# Patient Record
Sex: MALE | Race: WHITE | NOT HISPANIC OR LATINO | Employment: OTHER | URBAN - METROPOLITAN AREA
[De-identification: names, ages, dates, MRNs, and addresses within clinical notes are randomized per-mention and may not be internally consistent; named-entity substitution may affect disease eponyms.]

---

## 2017-01-05 RX ORDER — ALLOPURINOL 100 MG/1
100 TABLET ORAL DAILY
Qty: 30 TABLET | Refills: 5
Start: 2017-01-05 | End: 2017-10-11

## 2017-01-05 NOTE — TELEPHONE ENCOUNTER
Last office visit 08/03/2016. Last refill date 08/05/2016. Pt is requesting a refill on allopurinol 100mg #90 qd

## 2017-01-05 NOTE — TELEPHONE ENCOUNTER
I refilled this patient's zyloprim in error.  Please call the pharmacy and cancel it.  This is not my patient.

## 2017-01-31 ENCOUNTER — TELEPHONE (OUTPATIENT)
Dept: FAMILY MEDICINE | Facility: CLINIC | Age: 62
End: 2017-01-31

## 2017-02-01 RX ORDER — ALLOPURINOL 100 MG/1
100 TABLET ORAL DAILY
Qty: 30 TABLET | Refills: 5
Start: 2017-02-01

## 2017-02-02 ENCOUNTER — NURSE TRIAGE (OUTPATIENT)
Dept: ADMINISTRATIVE | Facility: CLINIC | Age: 62
End: 2017-02-02

## 2017-02-02 NOTE — TELEPHONE ENCOUNTER
Pt called saying his request for a refill was not denied. Upon review of chart, medication ordered (allopurinol) with refills on 1/5.  Recommended pt contact pharmacy to determine status and if needed to call back.    Reason for Disposition   Information only question and nurse able to answer    Protocols used: ST NO PROTOCOL CALL: INFORMATION ONLY-A-[place]

## 2017-02-02 NOTE — TELEPHONE ENCOUNTER
Pt calling back reporting that the pharmacy does not have the RX.  I spoke with the pharmacy and confirmed the order with them via telephone.    Upon researching further, it appears as though the RX was pended.    Attempted to call provider office with no success.    Provider staff -  patient directly regarding this medication.     Reason for Disposition   Caller requesting a NON-URGENT new prescription or refill and triager unable to refill per unit policy    Protocols used: ST MEDICATION QUESTION CALL-A-      Dr. Thompson / Dr. Garibay - Can either of you refill RX for allopurinol?  There appears to have been an issue with previous RX.

## 2017-02-03 ENCOUNTER — PATIENT MESSAGE (OUTPATIENT)
Dept: ORTHOPEDICS | Facility: CLINIC | Age: 62
End: 2017-02-03

## 2017-03-22 ENCOUNTER — TELEPHONE (OUTPATIENT)
Dept: UROLOGY | Facility: CLINIC | Age: 62
End: 2017-03-22

## 2017-03-22 NOTE — TELEPHONE ENCOUNTER
----- Message from Cristine Strong sent at 3/22/2017  4:35 PM CDT -----  Contact: self   Patient wants to speak with a nurse regarding scheduling appointment this week or early next week for a chase psa please call back [phone]

## 2017-03-22 NOTE — TELEPHONE ENCOUNTER
Spoke to pt and booked him an apt dali hopkins at 2:30pm to see dr matthews. PT verbalized understanding, he will bring a copy of his recent psa.

## 2017-03-24 ENCOUNTER — OFFICE VISIT (OUTPATIENT)
Dept: UROLOGY | Facility: CLINIC | Age: 62
End: 2017-03-24
Payer: COMMERCIAL

## 2017-03-24 VITALS
HEIGHT: 70 IN | WEIGHT: 206.13 LBS | BODY MASS INDEX: 29.51 KG/M2 | HEART RATE: 89 BPM | DIASTOLIC BLOOD PRESSURE: 76 MMHG | SYSTOLIC BLOOD PRESSURE: 129 MMHG

## 2017-03-24 DIAGNOSIS — R97.20 ELEVATED PSA: Primary | ICD-10-CM

## 2017-03-24 DIAGNOSIS — N20.0 KIDNEY STONE: ICD-10-CM

## 2017-03-24 DIAGNOSIS — N28.1 RENAL CYST: ICD-10-CM

## 2017-03-24 DIAGNOSIS — N40.1 BENIGN NON-NODULAR PROSTATIC HYPERPLASIA WITH LOWER URINARY TRACT SYMPTOMS: ICD-10-CM

## 2017-03-24 LAB
BILIRUB SERPL-MCNC: NORMAL MG/DL
BLOOD URINE, POC: NORMAL
COLOR, POC UA: YELLOW
GLUCOSE UR QL STRIP: NORMAL
KETONES UR QL STRIP: NORMAL
LEUKOCYTE ESTERASE URINE, POC: NORMAL
NITRITE, POC UA: NORMAL
PH, POC UA: 5
PROTEIN, POC: NORMAL
SPECIFIC GRAVITY, POC UA: 1.02
UROBILINOGEN, POC UA: NORMAL

## 2017-03-24 PROCEDURE — 99999 PR PBB SHADOW E&M-EST. PATIENT-LVL III: CPT | Mod: PBBFAC,,, | Performed by: UROLOGY

## 2017-03-24 PROCEDURE — 1160F RVW MEDS BY RX/DR IN RCRD: CPT | Mod: S$GLB,,, | Performed by: UROLOGY

## 2017-03-24 PROCEDURE — 99214 OFFICE O/P EST MOD 30 MIN: CPT | Mod: 25,S$GLB,, | Performed by: UROLOGY

## 2017-03-24 PROCEDURE — 81002 URINALYSIS NONAUTO W/O SCOPE: CPT | Mod: S$GLB,,, | Performed by: UROLOGY

## 2017-03-24 RX ORDER — CIPROFLOXACIN 500 MG/1
500 TABLET ORAL 2 TIMES DAILY
Qty: 6 TABLET | Refills: 0 | Status: SHIPPED | OUTPATIENT
Start: 2017-04-09 | End: 2017-04-12

## 2017-03-24 RX ORDER — METOPROLOL SUCCINATE 25 MG/1
25 TABLET, EXTENDED RELEASE ORAL 2 TIMES DAILY
COMMUNITY
End: 2017-10-11

## 2017-03-24 RX ORDER — LOSARTAN POTASSIUM 50 MG/1
50 TABLET ORAL DAILY
COMMUNITY
End: 2020-06-17

## 2017-03-24 NOTE — MR AVS SNAPSHOT
[place] [facility]  [address]  [facility] [address]  Phone: [phone]                  Merrill Cummings   3/24/2017 2:30 PM   Office Visit    Description:  Male : 1955   Provider:  iDlan Edmonds MD   Department:  [place] [facility]           Reason for Visit     Elevated PSA                To Do List           Goals (5 Years of Data)     None      [facility]     [facility] -  Assistance  Registered nurses in the [facility] provide clinical advisement, health education, appointment booking, and other advisory services.  Call for this free service at [phone].             Medications           Message regarding Medications     Verify the changes and/or additions to your medication regime listed below are the same as discussed with your clinician today.  If any of these changes or additions are incorrect, please notify your healthcare provider.        STOP taking these medications     indomethacin (INDOCIN SR) 75 mg CpSR CR capsule Take 1 capsule (75 mg total) by mouth 2 (two) times daily.    BYSTOLIC 10 mg Tab 5 mg 2 (two) times daily.            Verify that the below list of medications is an accurate representation of the medications you are currently taking.  If none reported, the list may be blank. If incorrect, please contact your healthcare provider. Carry this list with you in case of emergency.           Current Medications     aspirin 81 MG chewable tablet Take 81 mg by mouth once daily.      co-enzyme Q-10 30 mg capsule Take 30 mg by mouth 3 (three) times daily.    hydrochlorothiazide (MICROZIDE) 12.5 mg capsule Take 12.5 mg by mouth once daily.     losartan (COZAAR) 100 MG tablet Take 100 mg by mouth once daily.    metformin (GLUCOPHAGE) 500 MG tablet Take by mouth 3 (three) times daily after meals. Take 2 tablets in the morning and one tablet in the evening    metoprolol succinate (TOPROL-XL) 25 MG 24 hr tablet Take 25 mg by  "mouth 2 (two) times daily.    multivitamin with minerals tablet Take 1 tablet by mouth once daily.    ONETOUCH DELICA LANCETS 30 gauge Misc     simvastatin (ZOCOR) 40 MG tablet Take 1 tablet by mouth every other day.     allopurinol (ZYLOPRIM) 100 MG tablet Take 1 tablet (100 mg total) by mouth once daily.    levocetirizine (XYZAL) 5 MG tablet Take 1 tablet (5 mg total) by mouth every evening.           Clinical Reference Information           Your Vitals Were     BP Pulse Height Weight BMI    129/76 (BP Location: Left arm) 89 5' 10" (1.778 m) 93.5 kg (206 lb 2.1 oz) 29.58 kg/m2      Blood Pressure          Most Recent Value    BP  129/76      Allergies as of 3/24/2017     Nexium [Esomeprazole Magnesium]      Immunizations Administered on Date of Encounter - 3/24/2017     None      Language Assistance Services     ATTENTION: Language assistance services are available, free of charge. Please call [phone].      ATENCIÓN: Si habla jojo, tiene a turner disposición servicios gratuitos de asistencia lingüística. Llame al [phone].     ELLIOT Ý: N?u b?n nói Ti?ng Vi?t, có các d?ch v? h? tr? ngôn ng? mi?n phí dành cho b?n. G?i s? [phone].         [facility] complies with applicable Federal civil rights laws and does not discriminate on the basis of race, color, national origin, age, disability, or sex.        "

## 2017-03-24 NOTE — PROGRESS NOTES
UROLOGY [place]  3 14 17    c-c elevated psa    Age 62, comes in because his psa came back elevated at 7.6 in a [facility] test done 3 20 17.  At the same time, creat was 0.73, hct 38.2, electrolytes normal, and cholesterol normal.  In our lab records, psa was 3.3 two years ago    Pt voids well. Has nocturia x 0-1. Stream is fine, no burning or pains.   No frequency or urgency.    Pt has a hx of renal cyst and kidney stones       PAST MEDICAL HISTORY:  SURGICAL: Ankle fracture surgery, extracorporeal shock wave lithotripsy x2,   2002 and 2004.  Circumcision.     MEDICAL: Diabetes, arterial hypertension, hyperlipidemia, urolithiasis,   muscular dystrophy diagnosed in 2012 by the Neurology Service at [facility]   in the main [place]. The patient was leg braces to improve his walking.     FAMILIAL MEDICAL HISTORY: Father had prostate cancer, but did not die of that.   He also had lung cancer and did die of that.     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    Current Outpatient Prescriptions on File Prior to Visit   Medication Sig Dispense Refill    aspirin 81 MG chewable tablet Take 81 mg by mouth once daily.        co-enzyme Q-10 30 mg capsule Take 30 mg by mouth 3 (three) times daily.      hydrochlorothiazide (MICROZIDE) 12.5 mg capsule Take 12.5 mg by mouth once daily.       metformin (GLUCOPHAGE) 500 MG tablet Take by mouth 3 (three) times daily after meals. Take 2 tablets in the morning and one tablet in the evening      ONETOUCH DELICA LANCETS 30 gauge Misc       simvastatin (ZOCOR) 40 MG tablet Take 1 tablet by mouth every other day.       allopurinol (ZYLOPRIM) 100 MG tablet Take 1 tablet (100 mg total) by mouth once daily. 30 tablet 5    levocetirizine (XYZAL) 5 MG tablet Take 1 tablet (5 mg total) by mouth every evening. 10 tablet 0   ROS:  Denies malaise, headaches, eye symptoms, difficulty swallowing or breathing  problems.   No chest pains or palpitations.   No change in bowel habits, no tarry stools or hematochezia. No acid reflux.  No genital lesions.  No bleeding disorders, no seizures.       ALLERGIES: NEXIUM.      PHYSICAL EXAMINATION:  Pt alert, oriented, cooperative, no distress  HEENT: wnl.  Neck: supple, no JVD, no lymphadenopathy  Chest: CV NSR, no murmurs  Lungs: normal auscultation  ABDOMEN: Flat and nontender. No masses. No surgical scars. No hernias detected.  GENITOURINARY: Penis circumcised. Meatus normal. Testes normal.  JULY: Shows 40 g prostate without indurations or nodules.  Extremities: no edema, peripheral pulses nl  Neuro: preserved       IMPRESSION:   Elevated psa  bph on observation  Renal cyst  Kidney stones    I recommend prostate biopsy

## 2017-04-07 ENCOUNTER — ANESTHESIA EVENT (OUTPATIENT)
Dept: SURGERY | Facility: HOSPITAL | Age: 62
End: 2017-04-07
Payer: COMMERCIAL

## 2017-04-10 ENCOUNTER — HOSPITAL ENCOUNTER (OUTPATIENT)
Facility: HOSPITAL | Age: 62
Discharge: HOME | End: 2017-04-10
Attending: UROLOGY | Admitting: UROLOGY
Payer: COMMERCIAL

## 2017-04-10 ENCOUNTER — ANESTHESIA (OUTPATIENT)
Dept: SURGERY | Facility: HOSPITAL | Age: 62
End: 2017-04-10
Payer: COMMERCIAL

## 2017-04-10 ENCOUNTER — SURGERY (OUTPATIENT)
Age: 62
End: 2017-04-10

## 2017-04-10 DIAGNOSIS — R97.20 ELEVATED PSA: ICD-10-CM

## 2017-04-10 LAB — GLUCOSE SERPL-MCNC: 129 MG/DL (ref 70–110)

## 2017-04-10 PROCEDURE — D9220A PRA ANESTHESIA: Mod: CRNA,,, | Performed by: NURSE ANESTHETIST, CERTIFIED REGISTERED

## 2017-04-10 PROCEDURE — D9220A PRA ANESTHESIA: Mod: ANES,,, | Performed by: ANESTHESIOLOGY

## 2017-04-10 PROCEDURE — 71000033 HC RECOVERY, INTIAL HOUR: Mod: PO | Performed by: UROLOGY

## 2017-04-10 PROCEDURE — 25000003 PHARM REV CODE 250: Mod: PO | Performed by: NURSE ANESTHETIST, CERTIFIED REGISTERED

## 2017-04-10 PROCEDURE — 76942 ECHO GUIDE FOR BIOPSY: CPT | Mod: 26,59,, | Performed by: UROLOGY

## 2017-04-10 PROCEDURE — 76872 US TRANSRECTAL: CPT | Mod: 26,,, | Performed by: UROLOGY

## 2017-04-10 PROCEDURE — 63600175 PHARM REV CODE 636 W HCPCS: Mod: PO | Performed by: NURSE ANESTHETIST, CERTIFIED REGISTERED

## 2017-04-10 PROCEDURE — 37000008 HC ANESTHESIA 1ST 15 MINUTES: Mod: PO | Performed by: UROLOGY

## 2017-04-10 PROCEDURE — 25000003 PHARM REV CODE 250: Mod: PO | Performed by: UROLOGY

## 2017-04-10 PROCEDURE — 88342 IMHCHEM/IMCYTCHM 1ST ANTB: CPT | Mod: 26,,, | Performed by: PATHOLOGY

## 2017-04-10 PROCEDURE — 82962 GLUCOSE BLOOD TEST: CPT | Mod: PO | Performed by: UROLOGY

## 2017-04-10 PROCEDURE — 55700 PR BIOPSY OF PROSTATE,NEEDLE/PUNCH: CPT | Mod: ,,, | Performed by: UROLOGY

## 2017-04-10 PROCEDURE — 25000003 PHARM REV CODE 250: Mod: PO | Performed by: ANESTHESIOLOGY

## 2017-04-10 PROCEDURE — 88305 TISSUE EXAM BY PATHOLOGIST: CPT | Mod: 26,,, | Performed by: PATHOLOGY

## 2017-04-10 PROCEDURE — 88305 TISSUE EXAM BY PATHOLOGIST: CPT | Performed by: PATHOLOGY

## 2017-04-10 PROCEDURE — 36000704 HC OR TIME LEV I 1ST 15 MIN: Mod: PO | Performed by: UROLOGY

## 2017-04-10 PROCEDURE — 71000039 HC RECOVERY, EACH ADD'L HOUR: Mod: PO | Performed by: UROLOGY

## 2017-04-10 PROCEDURE — 36000705 HC OR TIME LEV I EA ADD 15 MIN: Mod: PO | Performed by: UROLOGY

## 2017-04-10 PROCEDURE — 37000009 HC ANESTHESIA EA ADD 15 MINS: Mod: PO | Performed by: UROLOGY

## 2017-04-10 RX ORDER — SODIUM CHLORIDE 0.9 % (FLUSH) 0.9 %
3 SYRINGE (ML) INJECTION
Status: DISCONTINUED | OUTPATIENT
Start: 2017-04-10 | End: 2017-04-10 | Stop reason: HOSPADM

## 2017-04-10 RX ORDER — PROPOFOL 10 MG/ML
VIAL (ML) INTRAVENOUS
Status: DISCONTINUED | OUTPATIENT
Start: 2017-04-10 | End: 2017-04-10

## 2017-04-10 RX ORDER — SODIUM CHLORIDE, SODIUM LACTATE, POTASSIUM CHLORIDE, CALCIUM CHLORIDE 600; 310; 30; 20 MG/100ML; MG/100ML; MG/100ML; MG/100ML
25 INJECTION, SOLUTION INTRAVENOUS CONTINUOUS
Status: DISCONTINUED | OUTPATIENT
Start: 2017-04-10 | End: 2017-04-10 | Stop reason: HOSPADM

## 2017-04-10 RX ORDER — LIDOCAINE HCL/PF 100 MG/5ML
SYRINGE (ML) INTRAVENOUS
Status: DISCONTINUED | OUTPATIENT
Start: 2017-04-10 | End: 2017-04-10

## 2017-04-10 RX ORDER — LIDOCAINE HYDROCHLORIDE 10 MG/ML
INJECTION INFILTRATION; PERINEURAL
Status: DISCONTINUED | OUTPATIENT
Start: 2017-04-10 | End: 2017-04-10 | Stop reason: HOSPADM

## 2017-04-10 RX ORDER — ONDANSETRON 2 MG/ML
4 INJECTION INTRAMUSCULAR; INTRAVENOUS DAILY PRN
Status: DISCONTINUED | OUTPATIENT
Start: 2017-04-10 | End: 2017-04-10 | Stop reason: HOSPADM

## 2017-04-10 RX ORDER — LIDOCAINE HYDROCHLORIDE 10 MG/ML
1 INJECTION, SOLUTION EPIDURAL; INFILTRATION; INTRACAUDAL; PERINEURAL ONCE
Status: DISCONTINUED | OUTPATIENT
Start: 2017-04-10 | End: 2017-04-10 | Stop reason: HOSPADM

## 2017-04-10 RX ORDER — HYDROCODONE BITARTRATE AND ACETAMINOPHEN 5; 325 MG/1; MG/1
1 TABLET ORAL EVERY 4 HOURS PRN
Status: DISCONTINUED | OUTPATIENT
Start: 2017-04-10 | End: 2017-04-10 | Stop reason: HOSPADM

## 2017-04-10 RX ORDER — FENTANYL CITRATE 50 UG/ML
INJECTION, SOLUTION INTRAMUSCULAR; INTRAVENOUS
Status: DISCONTINUED | OUTPATIENT
Start: 2017-04-10 | End: 2017-04-10

## 2017-04-10 RX ORDER — ACETAMINOPHEN 500 MG
500 TABLET ORAL EVERY 4 HOURS PRN
Status: DISCONTINUED | OUTPATIENT
Start: 2017-04-10 | End: 2017-04-10 | Stop reason: HOSPADM

## 2017-04-10 RX ORDER — ONDANSETRON 8 MG/1
8 TABLET, ORALLY DISINTEGRATING ORAL EVERY 8 HOURS PRN
Status: DISCONTINUED | OUTPATIENT
Start: 2017-04-10 | End: 2017-04-10 | Stop reason: HOSPADM

## 2017-04-10 RX ORDER — FENTANYL CITRATE 50 UG/ML
25 INJECTION, SOLUTION INTRAMUSCULAR; INTRAVENOUS EVERY 5 MIN PRN
Status: DISCONTINUED | OUTPATIENT
Start: 2017-04-10 | End: 2017-04-10 | Stop reason: HOSPADM

## 2017-04-10 RX ORDER — OXYCODONE AND ACETAMINOPHEN 5; 325 MG/1; MG/1
1 TABLET ORAL
Status: DISCONTINUED | OUTPATIENT
Start: 2017-04-10 | End: 2017-04-10 | Stop reason: HOSPADM

## 2017-04-10 RX ORDER — SODIUM CHLORIDE, SODIUM LACTATE, POTASSIUM CHLORIDE, CALCIUM CHLORIDE 600; 310; 30; 20 MG/100ML; MG/100ML; MG/100ML; MG/100ML
INJECTION, SOLUTION INTRAVENOUS CONTINUOUS
Status: DISCONTINUED | OUTPATIENT
Start: 2017-04-10 | End: 2017-04-10 | Stop reason: HOSPADM

## 2017-04-10 RX ORDER — MIDAZOLAM HYDROCHLORIDE 1 MG/ML
INJECTION, SOLUTION INTRAMUSCULAR; INTRAVENOUS
Status: DISCONTINUED | OUTPATIENT
Start: 2017-04-10 | End: 2017-04-10

## 2017-04-10 RX ORDER — PROPOFOL 10 MG/ML
VIAL (ML) INTRAVENOUS CONTINUOUS PRN
Status: DISCONTINUED | OUTPATIENT
Start: 2017-04-10 | End: 2017-04-10

## 2017-04-10 RX ADMIN — PROPOFOL 30 MG: 10 INJECTION, EMULSION INTRAVENOUS at 10:04

## 2017-04-10 RX ADMIN — MIDAZOLAM HYDROCHLORIDE 2 MG: 1 INJECTION, SOLUTION INTRAMUSCULAR; INTRAVENOUS at 10:04

## 2017-04-10 RX ADMIN — LIDOCAINE HYDROCHLORIDE 20 ML: 10 INJECTION, SOLUTION INFILTRATION; PERINEURAL at 11:04

## 2017-04-10 RX ADMIN — FENTANYL CITRATE 50 MCG: 50 INJECTION, SOLUTION INTRAMUSCULAR; INTRAVENOUS at 10:04

## 2017-04-10 RX ADMIN — LIDOCAINE HYDROCHLORIDE 100 MG: 20 INJECTION PARENTERAL at 10:04

## 2017-04-10 RX ADMIN — SODIUM CHLORIDE, SODIUM LACTATE, POTASSIUM CHLORIDE, AND CALCIUM CHLORIDE: .6; .31; .03; .02 INJECTION, SOLUTION INTRAVENOUS at 08:04

## 2017-04-10 RX ADMIN — PROPOFOL 50 MCG/KG/MIN: 10 INJECTION, EMULSION INTRAVENOUS at 10:04

## 2017-04-10 NOTE — IP AVS SNAPSHOT
[facility]  [address]  [place] [address]  Phone: [phone]           Patient Discharge Instructions   Our goal is to set you up for success. This packet includes information on your condition, medications, and your home care.  It will help you care for yourself to prevent having to return to the hospital.     Please ask your nurse if you have any questions.      There are many details to remember when preparing to leave the hospital. Here is what you will need to do:    1. Take your medicine. If you are prescribed medications, review your Medication List on the following pages. You may have new medications to  at the pharmacy and others that you'll need to stop taking. Review the instructions for how and when to take your medications. Talk with your doctor or nurses if you are unsure of what to do.     2. Go to your follow-up appointments. Specific follow-up information is listed in the following pages. Your may be contacted by a nurse or clinical provider about future appointments. Be sure we have all of the phone numbers to reach you. Please contact your provider's office if you are unable to make an appointment.     3. Watch for warning signs. Your doctor or nurse will give you detailed warning signs to watch for and when to call for assistance. These instructions may also include educational information about your condition. If you experience any of warning signs to your health, call your doctor.           [facility]  Unless otherwise directed by your provider, please   contact [facility], our nurse care line   that is available for 24/7 assistance.     [phone] (toll-free)     Registered nurses in the [facility]   provide: appointment scheduling, clinical advisement, health education, and other advisory services.                  ** Verify the list of medication(s) below is accurate and up to date. Carry this with you in case of  emergency. If your medications have changed, please notify your healthcare provider.             Medication List      ASK your doctor about these medications        Additional Info                      allopurinol 100 MG tablet   Commonly known as:  ZYLOPRIM   Quantity:  30 tablet   Refills:  5   Dose:  100 mg    Instructions:  Take 1 tablet (100 mg total) by mouth once daily.     Begin Date    AM    Noon    PM    Bedtime       aspirin 81 MG Chew   Refills:  0   Dose:  81 mg    Instructions:  Take 81 mg by mouth once daily.     Begin Date    AM    Noon    PM    Bedtime       ciprofloxacin HCl 500 MG tablet   Commonly known as:  CIPRO   Quantity:  6 tablet   Refills:  0   Dose:  500 mg    Instructions:  Take 1 tablet (500 mg total) by mouth 2 (two) times daily.     Begin Date    AM    Noon    PM    Bedtime       co-enzyme Q-10 30 mg capsule   Refills:  0   Dose:  30 mg    Instructions:  Take 30 mg by mouth 3 (three) times daily.     Begin Date    AM    Noon    PM    Bedtime       hydrochlorothiazide 12.5 mg capsule   Commonly known as:  MICROZIDE   Refills:  0   Dose:  12.5 mg    Instructions:  Take 12.5 mg by mouth once daily.     Begin Date    AM    Noon    PM    Bedtime       levocetirizine 5 MG tablet   Commonly known as:  XYZAL   Quantity:  10 tablet   Refills:  0   Dose:  5 mg    Instructions:  Take 1 tablet (5 mg total) by mouth every evening.     Begin Date    AM    Noon    PM    Bedtime       losartan 100 MG tablet   Commonly known as:  COZAAR   Refills:  0   Dose:  100 mg    Instructions:  Take 100 mg by mouth once daily.     Begin Date    AM    Noon    PM    Bedtime       metformin 500 MG tablet   Commonly known as:  GLUCOPHAGE   Refills:  0   Indications:  type 2 diabetes mellitus    Instructions:  Take by mouth 3 (three) times daily after meals. Take 2 tablets in the morning and one tablet in the evening     Begin Date    AM    Noon    PM    Bedtime       metoprolol succinate 25 MG 24 hr tablet    Commonly known as:  TOPROL-XL   Refills:  0   Dose:  25 mg   Indications:  take 1 by am and 2 pm, one prn    Instructions:  Take 25 mg by mouth 2 (two) times daily.     Begin Date    AM    Noon    PM    Bedtime       multivitamin with minerals tablet   Refills:  0   Dose:  1 tablet    Instructions:  Take 1 tablet by mouth once daily.     Begin Date    AM    Noon    PM    Bedtime       ONETOUCH DELICA LANCETS 30 gauge Misc   Refills:  0   Generic drug:  lancets      Begin Date    AM    Noon    PM    Bedtime       simvastatin 40 MG tablet   Commonly known as:  ZOCOR   Refills:  0   Dose:  1 tablet   Indications:  taking every other day    Instructions:  Take 1 tablet by mouth every other day.     Begin Date    AM    Noon    PM    Bedtime                  Please bring to all follow up appointments:    1. A copy of your discharge instructions.  2. All medicines you are currently taking in their original bottles.  3. Identification and insurance card.    Please arrive 15 minutes ahead of scheduled appointment time.    Please call 24 hours in advance if you must reschedule your appointment and/or time.            Discharge Instructions         PROSTATE BIOPSY      DOS:   Minimal activity for 24 hours.   May shower or bathe today.   Advance diet as tolerated.   Drink a lot of liquids until you see your doctor.   Expect blood in urine/stool for up to 3 weeks.   Expect blood in semen for up to 8 weeks.   Resume home medications as prescribed    DONT:   No driving for 24 hours or while taking narcotic pain medication   No aspirin, NSAIDS or blood thinners for 7 days   No sexual intercourse, heavy lifting, or strenuous activity for 7 days.   DO NOT TAKE ADDITIONAL TYLENOL/ACETAMINOPHEN WHILE TAKING NARCOTIC PAIN MEDICATION THAT CONTAINS TYLENOL/ACETAMINOPHEN.    CALL PHYSICIAN FOR:   Unable to urinate within 6 hours after surgery.   Excessive bleeding.    Fever>101   Persistent pain not relieved by pain  medication.    Contact your physician for emergencies at [phone]      Procedural Sedation (Adult)  You have been given medicine by vein to make you sleep during your surgery. This may have included both a pain medicine and sleeping medicine. Most of the effects have worn off. But you may still have some drowsiness for the next 6 to 8 hours.  Home care  Follow these guidelines when you get home:  · For the next 8 hours, you should be watched by a responsible adult. This person should make sure your condition is not getting worse.  · Don't take any medicine by mouth for pain or for sleep during the next 4 hours. These might react with the medicines you were given in the hospital. This could cause a much stronger response than usual.  · Don't drink any alcohol for the next 24 hours.  · Don't drive, operate dangerous machinery, or make important business or personal decisions during the next 24 hours.  Follow-up care  Follow up with your healthcare provider if you are not alert and back to your usual level of activity within 12 hours.  When to seek medical advice  Call your healthcare provider right away if any of these occur:  · Drowsiness gets worse  · Weakness or dizziness gets worse  · Repeated vomiting  · You cannot be awakened   Date Last Reviewed: 10/18/2016  © 9789-0100 [employer]. [address] [place] [address], [place] [address]. All rights reserved. This information is not intended as a substitute for professional medical care. Always follow your healthcare professional's instructions.            Admission Information     Date & Time Provider Department CSN    4/10/2017  7:47 AM Dilan Edmonds MD [facility] [id number]      Care Providers     Provider Role Specialty Primary office phone    Dilan Edmonds MD Attending Provider Urology [phone]    Dilan Edmonds MD Surgeon  Urology [phone]      Your Vitals Were     BP Pulse Temp Resp Height Weight    119/66  "(BP Location: Left arm, Patient Position: Lying, BP Method: Automatic) 78 98 °F (36.7 °C) (Skin) 16 5' 10" (1.778 m) 91.2 kg (201 lb)    SpO2 BMI             95% 28.84 kg/m2         Recent Lab Values     No lab values to display.      Allergies as of 4/10/2017        Reactions    Nexium [Esomeprazole Magnesium] Rash      Advance Directives     An advance directive is a document which, in the event you are no longer able to make decisions for yourself, tells your healthcare team what kind of treatment you do or do not want to receive, or who you would like to make those decisions for you.  If you do not currently have an advance directive, [facility] encourages you to create one.  For more information call:  [phone]), [phone] ([phone]),  or log on to [email or web address].        Language Assistance Services     ATTENTION: Language assistance services are available, free of charge. Please call [phone].      ATENCIÓN: Si habla español, tiene a turner disposición servicios gratuitos de asistencia lingüística. Llame al [phone].     CHÚ Ý: N?u b?n nói Ti?ng Vi?t, có các d?ch v? h? tr? ngôn ng? mi?n phí dành cho b?n. G?i s? [phone].         [facility] complies with applicable Federal civil rights laws and does not discriminate on the basis of race, color, national origin, age, disability, or sex.        "

## 2017-04-10 NOTE — H&P
c-c elevated psa     Age 62, psa came back elevated at 7.6 in a [facility] test done 3 20 17.  At the same time, creat was 0.73, hct 38.2, electrolytes normal, and cholesterol normal.  In our lab records, psa was 3.3 two years ago     Pt voids well. Has nocturia x 0-1. Stream is fine, no burning or pains.   No frequency or urgency.     Pt has a hx of renal cyst and kidney stones         PAST MEDICAL HISTORY:  SURGICAL: Ankle fracture surgery, extracorporeal shock wave lithotripsy x2,   2002 and 2004.  Circumcision.      MEDICAL: Diabetes, arterial hypertension, hyperlipidemia, urolithiasis,   muscular dystrophy diagnosed in 2012 by the Neurology Service at [facility]   in the [facility]. The patient was leg braces to improve his walking.      FAMILIAL MEDICAL HISTORY: Father had prostate cancer, but did not die of that.   He also had lung cancer and did die of that.      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            Current Outpatient Prescriptions on File Prior to Visit   Medication Sig Dispense Refill    aspirin 81 MG chewable tablet Take 81 mg by mouth once daily.         co-enzyme Q-10 30 mg capsule Take 30 mg by mouth 3 (three) times daily.        hydrochlorothiazide (MICROZIDE) 12.5 mg capsule Take 12.5 mg by mouth once daily.         metformin (GLUCOPHAGE) 500 MG tablet Take by mouth 3 (three) times daily after meals. Take 2 tablets in the morning and one tablet in the evening        ONETOUCH DELICA LANCETS 30 gauge Misc          simvastatin (ZOCOR) 40 MG tablet Take 1 tablet by mouth every other day.         allopurinol (ZYLOPRIM) 100 MG tablet Take 1 tablet (100 mg total) by mouth once daily. 30 tablet 5    levocetirizine (XYZAL) 5 MG tablet Take 1 tablet (5 mg total) by mouth every evening. 10 tablet 0   ROS:  Denies malaise, headaches, eye symptoms, difficulty swallowing or breathing problems.   No chest  pains or palpitations.   No change in bowel habits, no tarry stools or hematochezia. No acid reflux.  No genital lesions.  No bleeding disorders, no seizures.         ALLERGIES: NEXIUM.        PHYSICAL EXAMINATION:  Pt alert, oriented, cooperative, no distress  HEENT: wnl.  Neck: supple, no JVD, no lymphadenopathy  Chest: CV NSR, no murmurs  Lungs: normal auscultation  ABDOMEN: Flat and nontender. No masses. No surgical scars. No hernias detected.  GENITOURINARY: Penis circumcised. Meatus normal. Testes normal.  JULY: Shows 40 g prostate without indurations or nodules.  Extremities: no edema, peripheral pulses nl  Neuro: preserved         IMPRESSION:   Elevated psa  bph on observation  Renal cyst  Kidney stones     I recommend prostate biopsy

## 2017-04-10 NOTE — ANESTHESIA POSTPROCEDURE EVALUATION
"Anesthesia Post Evaluation    Patient: Merrill Cummings    Procedure(s) Performed: Procedure(s) (LRB):  TRANSRECTAL ULTRASOUND GUIDED PROSTATE BIOPSY (Bilateral)    Final Anesthesia Type: MAC  Patient location during evaluation: PACU  Patient participation: Yes- Able to Participate  Level of consciousness: awake and alert  Post-procedure vital signs: reviewed and stable  Pain management: adequate  Airway patency: patent  PONV status at discharge: No PONV  Anesthetic complications: no      Cardiovascular status: blood pressure returned to baseline and hemodynamically stable  Respiratory status: unassisted, spontaneous ventilation and room air  Hydration status: euvolemic  Follow-up not needed.        Visit Vitals    /72 (BP Location: Left arm, Patient Position: Lying, BP Method: Automatic)    Pulse 78    Temp 36.7 °C (98 °F) (Skin)    Resp 20    Ht 5' 10" (1.778 m)    Wt 91.2 kg (201 lb)    SpO2 98%    BMI 28.84 kg/m2       Pain/Cali Score: Pain Assessment Performed: Yes (4/10/2017 12:30 PM)  Presence of Pain: denies (4/10/2017 12:30 PM)  Cali Score: 10 (4/10/2017 12:30 PM)      "

## 2017-04-10 NOTE — OP NOTE
Site: [facility] [place]  Date of procedure: 4 10 17  Surgeon: Kendal  Assistant: none  Procedure: TRANSRECTAL ULTRASOUND OF PROSTATE WITH NEEDLE BIOPSIES, ULTRASOUND GUIDANCE FOR BIOPSIES  Preop dx: high psa  Postop dx: same  Complications: none  Ebl: none  Drains: none    Patient took a prophylactic antibiotic starting last night. He also gave himself a Fleet enema 2 hours prior to the procedure.     Pt was brought to the operating room and given satisfactory anesthesia. He was then placed in the L lateral decubitus position. We instilled a 10-ml enema of pure Betadine solution in the rectal ampulla and waited a few minutes.   The rectal probe of the [employer] 7-MHz [employer] Ultrasound machine was inserted and multiple transverse and longitudinal scans were done.     Prostate measurements:    Prostate volume was 31.3 cm3  Transverse dimension 47.6  mm  Height 26.5 mm  Length 47.4  mm.      The general shape of the prostate was symmetric. Several small calcific densities were seen, as well as small cystic lesions. No definite hypoechoic areas were seen.     We performed a prostate block (pudendal block) with a total of 10 ml of xylocaine applied at the prostate base and prostate apex on both sides. Multiple biopsies were obtained using the ugichem Magnum Biopsy Needle under ultrasound guidance. We used the sextant topographic technique to distribute and send the biopsy samples. The patient tolerated the procedure well. He was then transferred to recovery room in satisfactory condition.    Patient was warned about possible complications, such as persistent hematuria, hematochezia, hematospermia, infection and sepsis. He is to continue with the antibiotic given until finished. Drink abundant fluids. Call in one week for pathology results

## 2017-04-10 NOTE — DISCHARGE INSTRUCTIONS
PROSTATE BIOPSY      DOS:   Minimal activity for 24 hours.   May shower or bathe today.   Advance diet as tolerated.   Drink a lot of liquids until you see your doctor.   Expect blood in urine/stool for up to 3 weeks.   Expect blood in semen for up to 8 weeks.   Resume home medications as prescribed    DONT:   No driving for 24 hours or while taking narcotic pain medication   No aspirin, NSAIDS or blood thinners for 7 days   No sexual intercourse, heavy lifting, or strenuous activity for 7 days.   DO NOT TAKE ADDITIONAL TYLENOL/ACETAMINOPHEN WHILE TAKING NARCOTIC PAIN MEDICATION THAT CONTAINS TYLENOL/ACETAMINOPHEN.    CALL PHYSICIAN FOR:   Unable to urinate within 6 hours after surgery.   Excessive bleeding.    Fever>101   Persistent pain not relieved by pain medication.    Contact your physician for emergencies at [phone]      Procedural Sedation (Adult)  You have been given medicine by vein to make you sleep during your surgery. This may have included both a pain medicine and sleeping medicine. Most of the effects have worn off. But you may still have some drowsiness for the next 6 to 8 hours.  Home care  Follow these guidelines when you get home:  · For the next 8 hours, you should be watched by a responsible adult. This person should make sure your condition is not getting worse.  · Don't take any medicine by mouth for pain or for sleep during the next 4 hours. These might react with the medicines you were given in the hospital. This could cause a much stronger response than usual.  · Don't drink any alcohol for the next 24 hours.  · Don't drive, operate dangerous machinery, or make important business or personal decisions during the next 24 hours.  Follow-up care  Follow up with your healthcare provider if you are not alert and back to your usual level of activity within 12 hours.  When to seek medical advice  Call your healthcare provider right away if any of these occur:  · Drowsiness  gets worse  · Weakness or dizziness gets worse  · Repeated vomiting  · You cannot be awakened   Date Last Reviewed: 10/18/2016  © 6875-5982 [employer]. [address] [place] [address], [place] [address]. All rights reserved. This information is not intended as a substitute for professional medical care. Always follow your healthcare professional's instructions.

## 2017-04-10 NOTE — ANESTHESIA PREPROCEDURE EVALUATION
04/10/2017  Merrill Cummings is a 62 y.o., male.    [facility] Anesthesia Evaluation    I have reviewed the Patient Summary Reports.    I have reviewed the Nursing Notes.   I have reviewed the Medications.     Review of Systems  Anesthesia Hx:  No problems with previous Anesthesia  Denies Family Hx of Anesthesia complications.   Denies Personal Hx of Anesthesia complications.   Social:  Non-Smoker, No Alcohol Use    Cardiovascular:   Hypertension  Denies Angina. hyperlipidemia    Pulmonary:   Denies Shortness of breath.    Renal/:   renal calculi    Musculoskeletal:   Muscular dystrophy, distal LE weakness    Neurological:   [place]  Gait disturbance   Endocrine:   Diabetes        Physical Exam  General:  Well nourished    Airway/Jaw/Neck:  Airway Findings: Mouth Opening: Normal Tongue: Normal  Mallampati: II  TM Distance: Normal, at least 6 cm       Chest/Lungs:  Chest/Lungs Findings: Clear to auscultation, Normal Respiratory Rate     Heart/Vascular:  Heart Findings: Rate: Normal  Rhythm: Regular Rhythm        Mental Status:  Mental Status Findings:  Cooperative, Alert and Oriented         Anesthesia Plan  Type of Anesthesia, risks & benefits discussed:  Anesthesia Type:  MAC  Patient's Preference:   Intra-op Monitoring Plan:   Intra-op Monitoring Plan Comments:   Post Op Pain Control Plan:   Post Op Pain Control Plan Comments:   Induction:   IV  Beta Blocker:  Patient is on a Beta-Blocker and has received one dose within the past 24 hours (No further documentation required).       Informed Consent: Patient understands risks and agrees with Anesthesia plan.  Questions answered. Anesthesia consent signed with patient.  ASA Score: 3     Day of Surgery Review of History & Physical: I have interviewed and examined the patient. I have reviewed the patient's H&P dated:  There are no significant changes.          Ready For  Surgery From Anesthesia Perspective.

## 2017-04-10 NOTE — PLAN OF CARE
Patient tolerating oral liquids without difficulty. No apparent s&s of distress noted at this time, no complaints voiced at this time. Pt awaiting to void prior to d/c. Discharge instructions reviewed with patient/family/friend with good verbal feedback received.

## 2017-04-10 NOTE — TRANSFER OF CARE
"Anesthesia Transfer of Care Note    Patient: Merrill Cummings    Procedure(s) Performed: Procedure(s) (LRB):  TRANSRECTAL ULTRASOUND GUIDED PROSTATE BIOPSY (Bilateral)    Patient location: PACU    Anesthesia Type: general    Transport from OR: Transported from OR on room air with adequate spontaneous ventilation    Post pain: adequate analgesia    Post assessment: no apparent anesthetic complications    Post vital signs: stable    Level of consciousness: awake    Nausea/Vomiting: no nausea/vomiting    Complications: none          Last vitals:   Visit Vitals    /66 (BP Location: Left arm, Patient Position: Lying, BP Method: Automatic)    Pulse 78    Temp 36.7 °C (98 °F) (Skin)    Resp 16    Ht 5' 10" (1.778 m)    Wt 91.2 kg (201 lb)    SpO2 95%    BMI 28.84 kg/m2     "

## 2017-04-11 ENCOUNTER — TELEPHONE (OUTPATIENT)
Dept: UROLOGY | Facility: CLINIC | Age: 62
End: 2017-04-11

## 2017-04-11 VITALS
SYSTOLIC BLOOD PRESSURE: 120 MMHG | HEART RATE: 78 BPM | RESPIRATION RATE: 20 BRPM | HEIGHT: 70 IN | OXYGEN SATURATION: 98 % | WEIGHT: 201 LBS | BODY MASS INDEX: 28.77 KG/M2 | DIASTOLIC BLOOD PRESSURE: 72 MMHG | TEMPERATURE: 98 F

## 2017-04-21 ENCOUNTER — TELEPHONE (OUTPATIENT)
Dept: UROLOGY | Facility: CLINIC | Age: 62
End: 2017-04-21

## 2017-04-21 NOTE — TELEPHONE ENCOUNTER
Pathology was contacted regarding the prostate specimen. They are running a little behind on their samples in the lab. Patient is aware and will be contacted asap.

## 2017-04-25 ENCOUNTER — TELEPHONE (OUTPATIENT)
Dept: UROLOGY | Facility: CLINIC | Age: 62
End: 2017-04-25

## 2017-04-25 DIAGNOSIS — R97.20 ELEVATED PSA: Primary | ICD-10-CM

## 2017-04-25 NOTE — TELEPHONE ENCOUNTER
----- Message from Cristine Strong sent at 4/25/2017  8:58 AM CDT -----  Contact: self   Patient wants to speak with a nurse regarding test results please call back at [phone]

## 2017-04-25 NOTE — TELEPHONE ENCOUNTER
Slides were sent our for special staining, received back today.  Pathologist has not read them yet.  Will monitor for results and contact patient as soon as available.

## 2017-10-04 ENCOUNTER — LAB VISIT (OUTPATIENT)
Dept: LAB | Facility: HOSPITAL | Age: 62
End: 2017-10-04
Attending: UROLOGY
Payer: COMMERCIAL

## 2017-10-04 DIAGNOSIS — R97.20 ELEVATED PSA: ICD-10-CM

## 2017-10-04 LAB — COMPLEXED PSA SERPL-MCNC: 9.1 NG/ML

## 2017-10-04 PROCEDURE — 84153 ASSAY OF PSA TOTAL: CPT

## 2017-10-04 PROCEDURE — 36415 COLL VENOUS BLD VENIPUNCTURE: CPT | Mod: PO

## 2017-10-05 ENCOUNTER — TELEPHONE (OUTPATIENT)
Dept: GENETICS | Facility: CLINIC | Age: 62
End: 2017-10-05

## 2017-10-05 NOTE — TELEPHONE ENCOUNTER
Spoke to Mr. Cummings to review the results of the exome reanalysis. I am mailing a copy of the report to the home. A definitive diagnosis was not made. 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 This gene is not currently associated with human disease and the inheritance is not known. Dr Harp wants to test family members. The patient's mother passed away 2 years ago. The brother (who has similar issues) is estranged from the family. The three sisters have scoliosis but no abnormal movements. I explained that testing the sisters may not be particularly helpful at this time. Mr. Cummings has not seen neurology in some time but he plans to make a follow up appointment. He has been doing research online and has learned about CMT. The patient wonders if he may have CMT.

## 2017-10-11 ENCOUNTER — OFFICE VISIT (OUTPATIENT)
Dept: UROLOGY | Facility: CLINIC | Age: 62
End: 2017-10-11
Payer: COMMERCIAL

## 2017-10-11 VITALS
BODY MASS INDEX: 30.52 KG/M2 | HEART RATE: 75 BPM | WEIGHT: 213.19 LBS | HEIGHT: 70 IN | DIASTOLIC BLOOD PRESSURE: 86 MMHG | SYSTOLIC BLOOD PRESSURE: 146 MMHG

## 2017-10-11 DIAGNOSIS — N32.81 OAB (OVERACTIVE BLADDER): ICD-10-CM

## 2017-10-11 DIAGNOSIS — R97.20 ELEVATED PSA: Primary | ICD-10-CM

## 2017-10-11 LAB
BILIRUB SERPL-MCNC: NORMAL MG/DL
BLOOD URINE, POC: NORMAL
COLOR, POC UA: YELLOW
GLUCOSE UR QL STRIP: NORMAL
KETONES UR QL STRIP: NORMAL
LEUKOCYTE ESTERASE URINE, POC: NORMAL
NITRITE, POC UA: NORMAL
PH, POC UA: 7
PROTEIN, POC: NORMAL
SPECIFIC GRAVITY, POC UA: 1.01
UROBILINOGEN, POC UA: NORMAL

## 2017-10-11 PROCEDURE — 99214 OFFICE O/P EST MOD 30 MIN: CPT | Mod: 25,S$GLB,, | Performed by: UROLOGY

## 2017-10-11 PROCEDURE — 81002 URINALYSIS NONAUTO W/O SCOPE: CPT | Mod: S$GLB,,, | Performed by: UROLOGY

## 2017-10-11 PROCEDURE — 99999 PR PBB SHADOW E&M-EST. PATIENT-LVL III: CPT | Mod: PBBFAC,,, | Performed by: UROLOGY

## 2017-10-11 RX ORDER — OXYBUTYNIN CHLORIDE 10 MG/1
10 TABLET, EXTENDED RELEASE ORAL DAILY
Qty: 30 TABLET | Refills: 11 | Status: SHIPPED | OUTPATIENT
Start: 2017-10-11 | End: 2018-04-03

## 2017-10-11 RX ORDER — METFORMIN HYDROCHLORIDE 1000 MG/1
1000 TABLET ORAL 2 TIMES DAILY WITH MEALS
Refills: 1 | COMMUNITY
Start: 2017-08-14 | End: 2022-10-16

## 2017-10-11 RX ORDER — METOPROLOL SUCCINATE 50 MG/1
50 TABLET, EXTENDED RELEASE ORAL 2 TIMES DAILY
Refills: 1 | COMMUNITY
Start: 2017-09-17 | End: 2019-05-31

## 2017-10-11 RX ORDER — HYDROCHLOROTHIAZIDE 25 MG/1
25 TABLET ORAL DAILY
Refills: 1 | COMMUNITY
Start: 2017-07-24 | End: 2023-06-05 | Stop reason: ALTCHOICE

## 2017-10-11 NOTE — PROGRESS NOTES
UROLOGY [place]  10 11 17    Urinalysis: col yellow, sg 10, pH 7, leuco +, nitrites -, prot -, glucose -, bili -, blood -    c-c elevated psa, urinary frequency      Age 62, worried that his psa is going up. Had a negative prostate biopsy six months ago, but the psa has risen to 9.1 from 8.1 in January.     Pt voids with frequency. However, has nocturia only x 0-1. Stream is fine, no burning or pains.   Has some urgency     Pt has a hx of renal cyst and kidney stones        PAST MEDICAL HISTORY:    SURGICAL: Ankle fracture surgery, extracorporeal shock wave lithotripsy x2,   2002 and 2004. Circumcision.     MEDICAL: Diabetes, arterial hypertension, hyperlipidemia, urolithiasis,   muscular dystrophy diagnosed in 2012 by the Neurology Service at [facility]   in the [facility]. The patient was leg braces to improve his walking.     FAMILIAL MEDICAL HISTORY: Father had prostate cancer, but did not die of that.   He also had lung cancer and did die of that.     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            Current Outpatient Prescriptions on File Prior to Visit   Medication Sig Dispense Refill    aspirin 81 MG chewable tablet Take 81 mg by mout        co-enzyme Q-10 30 mg capsule Take 30 mg by m        hydrochlorothiazide (MICROZIDE) 12.5 mg capsule Take 1        metformin (GLUCOPHAGE) 500 MG tablet Take by mouth 3        ONETOUCH DELICA LANC          simvastatin (ZOCOR) 40 MG tablet Take 1 table mo        allopurinol (ZYLOPRIM Take 1  30 tablet 5    levocetirizine (XYZAL) 5 MG tablet Take 1 tablet (5 10 tablet 0   ROS:  Denies malaise, headaches, eye symptoms, difficulty swallowing or breathing problems.   No chest pains or palpitations.   No change in bowel habits, no tarry stools or hematochezia. No acid reflux.  No genital lesions.  No bleeding disorders, no seizures.  Psych: normal mentation, normal  affect        ALLERGIES: NEXIUM.        PHYSICAL EXAMINATION:  Pt alert, oriented, cooperative, no distress  HEENT: wnl.  Neck: supple, no JVD, no lymphadenopathy  Chest: CV NSR  Lungs: normal auscultation  ABDOMEN: Flat and nontender. No masses. No surgical scars. No hernias detected.  GENITOURINARY: Penis circumcised. Meatus normal. Testes normal.  JULY: Shows 40 g prostate without indurations or nodules.  Extremities: no edema, peripheral pulses nl  Neuro: preserved        IMPRESSION:   Elevated psa  bph on observation  Renal cyst  Kidney stones  Family hx of prost cancer  Overactive bladder    I am sending ditropan 10 xl to his pharmacy in [place]     I recommend doing a MDX test on the negative tissue from his last prostate biopsy, as it will help us decide if we need to repeat the test.

## 2017-10-18 DIAGNOSIS — N40.0 BENIGN PROSTATIC HYPERPLASIA, UNSPECIFIED WHETHER LOWER URINARY TRACT SYMPTOMS PRESENT: ICD-10-CM

## 2017-10-18 DIAGNOSIS — R97.20 ELEVATED PSA: Primary | ICD-10-CM

## 2017-10-31 ENCOUNTER — TELEPHONE (OUTPATIENT)
Dept: UROLOGY | Facility: CLINIC | Age: 62
End: 2017-10-31

## 2017-10-31 NOTE — TELEPHONE ENCOUNTER
----- Message from Joan Blake sent at 10/31/2017  8:32 AM CDT -----  Contact: Addis with MDX  Addis is calling for patients last date of PSA & value.  Call Back#[phone] Option 1  Thanks

## 2017-11-13 ENCOUNTER — TELEPHONE (OUTPATIENT)
Dept: UROLOGY | Facility: CLINIC | Age: 62
End: 2017-11-13

## 2017-11-13 NOTE — TELEPHONE ENCOUNTER
----- Message from Ibis Gale sent at 11/13/2017  1:32 PM CST -----  Contact: Patient  Merrill, patient [phone] calling to speak with a nurse in office regarding test results. Please advise. Thanks.

## 2017-11-15 ENCOUNTER — TELEPHONE (OUTPATIENT)
Dept: UROLOGY | Facility: CLINIC | Age: 62
End: 2017-11-15

## 2017-11-15 NOTE — TELEPHONE ENCOUNTER
Reviewed results of [facility] DNA Methylation test with patient.  Scheduled an office visit in April to discuss further and set up a Prostate biopsy.

## 2018-01-14 ENCOUNTER — PATIENT MESSAGE (OUTPATIENT)
Dept: UROLOGY | Facility: CLINIC | Age: 63
End: 2018-01-14

## 2018-01-23 ENCOUNTER — OFFICE VISIT (OUTPATIENT)
Dept: GASTROENTEROLOGY | Facility: CLINIC | Age: 63
End: 2018-01-23
Payer: COMMERCIAL

## 2018-01-23 VITALS
WEIGHT: 207.88 LBS | BODY MASS INDEX: 29.76 KG/M2 | HEIGHT: 70 IN | DIASTOLIC BLOOD PRESSURE: 92 MMHG | SYSTOLIC BLOOD PRESSURE: 151 MMHG | HEART RATE: 91 BPM

## 2018-01-23 DIAGNOSIS — K21.9 GASTROESOPHAGEAL REFLUX DISEASE, ESOPHAGITIS PRESENCE NOT SPECIFIED: Primary | ICD-10-CM

## 2018-01-23 PROCEDURE — 99244 OFF/OP CNSLTJ NEW/EST MOD 40: CPT | Mod: S$GLB,,, | Performed by: INTERNAL MEDICINE

## 2018-01-23 PROCEDURE — 99999 PR PBB SHADOW E&M-EST. PATIENT-LVL III: CPT | Mod: PBBFAC,,, | Performed by: INTERNAL MEDICINE

## 2018-01-23 RX ORDER — AMLODIPINE BESYLATE 5 MG/1
5 TABLET ORAL DAILY
Refills: 1 | COMMUNITY
Start: 2017-12-23 | End: 2018-10-04

## 2018-01-23 RX ORDER — FAMOTIDINE 20 MG/1
20 TABLET, FILM COATED ORAL 2 TIMES DAILY
COMMUNITY
End: 2018-04-03

## 2018-01-23 RX ORDER — NAPROXEN SODIUM 220 MG/1
81 TABLET, FILM COATED ORAL DAILY
COMMUNITY
End: 2018-04-03

## 2018-01-23 NOTE — PROGRESS NOTES
"Subjective:    PCP: Dustin Thompson MD    Referring physician: Dr. Dustin Thompson      Patient ID: Merrill Cummings is a 63 y.o. male.    Chief Complaint: Gastroesophageal Reflux and Abdominal Pain (c/o dull pain in upper abd)      HPI   Merrill Cummings is a 63 y.o. /White male here today for    GERD  Paitent complains of heartburn. This has been associated with belching, dysphagia, heartburn and odynophagia. This is associated with mild discomfort in epigastric area. He denies unexpected weight loss. Symptoms have been present for 2 months. He has had dysphagia for solids. He has not lost weight. He denies melena, hematochezia, hematemesis, and coffee ground emesis. Medical therapy in the past has included H2 antagonists.   These symptoms started after he had a URI and cough. These symptoms settled down but heartburn has persisted.     He has had GERD for several years but has infrequent symptoms especially with pizza or if he eats late. He has been burping a lot adn food seems to backing up. Recently he had 3 episodes of mid back pain with jaw pain with hot and cold liquids. He has seen his cardiologist since and he believes the symptoms are unrelated to cardiac pathology.    Tried Pepcid OTC and it helped. Had been taking Tums previously.     No NSAIDs or anticoagulants.    Past Medical History:   Diagnosis Date    Anticoagulant long-term use     Diabetes mellitus 3-4yrs    Borderline    HEARING LOSS     Hyperlipidemia     Hypertension     Kidney stone     Muscular dystrophy 2012    diagn[facility] neurolog[facility] -wears leg braces    Urolithiasis        Past Surgical History:   Procedure Laterality Date    ANKLE FRACTURE SURGERY      CIRCUMCISION      COLONOSCOPY  ~2004 or 05  ([place]?  [place]    "Nothing"    EXTRACORPOREAL SHOCK WAVE LITHOTRIPSY  2002, 2004    x 2 - dr chivo joshi ([facility])    MULTIPLE TOOTH EXTRACTIONS      ORIF ankle fracture  1988       Family History   Problem " Relation Age of Onset    Diabetes Father     Hypertension Father     Muscular dystrophy Father     Cancer Father      prostate, did not die of/lung,  of    Hypertension Mother     Scoliosis Sister     Pulmonary embolism Paternal Grandfather     Prostate cancer Paternal Grandfather        Social History     Social History    Marital status:      Spouse name: N/A    Number of children: N/A    Years of education: N/A     Occupational History    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  1    aspirin 81 MG Chew Take 81 mg by mouth once daily.      blood sugar diagnostic (BLOOD GLUCOSE TEST) Strp by Misc.(Non-Drug; Combo Route) route. One touch Ultra Blue      co-enzyme Q-10 30 mg capsule Take 100 mg by mouth once daily. Qunol 100 mg      famotidine (PEPCID) 20 MG tablet Take 20 mg by mouth 2 (two) times daily.      hydrochlorothiazide (HYDRODIURIL) 25 MG tablet Take 25 mg by mouth once daily.  1    losartan (COZAAR) 100 MG tablet Take 100 mg by mouth once daily.      metformin (GLUCOPHAGE) 1000 MG tablet Take 1,000 mg by mouth 2 (two) times daily.  1    metoprolol succinate (TOPROL-XL) 50 MG 24 hr tablet Take 50 mg by mouth 2 (two) times daily.  1    multivitamin with minerals tablet Take 1 tablet by mouth once daily.      ONETOUCH DELICA LANCETS 30 gauge Misc       oxybutynin (DITROPAN-XL) 10 MG 24 hr tablet Take 1 tablet (10 mg total) by mouth once daily. 30 tablet 11    simvastatin  "(ZOCOR) 40 MG tablet Take 1 tablet by mouth every other day.        No current facility-administered medications for this visit.        Review of Systems   Constitutional: Negative for activity change, appetite change, chills, diaphoresis, fatigue and fever.   HENT: Negative for congestion, ear pain, facial swelling, mouth sores, nosebleeds, rhinorrhea, sore throat and voice change.    Eyes: Negative for photophobia, pain, discharge, redness and visual disturbance.   Respiratory: Negative for apnea, cough, choking, chest tightness, shortness of breath and wheezing.    Cardiovascular: Negative for chest pain, palpitations and leg swelling.   Gastrointestinal:        As per HPI   Genitourinary: Negative for decreased urine volume, difficulty urinating, dysuria, frequency and hematuria.   Musculoskeletal: Negative for arthralgias, back pain, myalgias, neck pain and neck stiffness.   Skin: Negative for pallor and rash.   Neurological: Negative for tremors, seizures, syncope, weakness and headaches.   Hematological: Negative for adenopathy. Does not bruise/bleed easily.   Psychiatric/Behavioral: Negative for behavioral problems, confusion, hallucinations and sleep disturbance. The patient is not nervous/anxious.        Objective:   BP (!) 151/92   Pulse 91   Ht 5' 10" (1.778 m)   Wt 94.3 kg (207 lb 14.3 oz)   BMI 29.83 kg/m²   Wt Readings from Last 1 Encounters:   01/23/18 1256 94.3 kg (207 lb 14.3 oz)       Physical Exam   Constitutional: He is oriented to person, place, and time. He appears well-developed and well-nourished. No distress.   HENT:   Head: Normocephalic and atraumatic.   Mouth/Throat: Oropharynx is clear and moist.   Eyes: Conjunctivae and EOM are normal. Pupils are equal, round, and reactive to light. No scleral icterus.   Neck: Normal range of motion. Neck supple.   Cardiovascular: Normal rate, regular rhythm and normal heart sounds.    Pulmonary/Chest: Effort normal.   Abdominal: Soft. Bowel sounds " are normal. He exhibits no distension and no mass. There is no tenderness. There is no rebound and no guarding. No hernia.   Musculoskeletal: Normal range of motion. He exhibits no edema.   Lymphadenopathy:     He has no cervical adenopathy.   Neurological: He is alert and oriented to person, place, and time. He has normal reflexes.   Skin: Skin is warm and dry. No rash noted. He is not diaphoretic. No erythema. No pallor.   Psychiatric: He has a normal mood and affect. His behavior is normal. Judgment and thought content normal.       No results found for: WBC, HGB, HCT, MCV, PLT    Chemistry    No results found for: NA, K, CL, CO2, BUN, CREATININE, GLU No results found for: CALCIUM, ALKPHOS, AST, ALT, BILITOT, ESTGFRAFRICA, EGFRNONAA       No results found for: APTT  No results found for: INR, PROTIME    No components found for: [id number]    Lab Results   Component Value Date    TSH 1.850 07/11/2012     No results found for: HGBA1C    No results found for: AMYLASE  No results found for: LIPASE    No results found for: HAV, HEPAIGM, HEPBIGM, HEPBCAB, HBEAG, HEPCAB  No results found for: PPD    No results found for: OCCULTBLOOD    No results found for: IRON, TIBC, FERRITIN, SATURATEDIRO    Assessment:      1. Gastroesophageal reflux disease, esophagitis presence not specified         Plan:     Gastroesophageal reflux disease, esophagitis presence not specified  -     Case request GI: ESOPHAGOGASTRODUODENOSCOPY (EGD)    Will prescribe PPI after EGD  Discussed GERD lifestyle changes such as avoiding trigger foods like coffee, citrus, tomato pastes, alcohol, mint, chocolate, keeping the head of bed elevated when lying down, avoid meals 3-4 hrs before bedtime and weight loss        Follow-up if symptoms worsen or fail to improve.      Sonya Wolf MD

## 2018-01-23 NOTE — LETTER
January 24, 2018      Dustin Thompson MD  [address]  [address]  [facility] [facility] [address]           [facility]  [address] [address] [address]  Phone: [phone]          Patient: Merrill Cummings   MR Number: [id number]   YOB: 1955   Date of Visit: 1/23/2018       Dear Dr. Dustin Thompson:    Thank you for referring Merrill Cummings to me for evaluation. Attached you will find relevant portions of my assessment and plan of care.    If you have questions, please do not hesitate to call me. I look forward to following Merrill Cummings along with you.    Sincerely,    Sonya Wolf MD    Enclosure  CC:  No Recipients    If you would like to receive this communication electronically, please contact [email or web address] or [phone] to request more information on [employer] access.    For providers and/or their staff who would like to refer a patient to [facility], please contact us through our one-stop-shop provider referral line, [facility] , at [phone].    If you feel you have received this communication in error or would no longer like to receive these types of communications, please e-mail [email or web address]

## 2018-01-24 ENCOUNTER — SURGERY (OUTPATIENT)
Age: 63
End: 2018-01-24

## 2018-01-24 ENCOUNTER — ANESTHESIA EVENT (OUTPATIENT)
Dept: ENDOSCOPY | Facility: HOSPITAL | Age: 63
End: 2018-01-24
Payer: COMMERCIAL

## 2018-01-24 ENCOUNTER — ANESTHESIA (OUTPATIENT)
Dept: ENDOSCOPY | Facility: HOSPITAL | Age: 63
End: 2018-01-24
Payer: COMMERCIAL

## 2018-01-24 ENCOUNTER — HOSPITAL ENCOUNTER (OUTPATIENT)
Facility: HOSPITAL | Age: 63
Discharge: HOME | End: 2018-01-24
Attending: INTERNAL MEDICINE | Admitting: INTERNAL MEDICINE
Payer: COMMERCIAL

## 2018-01-24 VITALS
TEMPERATURE: 98 F | DIASTOLIC BLOOD PRESSURE: 79 MMHG | HEART RATE: 72 BPM | HEIGHT: 70 IN | SYSTOLIC BLOOD PRESSURE: 128 MMHG | BODY MASS INDEX: 28.77 KG/M2 | RESPIRATION RATE: 18 BRPM | WEIGHT: 201 LBS | OXYGEN SATURATION: 100 %

## 2018-01-24 DIAGNOSIS — K21.9 GERD (GASTROESOPHAGEAL REFLUX DISEASE): ICD-10-CM

## 2018-01-24 LAB — POCT GLUCOSE: 129 MG/DL (ref 70–110)

## 2018-01-24 PROCEDURE — 63600175 PHARM REV CODE 636 W HCPCS: Performed by: NURSE ANESTHETIST, CERTIFIED REGISTERED

## 2018-01-24 PROCEDURE — 43239 EGD BIOPSY SINGLE/MULTIPLE: CPT | Mod: ,,, | Performed by: INTERNAL MEDICINE

## 2018-01-24 PROCEDURE — 37000008 HC ANESTHESIA 1ST 15 MINUTES: Performed by: INTERNAL MEDICINE

## 2018-01-24 PROCEDURE — 27201012 HC FORCEPS, HOT/COLD, DISP: Performed by: INTERNAL MEDICINE

## 2018-01-24 PROCEDURE — 25000003 PHARM REV CODE 250: Performed by: NURSE ANESTHETIST, CERTIFIED REGISTERED

## 2018-01-24 PROCEDURE — 88305 TISSUE EXAM BY PATHOLOGIST: CPT | Mod: 26,,, | Performed by: PATHOLOGY

## 2018-01-24 PROCEDURE — 43239 EGD BIOPSY SINGLE/MULTIPLE: CPT | Performed by: INTERNAL MEDICINE

## 2018-01-24 PROCEDURE — 25000003 PHARM REV CODE 250: Performed by: INTERNAL MEDICINE

## 2018-01-24 PROCEDURE — 88305 TISSUE EXAM BY PATHOLOGIST: CPT | Performed by: PATHOLOGY

## 2018-01-24 RX ORDER — PANTOPRAZOLE SODIUM 40 MG/1
40 TABLET, DELAYED RELEASE ORAL
Qty: 60 TABLET | Refills: 1 | Status: SHIPPED | OUTPATIENT
Start: 2018-01-24 | End: 2018-04-03

## 2018-01-24 RX ORDER — SODIUM CHLORIDE, SODIUM LACTATE, POTASSIUM CHLORIDE, CALCIUM CHLORIDE 600; 310; 30; 20 MG/100ML; MG/100ML; MG/100ML; MG/100ML
INJECTION, SOLUTION INTRAVENOUS CONTINUOUS
Status: DISCONTINUED | OUTPATIENT
Start: 2018-01-24 | End: 2018-01-24 | Stop reason: HOSPADM

## 2018-01-24 RX ORDER — LIDOCAINE HYDROCHLORIDE 10 MG/ML
INJECTION INFILTRATION; PERINEURAL
Status: DISCONTINUED | OUTPATIENT
Start: 2018-01-24 | End: 2018-01-24

## 2018-01-24 RX ORDER — PROPOFOL 10 MG/ML
VIAL (ML) INTRAVENOUS
Status: DISCONTINUED | OUTPATIENT
Start: 2018-01-24 | End: 2018-01-24

## 2018-01-24 RX ADMIN — SODIUM CHLORIDE, SODIUM LACTATE, POTASSIUM CHLORIDE, AND CALCIUM CHLORIDE: .6; .31; .03; .02 INJECTION, SOLUTION INTRAVENOUS at 11:01

## 2018-01-24 RX ADMIN — PROPOFOL 30 MG: 10 INJECTION, EMULSION INTRAVENOUS at 12:01

## 2018-01-24 RX ADMIN — PROPOFOL 50 MG: 10 INJECTION, EMULSION INTRAVENOUS at 12:01

## 2018-01-24 RX ADMIN — LIDOCAINE HYDROCHLORIDE 50 MG: 10 INJECTION, SOLUTION INFILTRATION; PERINEURAL at 12:01

## 2018-01-24 NOTE — H&P (VIEW-ONLY)
"Subjective:    PCP: Dustin Thompson MD    Referring physician: Dr. Dustin Thompson      Patient ID: Merrill Cummings is a 63 y.o. male.    Chief Complaint: Gastroesophageal Reflux and Abdominal Pain (c/o dull pain in upper abd)      HPI   Merrill Cummings is a 63 y.o. /White male here today for    GERD  Paitent complains of heartburn. This has been associated with belching, dysphagia, heartburn and odynophagia. This is associated with mild discomfort in epigastric area. He denies unexpected weight loss. Symptoms have been present for 2 months. He has had dysphagia for solids. He has not lost weight. He denies melena, hematochezia, hematemesis, and coffee ground emesis. Medical therapy in the past has included H2 antagonists.   These symptoms started after he had a URI and cough. These symptoms settled down but heartburn has persisted.     He has had GERD for several years but has infrequent symptoms especially with pizza or if he eats late. He has been burping a lot adn food seems to backing up. Recently he had 3 episodes of mid back pain with jaw pain with hot and cold liquids. He has seen his cardiologist since and he believes the symptoms are unrelated to cardiac pathology.    Tried Pepcid OTC and it helped. Had been taking Tums previously.     No NSAIDs or anticoagulants.    Past Medical History:   Diagnosis Date    Anticoagulant long-term use     Diabetes mellitus 3-4yrs    Borderline    HEARING LOSS     Hyperlipidemia     Hypertension     Kidney stone     Muscular dystrophy 2012    diagnosed neurology [facility] -wears leg braces    Urolithiasis        Past Surgical History:   Procedure Laterality Date    ANKLE FRACTURE SURGERY      CIRCUMCISION      COLONOSCOPY  ~2004 or 05  ([place]?  [place]?)    "Nothing"    EXTRACORPOREAL SHOCK WAVE LITHOTRIPSY  2002, 2004    x 2 - dr chivo joshi ([facility])    MULTIPLE TOOTH EXTRACTIONS      ORIF ankle fracture  1988       Family History   Problem " Relation Age of Onset    Diabetes Father     Hypertension Father     Muscular dystrophy Father     Cancer Father      prostate, did not die of/lung,  of    Hypertension Mother     Scoliosis Sister     Pulmonary embolism Paternal Grandfather     Prostate cancer Paternal Grandfather        Social History     Social History    Marital status:      Spouse name: N/A    Number of children: N/A    Years of education: N/A     Occupational History    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  1    aspirin 81 MG Chew Take 81 mg by mouth once daily.      blood sugar diagnostic (BLOOD GLUCOSE TEST) Strp by Misc.(Non-Drug; Combo Route) route. One touch Ultra Blue      co-enzyme Q-10 30 mg capsule Take 100 mg by mouth once daily. Qunol 100 mg      famotidine (PEPCID) 20 MG tablet Take 20 mg by mouth 2 (two) times daily.      hydrochlorothiazide (HYDRODIURIL) 25 MG tablet Take 25 mg by mouth once daily.  1    losartan (COZAAR) 100 MG tablet Take 100 mg by mouth once daily.      metformin (GLUCOPHAGE) 1000 MG tablet Take 1,000 mg by mouth 2 (two) times daily.  1    metoprolol succinate (TOPROL-XL) 50 MG 24 hr tablet Take 50 mg by mouth 2 (two) times daily.  1    multivitamin with minerals tablet Take 1 tablet by mouth once daily.      ONETOUCH DELICA LANCETS 30 gauge Misc       oxybutynin (DITROPAN-XL) 10 MG 24 hr tablet Take 1 tablet (10 mg total) by mouth once daily. 30 tablet 11    simvastatin  "(ZOCOR) 40 MG tablet Take 1 tablet by mouth every other day.        No current facility-administered medications for this visit.        Review of Systems   Constitutional: Negative for activity change, appetite change, chills, diaphoresis, fatigue and fever.   HENT: Negative for congestion, ear pain, facial swelling, mouth sores, nosebleeds, rhinorrhea, sore throat and voice change.    Eyes: Negative for photophobia, pain, discharge, redness and visual disturbance.   Respiratory: Negative for apnea, cough, choking, chest tightness, shortness of breath and wheezing.    Cardiovascular: Negative for chest pain, palpitations and leg swelling.   Gastrointestinal:        As per HPI   Genitourinary: Negative for decreased urine volume, difficulty urinating, dysuria, frequency and hematuria.   Musculoskeletal: Negative for arthralgias, back pain, myalgias, neck pain and neck stiffness.   Skin: Negative for pallor and rash.   Neurological: Negative for tremors, seizures, syncope, weakness and headaches.   Hematological: Negative for adenopathy. Does not bruise/bleed easily.   Psychiatric/Behavioral: Negative for behavioral problems, confusion, hallucinations and sleep disturbance. The patient is not nervous/anxious.        Objective:   BP (!) 151/92   Pulse 91   Ht 5' 10" (1.778 m)   Wt 94.3 kg (207 lb 14.3 oz)   BMI 29.83 kg/m²   Wt Readings from Last 1 Encounters:   01/23/18 1256 94.3 kg (207 lb 14.3 oz)       Physical Exam   Constitutional: He is oriented to person, place, and time. He appears well-developed and well-nourished. No distress.   HENT:   Head: Normocephalic and atraumatic.   Mouth/Throat: Oropharynx is clear and moist.   Eyes: Conjunctivae and EOM are normal. Pupils are equal, round, and reactive to light. No scleral icterus.   Neck: Normal range of motion. Neck supple.   Cardiovascular: Normal rate, regular rhythm and normal heart sounds.    Pulmonary/Chest: Effort normal.   Abdominal: Soft. Bowel sounds " are normal. He exhibits no distension and no mass. There is no tenderness. There is no rebound and no guarding. No hernia.   Musculoskeletal: Normal range of motion. He exhibits no edema.   Lymphadenopathy:     He has no cervical adenopathy.   Neurological: He is alert and oriented to person, place, and time. He has normal reflexes.   Skin: Skin is warm and dry. No rash noted. He is not diaphoretic. No erythema. No pallor.   Psychiatric: He has a normal mood and affect. His behavior is normal. Judgment and thought content normal.       No results found for: WBC, HGB, HCT, MCV, PLT    Chemistry    No results found for: NA, K, CL, CO2, BUN, CREATININE, GLU No results found for: CALCIUM, ALKPHOS, AST, ALT, BILITOT, ESTGFRAFRICA, EGFRNONAA       No results found for: APTT  No results found for: INR, PROTIME    No components found for: VRS7215    Lab Results   Component Value Date    TSH 1.850 07/11/2012     No results found for: HGBA1C    No results found for: AMYLASE  No results found for: LIPASE    No results found for: HAV, HEPAIGM, HEPBIGM, HEPBCAB, HBEAG, HEPCAB  No results found for: PPD    No results found for: OCCULTBLOOD    No results found for: IRON, TIBC, FERRITIN, SATURATEDIRO    Assessment:      1. Gastroesophageal reflux disease, esophagitis presence not specified         Plan:     Gastroesophageal reflux disease, esophagitis presence not specified  -     Case request GI: ESOPHAGOGASTRODUODENOSCOPY (EGD)    Will prescribe PPI after EGD  Discussed GERD lifestyle changes such as avoiding trigger foods like coffee, citrus, tomato pastes, alcohol, mint, chocolate, keeping the head of bed elevated when lying down, avoid meals 3-4 hrs before bedtime and weight loss        Follow-up if symptoms worsen or fail to improve.      Sonya Wolf MD

## 2018-01-24 NOTE — TRANSFER OF CARE
"Anesthesia Transfer of Care Note    Patient: Merrill Cummings    Procedure(s) Performed: Procedure(s) (LRB):  ESOPHAGOGASTRODUODENOSCOPY (EGD) (N/A)    Patient location: GI    Anesthesia Type: MAC    Transport from OR: Transported from OR on room air with adequate spontaneous ventilation    Post pain: adequate analgesia    Post assessment: no apparent anesthetic complications    Post vital signs: stable    Level of consciousness: awake, alert and oriented    Nausea/Vomiting: no nausea/vomiting    Complications: none    Transfer of care protocol was followed      Last vitals:   Visit Vitals  BP (!) 138/91 (BP Location: Left arm, Patient Position: Sitting)   Pulse 72   Temp 36.9 °C (98.4 °F) (Oral)   Resp 16   Ht 5' 10" (1.778 m)   Wt 91.2 kg (201 lb)   SpO2 (!) 94%   BMI 28.84 kg/m²     "

## 2018-01-24 NOTE — PLAN OF CARE
Dr Wolf came to bedside and discussed findings. NO N/V,  no abdominal pain, no GI bleeding, and vitals stable.  Pt discharged from unit.

## 2018-01-24 NOTE — ANESTHESIA PREPROCEDURE EVALUATION
01/24/2018  Merrill Cummings is a 63 y.o., male.    Anesthesia Evaluation    I have reviewed the Patient Summary Reports.    I have reviewed the Nursing Notes.   I have reviewed the Medications.     Review of Systems  Anesthesia Hx:  No problems with previous Anesthesia    Social:  Non-Smoker    Cardiovascular:   Hypertension  Hypertension, Essential Hypertension    Renal/:   Chronic Renal Disease renal calculi  Denies Kidney Function/Disease    Hepatic/GI:   GERD  Esophageal / Stomach Disorders Gerd    Neurological:   Neuromuscular Disease,  Neuromuscular Disease, Muscular Dystrophy   Endocrine:   Diabetes, type 2  Diabetes, Type 2 Diabetes , controlled by oral hypoglycemics.        Physical Exam  General:  Well nourished    Airway/Jaw/Neck:  Airway Findings: Mouth Opening: Normal Tongue: Normal  General Airway Assessment: Adult       Chest/Lungs:  Chest/Lungs Findings: Normal Respiratory Rate     Heart/Vascular:  Heart Findings: Rate: Normal             Anesthesia Plan  Type of Anesthesia, risks & benefits discussed:  Anesthesia Type:  MAC  Patient's Preference:   Intra-op Monitoring Plan: standard ASA monitors  Intra-op Monitoring Plan Comments:   Post Op Pain Control Plan:   Post Op Pain Control Plan Comments:   Induction:   IV  Beta Blocker:  Patient is not currently on a Beta-Blocker (No further documentation required).       Informed Consent: Patient understands risks and agrees with Anesthesia plan.  Questions answered. Anesthesia consent signed with patient.  ASA Score: 2     Day of Surgery Review of History & Physical: I have interviewed and examined the patient. I have reviewed the patient's H&P dated:  There are no significant changes.          Ready For Surgery From Anesthesia Perspective.

## 2018-01-24 NOTE — ANESTHESIA POSTPROCEDURE EVALUATION
"Anesthesia Post Evaluation    Patient: Merrill Cummings    Procedure(s) Performed: Procedure(s) (LRB):  ESOPHAGOGASTRODUODENOSCOPY (EGD) (N/A)    Final Anesthesia Type: MAC  Patient location during evaluation: GI PACU  Patient participation: Yes- Able to Participate  Level of consciousness: awake and alert and oriented  Post-procedure vital signs: reviewed and stable  Pain management: adequate  Airway patency: patent  PONV status at discharge: No PONV  Anesthetic complications: no      Cardiovascular status: blood pressure returned to baseline  Respiratory status: unassisted, room air and spontaneous ventilation  Hydration status: euvolemic  Follow-up not needed.        Visit Vitals  /79   Pulse 72   Temp 36.8 °C (98.2 °F) (Oral)   Resp 18   Ht 5' 10" (1.778 m)   Wt 91.2 kg (201 lb)   SpO2 100%   BMI 28.84 kg/m²       Pain/Cali Score: Pain Assessment Performed: Yes (1/24/2018 12:56 PM)  Presence of Pain: denies (1/24/2018 12:56 PM)  Cali Score: 10 (1/24/2018 12:56 PM)      "

## 2018-01-24 NOTE — DISCHARGE INSTRUCTIONS
What Is a Hiatal Hernia?    Hiatal hernia is when the area where the stomach and esophagus meet bulges up through the diaphragm into the chest cavity. In some cases, part of the stomach may bulge above the diaphragm. Stomach acid may move up into the esophagus and cause symptoms. The symptoms are often blamed on gastroesophageal reflux disease (GERD). You may only know about the hernia when it shows up on an X-ray taken for other reasons.   What you may feel  The hiatus is a normal hole in the diaphragm. The esophagus passes through this hole and leads to the stomach. In some cases, part of the stomach may bulge above the diaphragm. This bulge is called a hernia. Stomach acid may move up into the esophagus and cause symptoms.  When you eat, the muscle at the hiatus relaxes to allow food to pass into the stomach. It tightens again to keep food and digestive acids in the stomach.  Many people with hiatal hernias have mild symptoms. You may notice the following GERD symptoms:  · Heartburn or other chest discomfort  · A feeling of chest fullness after a meal  · Frequent burping  · Acid taste in the mouth  · Trouble swallowing  Treating symptoms  If you have been diagnosed with hiatal hernia, these suggestions may help improve symptoms:  · Lose excess weight. Extra weight puts pressure on the stomach and esophagus.  · Dont lie down after eating. Sit up for at least an hour after eating. Lying down after eating can increase symptoms.  · Avoid certain foods and drinks. These include fatty foods, chocolate, coffee, mint, and other foods that cause symptoms for you.  · Dont smoke or drink alcohol. These can worsen symptoms.  · Look at your medicines. Discuss your medicines with your healthcare provider. Many medicines can cause symptoms.  · Consider an antacid medicine. Ask your healthcare provider about over-the-counter and prescription medicines that may help.  · Ask about surgery, if needed. Surgery is a treatment  choice for some people. Your healthcare provider can determine if surgery is an option for you.    Date Last Reviewed: 10/1/2016  © 1503-1971 [employer]. [address] [place] [address], [place] [address]. All rights reserved. This information is not intended as a substitute for professional medical care. Always follow your healthcare professional's instructions.        Gastritis (Adult)    Gastritis is inflammation and irritation of the stomach lining. It can be present for a short time (acute) or be long lasting (chronic). Gastritis is often caused by infection with bacteria called H pylori. More than a third of people in the [place] have this bacteria in their bodies. In many cases, H pylori causes no problems or symptoms. In some people, though, the infection irritates the stomach lining and causes gastritis. Other causes of stomach irritation include drinking alcohol or taking pain-relieving medicines called NSAIDs (such as aspirin or ibuprofen).   Symptoms of gastritis can include:  · Abdominal pain or bloating  · Loss of appetite  · Nausea or vomiting  · Vomiting blood or having black stools  · Feeling more tired than usual  An inflamed and irritated stomach lining is more likely to develop a sore called an ulcer. To help prevent this, gastritis should be treated.  Home care  If needed, medicines may be prescribed. If you have H pylori infection, treating it will likely relieve your symptoms. Other changes can help reduce stomach irritation and help it heal.  · If you have been prescribed medicines for H pylori infection, take them as directed. Take all of the medicine until it is finished or your healthcare provider tells you to stop, even if you feel better.  · Your healthcare provider may recommend avoiding NSAIDs. If you take daily aspirin for your heart or other medical reasons, do not stop without talking to your healthcare provider first.  · Avoid drinking alcohol.  · Stop smoking. Smoking can  irritate the stomach and delay healing. As much as possible, stay away from second hand smoke.  Follow-up care  Follow up with your healthcare provider, or as advised by our staff. Testing may be needed to check for inflammation or an ulcer.  When to seek medical advice  Call your healthcare provider for any of the following:  · Stomach pain that gets worse or moves to the lower right abdomen (appendix area)  · Chest pain that appears or gets worse, or spreads to the back, neck, shoulder, or arm  · Frequent vomiting (cant keep down liquids)  · Blood in the stool or vomit (red or black in color)  · Feeling weak or dizzy  · Fever of 100.4ºF (38ºC) or higher, or as directed by your healthcare provider  Date Last Reviewed: 6/22/2015  © 5784-8277 [employer]. [address] [place] [address], [place] [address]. All rights reserved. This information is not intended as a substitute for professional medical care. Always follow your healthcare professional's instructions.

## 2018-01-29 ENCOUNTER — OFFICE VISIT (OUTPATIENT)
Dept: GASTROENTEROLOGY | Facility: CLINIC | Age: 63
End: 2018-01-29
Payer: COMMERCIAL

## 2018-01-29 VITALS
BODY MASS INDEX: 28.53 KG/M2 | HEIGHT: 70 IN | WEIGHT: 199.31 LBS | HEART RATE: 74 BPM | SYSTOLIC BLOOD PRESSURE: 142 MMHG | DIASTOLIC BLOOD PRESSURE: 84 MMHG

## 2018-01-29 DIAGNOSIS — K82.4 GALLBLADDER POLYP: Primary | ICD-10-CM

## 2018-01-29 DIAGNOSIS — A04.8 H. PYLORI INFECTION: ICD-10-CM

## 2018-01-29 DIAGNOSIS — R07.89 ATYPICAL CHEST PAIN: ICD-10-CM

## 2018-01-29 PROCEDURE — 99999 PR PBB SHADOW E&M-EST. PATIENT-LVL III: CPT | Mod: PBBFAC,,, | Performed by: NURSE PRACTITIONER

## 2018-01-29 PROCEDURE — 3008F BODY MASS INDEX DOCD: CPT | Mod: S$GLB,,, | Performed by: NURSE PRACTITIONER

## 2018-01-29 PROCEDURE — 99214 OFFICE O/P EST MOD 30 MIN: CPT | Mod: S$GLB,,, | Performed by: NURSE PRACTITIONER

## 2018-01-29 RX ORDER — CLARITHROMYCIN 500 MG/1
500 TABLET, FILM COATED ORAL 2 TIMES DAILY
Qty: 28 TABLET | Refills: 0 | Status: SHIPPED | OUTPATIENT
Start: 2018-01-29 | End: 2018-02-12

## 2018-01-29 RX ORDER — METRONIDAZOLE 500 MG/1
500 TABLET ORAL 3 TIMES DAILY
Qty: 42 TABLET | Refills: 0 | Status: SHIPPED | OUTPATIENT
Start: 2018-01-29 | End: 2018-02-12

## 2018-01-31 ENCOUNTER — HOSPITAL ENCOUNTER (OUTPATIENT)
Dept: RADIOLOGY | Facility: HOSPITAL | Age: 63
Discharge: HOME | End: 2018-01-31
Attending: NURSE PRACTITIONER
Payer: COMMERCIAL

## 2018-01-31 DIAGNOSIS — K82.4 GALLBLADDER POLYP: ICD-10-CM

## 2018-01-31 PROCEDURE — 76705 ECHO EXAM OF ABDOMEN: CPT | Mod: TC,PO

## 2018-01-31 PROCEDURE — 76705 ECHO EXAM OF ABDOMEN: CPT | Mod: 26,,, | Performed by: RADIOLOGY

## 2018-01-31 NOTE — PROGRESS NOTES
Clinic Consult:  [facility] Consultation Note    Reason for Consult:  The primary encounter diagnosis was Gallbladder polyp. Diagnoses of H. pylori infection and Atypical chest pain were also pertinent to this visit.    PCP: Dustin Thompson       HPI:  This is a 63 y.o. male here for evaluation of the above. He is established with GI department but is new to me. He has seen Dr. Wolf in the past. He has abdominal pain and GERD. Onset about 2 months ago. Abdominal pain is located in the upper abdomen and is episodic. He reports having 4 episodes over th last year and episodes are becoming more frequent. Associated symptoms include dysphagia with solids only. His epigastric pain radiates to his back and jaw. No recent NSAID use. No  Hematochezia or melena. He has tried H2 blockers in the past without relief. He had recent EGD on 1/24/18 which revealed non-bleeding erosive gastritis. Pathology report reviewed and was positive for H. Pylori. Prior abdominal ultrasound from 2014 reviewed and revealed fatty liver and multiple small gallbladder polyps measuring 3-5 mm.     Review of Systems   Constitutional: Negative for fever, malaise/fatigue and weight loss.   HENT: Negative for sore throat.    Respiratory: Negative for cough and wheezing.    Cardiovascular: Negative for chest pain and palpitations.   Gastrointestinal: Positive for abdominal pain and heartburn. Negative for blood in stool, constipation, diarrhea, melena, nausea and vomiting.   Genitourinary: Negative for dysuria and frequency.   Musculoskeletal: Negative for back pain, joint pain, myalgias and neck pain.   Skin: Negative for itching and rash.   Neurological: Negative for dizziness, speech change, seizures, loss of consciousness and headaches.   Psychiatric/Behavioral: Negative for depression and substance abuse. The patient is not nervous/anxious.        Medical History:  has a past medical history of Anticoagulant long-term use; Diabetes  mellitus (3-4yrs); HEARING LOSS; Hyperlipidemia; Hypertension; Kidney stone; Muscular dystrophy (2012); and Urolithiasis.    Surgical History:  has a past surgical history that includes ORIF ankle fracture (1988); Ankle fracture surgery; Extracorporeal shock wave lithotripsy (2002, 2004); Circumcision; Multiple tooth extractions; and Colonoscopy (~2004 or 05  ([place]?  [place]?)).    Family History: family history includes Cancer in his father; Diabetes in his father; Hypertension in his father and mother; Muscular dystrophy in his father; Prostate cancer in his paternal grandfather; Pulmonary embolism in his paternal grandfather; Scoliosis in his sister..     Social History:  reports that he has never smoked. He has never used smokeless tobacco. He reports that he does not drink alcohol or use drugs.    Allergies: Reviewed    Home Medications:   Current Outpatient Prescriptions on File Prior to Visit   Medication Sig Dispense Refill    amLODIPine (NORVASC) 5 MG tablet Take 5 mg by mouth once daily.  1    blood sugar diagnostic (BLOOD GLUCOSE TEST) Strp by Misc.(Non-Drug; Combo Route) route. One touch Ultra Blue      co-enzyme Q-10 30 mg capsule Take 100 mg by mouth once daily. Qunol 100 mg      hydrochlorothiazide (HYDRODIURIL) 25 MG tablet Take 25 mg by mouth once daily.  1    losartan (COZAAR) 100 MG tablet Take 100 mg by mouth once daily.      metformin (GLUCOPHAGE) 1000 MG tablet Take 1,000 mg by mouth 2 (two) times daily.  1    metoprolol succinate (TOPROL-XL) 50 MG 24 hr tablet Take 50 mg by mouth 2 (two) times daily.  1    multivitamin with minerals tablet Take 1 tablet by mouth once daily.      ONETOUCH DELICA LANCETS 30 gauge Misc       oxybutynin (DITROPAN-XL) 10 MG 24 hr tablet Take 1 tablet (10 mg total) by mouth once daily. 30 tablet 11    pantoprazole (PROTONIX) 40 MG tablet Take 1 tablet (40 mg total) by mouth 2 (two) times daily before meals. 60 tablet 1    simvastatin (ZOCOR) 40 MG  "tablet Take 1 tablet by mouth every other day.       aspirin 81 MG Chew Take 81 mg by mouth once daily.      famotidine (PEPCID) 20 MG tablet Take 20 mg by mouth 2 (two) times daily.       No current facility-administered medications on file prior to visit.        Physical Exam:  Vital Signs:  BP (!) 142/84   Pulse 74   Ht 5' 10" (1.778 m)   Wt 90.4 kg (199 lb 4.7 oz)   BMI 28.60 kg/m²   Body mass index is 28.6 kg/m².  Physical Exam   Constitutional: He is oriented to person, place, and time and well-developed, well-nourished, and in no distress. No distress.   HENT:   Head: Normocephalic.   Eyes: Conjunctivae are normal. Pupils are equal, round, and reactive to light.   Cardiovascular: Normal rate, regular rhythm and normal heart sounds.    Pulmonary/Chest: Effort normal and breath sounds normal. No respiratory distress.   Abdominal: Soft. Bowel sounds are normal. He exhibits no distension. There is no tenderness.   Neurological: He is alert and oriented to person, place, and time. No cranial nerve deficit.   Skin: Skin is warm and dry. No rash noted.   Psychiatric: Mood and affect normal.       Labs: Pertinent labs reviewed.      Assessment:  1. Gallbladder polyp    2. H. pylori infection    3. Atypical chest pain           Recommendations:  Gallbladder polyp  - repeat ultrasound for gallbadder polyp surveillance.   - consider general surgery referral  -     US Abdomen Limited; Future; Expected date: 01/29/2018    H. pylori infection  - H. Pylori identified on pathology report.  - will start treatment. He will need stool test after completion of medications. He will need to stop PPI x 2 weeks prior to stool collection.   - likely cause for symptoms.   - other possible etiologies include symptomatic gallbladder polyps.   -     metroNIDAZOLE (FLAGYL) 500 MG tablet; Take 1 tablet (500 mg total) by mouth 3 (three) times daily.  Dispense: 42 tablet; Refill: 0  -     clarithromycin (BIAXIN) 500 MG tablet; Take 1 " tablet (500 mg total) by mouth 2 (two) times daily.  Dispense: 28 tablet; Refill: 0    Atypical chest pain  - likely caused from H. Pylori infection  - recommend expedited cardiology follow up to R/O cardiac etiology    Follow-up in about 2 weeks (around 2/12/2018).    Thank you so much for allowing me to participate in the care of EDWARD Butler

## 2018-02-02 ENCOUNTER — OFFICE VISIT (OUTPATIENT)
Dept: FAMILY MEDICINE | Facility: CLINIC | Age: 63
End: 2018-02-02
Payer: COMMERCIAL

## 2018-02-02 VITALS
WEIGHT: 204.5 LBS | BODY MASS INDEX: 29.28 KG/M2 | SYSTOLIC BLOOD PRESSURE: 122 MMHG | HEIGHT: 70 IN | TEMPERATURE: 99 F | HEART RATE: 91 BPM | DIASTOLIC BLOOD PRESSURE: 73 MMHG

## 2018-02-02 DIAGNOSIS — J30.9 ALLERGIC SINUSITIS: Primary | ICD-10-CM

## 2018-02-02 PROCEDURE — 99999 PR PBB SHADOW E&M-EST. PATIENT-LVL III: CPT | Mod: PBBFAC,,, | Performed by: NURSE PRACTITIONER

## 2018-02-02 PROCEDURE — 3008F BODY MASS INDEX DOCD: CPT | Mod: S$GLB,,, | Performed by: NURSE PRACTITIONER

## 2018-02-02 PROCEDURE — 99213 OFFICE O/P EST LOW 20 MIN: CPT | Mod: S$GLB,,, | Performed by: NURSE PRACTITIONER

## 2018-02-02 RX ORDER — PROMETHAZINE HYDROCHLORIDE AND DEXTROMETHORPHAN HYDROBROMIDE 6.25; 15 MG/5ML; MG/5ML
5 SYRUP ORAL 3 TIMES DAILY PRN
Qty: 118 ML | Refills: 0 | Status: SHIPPED | OUTPATIENT
Start: 2018-02-02 | End: 2018-02-12

## 2018-02-02 RX ORDER — LEVOCETIRIZINE DIHYDROCHLORIDE 5 MG/1
5 TABLET, FILM COATED ORAL NIGHTLY
Qty: 30 TABLET | Refills: 2 | Status: SHIPPED | OUTPATIENT
Start: 2018-02-02 | End: 2018-04-03

## 2018-02-02 NOTE — PROGRESS NOTES
Subjective:       Patient ID: Merrill Cummings is a 63 y.o. male.    Chief Complaint: Cough and Fever    Sinus Problem   This is a recurrent problem. The current episode started 1 to 4 weeks ago. The problem has been waxing and waning since onset. There has been no fever. The pain is mild. Associated symptoms include congestion, coughing, headaches, sinus pressure and sneezing. Pertinent negatives include no ear pain, shortness of breath or sore throat. Past treatments include oral decongestants. The treatment provided no relief.       Review of Systems   Constitutional: Negative for fatigue, fever and unexpected weight change.   HENT: Positive for congestion, sinus pressure and sneezing. Negative for ear pain and sore throat.    Eyes: Negative.  Negative for pain and visual disturbance.   Respiratory: Positive for cough. Negative for shortness of breath.    Cardiovascular: Negative for chest pain and palpitations.   Gastrointestinal: Negative for abdominal pain, diarrhea, nausea and vomiting.   Genitourinary: Negative for dysuria and frequency.   Musculoskeletal: Negative for arthralgias and myalgias.   Skin: Negative for color change and rash.   Neurological: Positive for headaches. Negative for dizziness.   Psychiatric/Behavioral: Negative for sleep disturbance. The patient is not nervous/anxious.        Vitals:    02/02/18 0922   BP: 122/73   Pulse: 91   Temp: 98.8 °F (37.1 °C)       Objective:     Current Outpatient Prescriptions   Medication Sig Dispense Refill    amLODIPine (NORVASC) 5 MG tablet Take 5 mg by mouth once daily.  1    blood sugar diagnostic (BLOOD GLUCOSE TEST) Strp by Misc.(Non-Drug; Combo Route) route. One touch Ultra Blue      clarithromycin (BIAXIN) 500 MG tablet Take 1 tablet (500 mg total) by mouth 2 (two) times daily. 28 tablet 0    co-enzyme Q-10 30 mg capsule Take 100 mg by mouth once daily. Qunol 100 mg      hydrochlorothiazide (HYDRODIURIL) 25 MG tablet Take 25 mg by mouth once  daily.  1    losartan (COZAAR) 100 MG tablet Take 100 mg by mouth once daily.      metformin (GLUCOPHAGE) 1000 MG tablet Take 1,000 mg by mouth 2 (two) times daily.  1    metoprolol succinate (TOPROL-XL) 50 MG 24 hr tablet Take 50 mg by mouth 2 (two) times daily.  1    metroNIDAZOLE (FLAGYL) 500 MG tablet Take 1 tablet (500 mg total) by mouth 3 (three) times daily. 42 tablet 0    multivitamin with minerals tablet Take 1 tablet by mouth once daily.      ONETOUCH DELICA LANCETS 30 gauge Misc       oxybutynin (DITROPAN-XL) 10 MG 24 hr tablet Take 1 tablet (10 mg total) by mouth once daily. 30 tablet 11    pantoprazole (PROTONIX) 40 MG tablet Take 1 tablet (40 mg total) by mouth 2 (two) times daily before meals. 60 tablet 1    simvastatin (ZOCOR) 40 MG tablet Take 1 tablet by mouth every other day.       aspirin 81 MG Chew Take 81 mg by mouth once daily.      famotidine (PEPCID) 20 MG tablet Take 20 mg by mouth 2 (two) times daily.      fluticasone (VERAMYST) 27.5 mcg/actuation nasal spray 2 sprays by Nasal route once daily. 10 g 2    levocetirizine (XYZAL) 5 MG tablet Take 1 tablet (5 mg total) by mouth every evening. For allergy symptoms 30 tablet 2    promethazine-dextromethorphan (PROMETHAZINE-DM) 6.25-15 mg/5 mL Syrp Take 5 mLs by mouth 3 (three) times daily as needed. 118 mL 0     No current facility-administered medications for this visit.        Physical Exam   Constitutional: He is oriented to person, place, and time. He appears well-developed. No distress.   HENT:   Head: Normocephalic and atraumatic.   Right Ear: Tympanic membrane normal.   Left Ear: Tympanic membrane normal.   Nose: Mucosal edema present.   Mouth/Throat: Posterior oropharyngeal edema (post nasal mucus) present.   Eyes: EOM are normal. Pupils are equal, round, and reactive to light.   Neck: Normal range of motion. Neck supple.   Cardiovascular: Normal rate and regular rhythm.    Pulmonary/Chest: Effort normal and breath sounds  normal.   Musculoskeletal: Normal range of motion.   Neurological: He is alert and oriented to person, place, and time.   Skin: Skin is warm and dry. No rash noted.   Psychiatric: He has a normal mood and affect. Thought content normal.   Nursing note and vitals reviewed.      Assessment:       1. Allergic sinusitis        Plan:   Allergic sinusitis    Other orders  -     levocetirizine (XYZAL) 5 MG tablet; Take 1 tablet (5 mg total) by mouth every evening. For allergy symptoms  Dispense: 30 tablet; Refill: 2  -     fluticasone (VERAMYST) 27.5 mcg/actuation nasal spray; 2 sprays by Nasal route once daily.  Dispense: 10 g; Refill: 2  -     promethazine-dextromethorphan (PROMETHAZINE-DM) 6.25-15 mg/5 mL Syrp; Take 5 mLs by mouth 3 (three) times daily as needed.  Dispense: 118 mL; Refill: 0        Follow-up if symptoms worsen or fail to improve.

## 2018-02-09 ENCOUNTER — TELEPHONE (OUTPATIENT)
Dept: FAMILY MEDICINE | Facility: CLINIC | Age: 63
End: 2018-02-09

## 2018-02-09 RX ORDER — MOMETASONE FUROATE 50 UG/1
2 SPRAY, METERED NASAL DAILY
Qty: 17 G | Refills: 5 | Status: SHIPPED | OUTPATIENT
Start: 2018-02-09 | End: 2018-04-03

## 2018-02-12 ENCOUNTER — OFFICE VISIT (OUTPATIENT)
Dept: GASTROENTEROLOGY | Facility: CLINIC | Age: 63
End: 2018-02-12
Payer: COMMERCIAL

## 2018-02-12 VITALS
HEART RATE: 80 BPM | WEIGHT: 198.63 LBS | BODY MASS INDEX: 28.44 KG/M2 | HEIGHT: 70 IN | DIASTOLIC BLOOD PRESSURE: 82 MMHG | SYSTOLIC BLOOD PRESSURE: 132 MMHG

## 2018-02-12 DIAGNOSIS — K82.4 GALLBLADDER POLYP: Primary | ICD-10-CM

## 2018-02-12 DIAGNOSIS — A04.8 H. PYLORI INFECTION: ICD-10-CM

## 2018-02-12 PROCEDURE — 3008F BODY MASS INDEX DOCD: CPT | Mod: S$GLB,,, | Performed by: NURSE PRACTITIONER

## 2018-02-12 PROCEDURE — 99214 OFFICE O/P EST MOD 30 MIN: CPT | Mod: S$GLB,,, | Performed by: NURSE PRACTITIONER

## 2018-02-12 PROCEDURE — 99999 PR PBB SHADOW E&M-EST. PATIENT-LVL III: CPT | Mod: PBBFAC,,, | Performed by: NURSE PRACTITIONER

## 2018-02-12 NOTE — PATIENT INSTRUCTIONS
Discontinue pantoprazole (Protonix) for minimum 2 weeks before collecting stool sample. May restart medication once a day if heartburn returns.

## 2018-02-12 NOTE — PROGRESS NOTES
"Clinic Follow Up:  [facility] Follow Up Note    Reason for Follow Up:  The primary encounter diagnosis was Gallbladder polyp. A diagnosis of H. pylori infection was also pertinent to this visit.    PCP: Dustin Thompson       HPI:  This is a 63 y.o. male here for follow up of the above. He reports resolution of symptoms. Antibiotic treatment for H. Pylori will be completed tomorrow. Recent ultrasound showed stable multiple gallbladder polyps with the largest one measuring up to 6mm.     Review of Systems   Constitutional: Negative for activity change and appetite change.   HENT: Negative for sore throat and trouble swallowing.    Eyes: Negative for pain and discharge.   Respiratory: Negative for cough and chest tightness.    Cardiovascular: Negative for chest pain and palpitations.   Gastrointestinal: Negative for abdominal distention, abdominal pain, anal bleeding, blood in stool, constipation, diarrhea, nausea, rectal pain and vomiting.   Genitourinary: Negative for dysuria, frequency and hematuria.   Skin: Negative for color change and rash.   Neurological: Negative for speech difficulty, weakness and headaches.   Psychiatric/Behavioral: Negative for confusion and sleep disturbance.       Medical History:  Past Medical History:   Diagnosis Date    Anticoagulant long-term use     Diabetes mellitus 3-4yrs    Borderline    H. pylori infection 02/01/2018    had EGD, undergoing treatment    HEARING LOSS     Hyperlipidemia     Hypertension     Kidney stone     Muscular dystrophy 2012    diagnosed neurology [facility] -wears leg braces    Urolithiasis        Surgical History:   Past Surgical History:   Procedure Laterality Date    ANKLE FRACTURE SURGERY      CIRCUMCISION      COLONOSCOPY  ~2004 or 05  ([place]?  [place]    "Nothing"    EXTRACORPOREAL SHOCK WAVE LITHOTRIPSY  2002, 2004    x 2 - dr chivo j[facility])    MULTIPLE TOOTH EXTRACTIONS      ORIF ankle fracture  1988 "       Family History:   Family History   Problem Relation Age of Onset    Diabetes Father     Hypertension Father     Muscular dystrophy Father     Cancer Father      prostate, did not die of/lung,  of    Hypertension Mother     Scoliosis Sister     Pulmonary embolism Paternal Grandfather     Prostate cancer Paternal Grandfather        Social History:   Social History   Substance Use Topics    Smoking status: Never Smoker    Smokeless tobacco: Never Used    Alcohol use No       Allergies:   Review of patient's allergies indicates:   Allergen Reactions    Nexium [esomeprazole magnesium] Rash       Home Medications:  Current Outpatient Prescriptions on File Prior to Visit   Medication Sig Dispense Refill    amLODIPine (NORVASC) 5 MG tablet Take 5 mg by mouth once daily.  1    aspirin 81 MG Chew Take 81 mg by mouth once daily.      blood sugar diagnostic (BLOOD GLUCOSE TEST) Strp by Misc.(Non-Drug; Combo Route) route. One touch Ultra Blue      clarithromycin (BIAXIN) 500 MG tablet Take 1 tablet (500 mg total) by mouth 2 (two) times daily. 28 tablet 0    famotidine (PEPCID) 20 MG tablet Take 20 mg by mouth 2 (two) times daily.      hydrochlorothiazide (HYDRODIURIL) 25 MG tablet Take 25 mg by mouth once daily.  1    levocetirizine (XYZAL) 5 MG tablet Take 1 tablet (5 mg total) by mouth every evening. For allergy symptoms 30 tablet 2    losartan (COZAAR) 100 MG tablet Take 100 mg by mouth once daily.      metformin (GLUCOPHAGE) 1000 MG tablet Take 1,000 mg by mouth 2 (two) times daily.  1    metoprolol succinate (TOPROL-XL) 50 MG 24 hr tablet Take 50 mg by mouth 2 (two) times daily.  1    metroNIDAZOLE (FLAGYL) 500 MG tablet Take 1 tablet (500 mg total) by mouth 3 (three) times daily. 42 tablet 0    mometasone (NASONEX) 50 mcg/actuation nasal spray 2 sprays by Nasal route once daily. 17 g 5    ONETOUCH DELICA LANCETS 30 gauge Misc       oxybutynin (DITROPAN-XL) 10 MG 24 hr tablet Take 1  "tablet (10 mg total) by mouth once daily. 30 tablet 11    pantoprazole (PROTONIX) 40 MG tablet Take 1 tablet (40 mg total) by mouth 2 (two) times daily before meals. 60 tablet 1    simvastatin (ZOCOR) 40 MG tablet Take 1 tablet by mouth every other day.       co-enzyme Q-10 30 mg capsule Take 100 mg by mouth once daily. Qunol 100 mg      multivitamin with minerals tablet Take 1 tablet by mouth once daily.      [DISCONTINUED] promethazine-dextromethorphan (PROMETHAZINE-DM) 6.25-15 mg/5 mL Syrp Take 5 mLs by mouth 3 (three) times daily as needed. 118 mL 0     No current facility-administered medications on file prior to visit.        Physical Exam:  Vital Signs:  /82   Pulse 80   Ht 5' 10" (1.778 m)   Wt 90.1 kg (198 lb 10.2 oz)   BMI 28.50 kg/m²   Body mass index is 28.5 kg/m².  Physical Exam   Constitutional: He is oriented to person, place, and time and well-developed, well-nourished, and in no distress. No distress.   HENT:   Head: Normocephalic.   Eyes: Conjunctivae are normal. Pupils are equal, round, and reactive to light.   Cardiovascular: Normal rate, regular rhythm and normal heart sounds.    Pulmonary/Chest: Effort normal and breath sounds normal. No respiratory distress.   Abdominal: Soft. Bowel sounds are normal. He exhibits no distension. There is no tenderness.   Neurological: He is alert and oriented to person, place, and time. No cranial nerve deficit.   Skin: Skin is warm and dry. No rash noted.   Psychiatric: Mood and affect normal.       Labs: Pertinent labs reviewed.    Assessment:  1. Gallbladder polyp    2. H. pylori infection        Recommendations:  Gallbladder polyp  - asymptomatic   - discussed with patient options for surveillance or referral to general surgery.  - opted for surveillance. Generally for polyps between 6-9 mm need surveillance at 3 moths, 6 months, then yearly.   - will repeat US in about 6 months.   -     US Abdomen Limited; Future; Expected date: " 08/12/2018    H pylori infection  - after completion of ABX, discontinue pantoprazole.  - he will need a stool test to ensure adequate treatment.  - he should be off pantoprazole for minimum of 2 week prior to collection.   -     H. pylori antigen, stool; Future; Expected date: 02/12/2018    Return to Clinic:  Follow up to be determined after results and in 6 months.      Thank you for the opportunity to participate in the care of this patient.  EDWARD Ball

## 2018-03-01 ENCOUNTER — LAB VISIT (OUTPATIENT)
Dept: LAB | Facility: HOSPITAL | Age: 63
End: 2018-03-01
Attending: NURSE PRACTITIONER
Payer: COMMERCIAL

## 2018-03-01 DIAGNOSIS — A04.8 H. PYLORI INFECTION: ICD-10-CM

## 2018-03-01 PROCEDURE — 87338 HPYLORI STOOL AG IA: CPT

## 2018-03-05 ENCOUNTER — OFFICE VISIT (OUTPATIENT)
Dept: DERMATOLOGY | Facility: CLINIC | Age: 63
End: 2018-03-05
Payer: COMMERCIAL

## 2018-03-05 VITALS — HEIGHT: 70 IN | WEIGHT: 198 LBS | BODY MASS INDEX: 28.35 KG/M2

## 2018-03-05 DIAGNOSIS — L82.1 SEBORRHEIC KERATOSES: ICD-10-CM

## 2018-03-05 DIAGNOSIS — D22.9 MULTIPLE BENIGN NEVI: ICD-10-CM

## 2018-03-05 DIAGNOSIS — D48.5 NEOPLASM OF UNCERTAIN BEHAVIOR OF SKIN: ICD-10-CM

## 2018-03-05 DIAGNOSIS — L57.0 ACTINIC KERATOSIS: ICD-10-CM

## 2018-03-05 DIAGNOSIS — L73.8 SEBACEOUS HYPERPLASIA: ICD-10-CM

## 2018-03-05 DIAGNOSIS — L81.4 LENTIGINES: ICD-10-CM

## 2018-03-05 DIAGNOSIS — L21.9 SEBORRHEIC DERMATITIS: ICD-10-CM

## 2018-03-05 DIAGNOSIS — R21 RASH AND NONSPECIFIC SKIN ERUPTION: Primary | ICD-10-CM

## 2018-03-05 PROCEDURE — 17003 DESTRUCT PREMALG LES 2-14: CPT | Mod: S$GLB,,, | Performed by: DERMATOLOGY

## 2018-03-05 PROCEDURE — 99999 PR PBB SHADOW E&M-EST. PATIENT-LVL III: CPT | Mod: PBBFAC,,, | Performed by: DERMATOLOGY

## 2018-03-05 PROCEDURE — 11100 PR BIOPSY OF SKIN LESION: CPT | Mod: 59,S$GLB,, | Performed by: DERMATOLOGY

## 2018-03-05 PROCEDURE — 88312 SPECIAL STAINS GROUP 1: CPT | Mod: 26,,, | Performed by: PATHOLOGY

## 2018-03-05 PROCEDURE — 88305 TISSUE EXAM BY PATHOLOGIST: CPT | Performed by: PATHOLOGY

## 2018-03-05 PROCEDURE — 99203 OFFICE O/P NEW LOW 30 MIN: CPT | Mod: 25,S$GLB,, | Performed by: DERMATOLOGY

## 2018-03-05 PROCEDURE — 17000 DESTRUCT PREMALG LESION: CPT | Mod: S$GLB,,, | Performed by: DERMATOLOGY

## 2018-03-05 RX ORDER — HYDROCORTISONE 25 MG/G
CREAM TOPICAL 2 TIMES DAILY
Qty: 30 G | Refills: 0 | Status: SHIPPED | OUTPATIENT
Start: 2018-03-05 | End: 2018-06-06

## 2018-03-05 RX ORDER — TRIAMCINOLONE ACETONIDE 1 MG/G
CREAM TOPICAL 2 TIMES DAILY
Qty: 30 G | Refills: 0 | Status: SHIPPED | OUTPATIENT
Start: 2018-03-05 | End: 2018-06-06

## 2018-03-05 NOTE — PROGRESS NOTES
Subjective:       Patient ID:  Merrill Cummings is a 63 y.o. male who presents for   Chief Complaint   Patient presents with    Rash     Patient here for initial visit c/o rash on bilateral lower extremities for 4 days, feels rough with no itch, not treated  Spots on face present for many years, burn when he sweats, has used Gold Bond cream- had not helped   Several moles on his back present for many years, asymptomatic  Dark lesion on right foot present for most of his life, asymptomatic, not treated  Lesion on right arm for many years,recently getting larger, not treated  Lesion on left eyebrow present for several years, asymptomatic, not treated  Rough patch under right eye for 2 years, using Gold Bond cream- has not helped   No phx of skin cancer. No fhx of skin cancer    Past Medical History:  Anticoagulant long-term use  3-4yrs: Diabetes mellitus      Comment: Borderline  02/01/2018: H. pylori infection      Comment: had EGD, undergoing treatment   HEARING LOSS  Hyperlipidemia   Hypertension  Kidney stone  2012: Muscular dystrophy      Comment: diagnosed neurology [facility] -wears leg braces   Urolithiasis          Review of Systems   Skin: Positive for rash, dry skin and wears hat. Negative for daily sunscreen use.   Hematologic/Lymphatic: Bruises/bleeds easily (on ASA).        Objective:    Physical Exam   Constitutional: He appears well-developed and well-nourished.   Neurological: He is alert.   Psychiatric: He has a normal mood and affect.   Skin:   Areas Examined (abnormalities noted in diagram):   Scalp / Hair Palpated and Inspected  Head / Face Inspection Performed  Neck Inspection Performed  Chest / Axilla Inspection Performed  Abdomen Inspection Performed  Back Inspection Performed  RUE Inspected  LUE Inspection Performed  RLE Inspected  LLE Inspection Performed                               Diagram Legend     Erythematous scaling macule/papule c/w actinic keratosis       Vascular papule c/w angioma       Pigmented verrucoid papule/plaque c/w seborrheic keratosis      Yellow umbilicated papule c/w sebaceous hyperplasia      Irregularly shaped tan macule c/w lentigo     1-2 mm smooth white papules consistent with Milia      Movable subcutaneous cyst with punctum c/w epidermal inclusion cyst      Subcutaneous movable cyst c/w pilar cyst      Firm pink to brown papule c/w dermatofibroma      Pedunculated fleshy papule(s) c/w skin tag(s)      Evenly pigmented macule c/w junctional nevus     Mildly variegated pigmented, slightly irregular-bordered macule c/w mildly atypical nevus      Flesh colored to evenly pigmented papule c/w intradermal nevus       Pink pearly papule/plaque c/w basal cell carcinoma      Erythematous hyperkeratotic cursted plaque c/w SCC      Surgical scar with no sign of skin cancer recurrence      Open and closed comedones      Inflammatory papules and pustules      Verrucoid papule consistent consistent with wart     Erythematous eczematous patches and plaques     Dystrophic onycholytic nail with subungual debris c/w onychomycosis     Umbilicated papule    Erythematous-base heme-crusted tan verrucoid plaque consistent with inflamed seborrheic keratosis     Erythematous Silvery Scaling Plaque c/w Psoriasis     See annotation      Assessment / Plan:      Pathology Orders:     Normal Orders This Visit    Tissue Specimen To Pathology, Dermatology     Questions:    Directional Terms:  Other(comment)    Clinical information:  r/o prurigo vs NMSC vs other    Specific Site:  R upper arm        Neoplasm of uncertain behavior of skin  -     Tissue Specimen To Pathology, Dermatology  Shave biopsy procedure note:    Shave biopsy performed after verbal consent including risk of infection, scar, recurrence, need for additional treatment of site. Area prepped with alcohol, anesthetized with approximately 1.0cc of 1% lidocaine with epinephrine. Lesional tissue shaved with razor blade. Hemostasis achieved with  application of aluminum chloride followed by hyfrecation. No complications. Dressing applied. Wound care explained.    Rash and nonspecific skin eruption-atopic derm vs contact dermatitis vs other  -     triamcinolone acetonide 0.1% (KENALOG) 0.1 % cream; Apply topically 2 (two) times daily. legs  Dispense: 30 g; Refill: 0    Seborrheic dermatitis  -     hydrocortisone 2.5 % cream; Apply topically 2 (two) times daily. As needed to rough scaly spots on face  Dispense: 30 g; Refill: 0    Multiple benign nevi  Reassurance that his nevi appear benign with regular and consistent pigment pattern on dermoscopy    Sebaceous hyperplasia  This is a common condition representing benign enlargement of the sebaceous lobule. It typically occurs in adulthood. Reassurance given to patient.     Actinic keratosis  Cryosurgery Procedure Note    Verbal consent from the patient is obtained and the patient is aware of the precancerous quality and need for treatment of these lesions. Liquid nitrogen cryosurgery is applied to the 2 actinic keratoses, as detailed in the physical exam, to produce a freeze injury. The patient is aware that blisters may form and is instructed on wound care with gentle cleansing and use of vaseline ointment to keep moist until healed. The patient is supplied a handout on cryosurgery and is instructed to call if lesions do not completely resolve.    Lentigines  These are benign hyperpigmented sun induced lesions. Daily sun protection will reduce the number of new lesions      Seborrheic keratoses  These are benign inherited growths without a malignant potential. Reassurance given to patient. No treatment is necessary           Follow-up in about 2 months (around 5/5/2018) for pending Pathology.

## 2018-03-06 LAB — H PYLORI AG STL QL: NOT DETECTED

## 2018-03-08 ENCOUNTER — TELEPHONE (OUTPATIENT)
Dept: GASTROENTEROLOGY | Facility: CLINIC | Age: 63
End: 2018-03-08

## 2018-03-08 NOTE — TELEPHONE ENCOUNTER
----- Message from Caron Menjivar sent at 3/8/2018  8:34 AM CST -----  Contact: Pt  Pt calling in regards to wanting to speak with the Dr or nurse concerning he is dealing with acid reflux.    Pt can be reached at [phone] (hnwt)

## 2018-03-08 NOTE — TELEPHONE ENCOUNTER
----- Message from Shanice Cuevas sent at 3/8/2018  9:11 AM CST -----  returned omayra's call....[phone]

## 2018-03-08 NOTE — TELEPHONE ENCOUNTER
He should continue the pantoprazole just once a day instead of two times per day. He can add Zantac 150mg twice a day as needed. If this does not work, I would like to see him in clinic.

## 2018-03-08 NOTE — TELEPHONE ENCOUNTER
Spoke with patient and he is having some reflux issues.  He states you cut the protonix in half to once a day.  Would like to know what you suggest?

## 2018-03-19 ENCOUNTER — PATIENT MESSAGE (OUTPATIENT)
Dept: DERMATOLOGY | Facility: CLINIC | Age: 63
End: 2018-03-19

## 2018-04-03 ENCOUNTER — LAB VISIT (OUTPATIENT)
Dept: LAB | Facility: HOSPITAL | Age: 63
End: 2018-04-03
Attending: UROLOGY
Payer: COMMERCIAL

## 2018-04-03 ENCOUNTER — OFFICE VISIT (OUTPATIENT)
Dept: UROLOGY | Facility: CLINIC | Age: 63
End: 2018-04-03
Payer: COMMERCIAL

## 2018-04-03 VITALS
HEART RATE: 80 BPM | WEIGHT: 192.63 LBS | DIASTOLIC BLOOD PRESSURE: 90 MMHG | BODY MASS INDEX: 27.58 KG/M2 | SYSTOLIC BLOOD PRESSURE: 142 MMHG | HEIGHT: 70 IN

## 2018-04-03 DIAGNOSIS — R97.20 ELEVATED PSA: Primary | ICD-10-CM

## 2018-04-03 DIAGNOSIS — R97.20 ELEVATED PSA: ICD-10-CM

## 2018-04-03 DIAGNOSIS — N40.1 BENIGN PROSTATIC HYPERPLASIA WITH URINARY OBSTRUCTION: ICD-10-CM

## 2018-04-03 DIAGNOSIS — N13.8 BENIGN PROSTATIC HYPERPLASIA WITH URINARY OBSTRUCTION: ICD-10-CM

## 2018-04-03 DIAGNOSIS — R35.1 NOCTURIA: ICD-10-CM

## 2018-04-03 LAB
BILIRUB SERPL-MCNC: NORMAL MG/DL
BLOOD URINE, POC: NORMAL
COLOR, POC UA: YELLOW
COMPLEXED PSA SERPL-MCNC: 12 NG/ML
GLUCOSE UR QL STRIP: NORMAL
KETONES UR QL STRIP: NORMAL
LEUKOCYTE ESTERASE URINE, POC: NORMAL
NITRITE, POC UA: NORMAL
PH, POC UA: 7
PROTEIN, POC: NORMAL
SPECIFIC GRAVITY, POC UA: 1.02
UROBILINOGEN, POC UA: NORMAL

## 2018-04-03 PROCEDURE — 81002 URINALYSIS NONAUTO W/O SCOPE: CPT | Mod: S$GLB,,, | Performed by: UROLOGY

## 2018-04-03 PROCEDURE — 36415 COLL VENOUS BLD VENIPUNCTURE: CPT | Mod: PO

## 2018-04-03 PROCEDURE — 99214 OFFICE O/P EST MOD 30 MIN: CPT | Mod: 25,S$GLB,, | Performed by: UROLOGY

## 2018-04-03 PROCEDURE — 84153 ASSAY OF PSA TOTAL: CPT

## 2018-04-03 PROCEDURE — 99999 PR PBB SHADOW E&M-EST. PATIENT-LVL III: CPT | Mod: PBBFAC,,, | Performed by: UROLOGY

## 2018-04-03 NOTE — PROGRESS NOTES
UROLOGY [place]  4 3 18    c-c bph, elevated psa    Age 63, has had a number of medical issues lately. Had acid reflux and found to have h pilorii in the stomach, for which he was treated. Also had heart issues,and needed dose adjustment of his medication. He then noticed that he was burping all the time, and found that the oxybutynin that we had given him was responsible for those symptoms. He stopped the oxybutynin and he stopped having the burping problem.    He was also told to lose weight, and he is trying to do that.   He exercises a lot and cycles 5 miles every day    He drinks a lot of water daily, calculates that he drinks one gallon a day    His bowel movements are twice a week (all his life)    We mostly follow him for psa elevation, which was 9.1 in oct 2017. Had a prostate biopsy in apr 2017, negative.     Pt has a hx of renal cyst and kidney stones        PAST MEDICAL HISTORY:     SURGICAL: Ankle fracture surgery, extracorporeal shock wave lithotripsy x2,   2002 and 2004. Circumcision.     MEDICAL: Diabetes, arterial hypertension, hyperlipidemia, urolithiasis,   muscular dystrophy diagnosed in 2012 by the [facility] at [facility]   in the [facility]. The patient was leg braces to improve his walking.     FAMILIAL MEDICAL HISTORY: Father had prostate cancer, but did not die of that.   He also had lung cancer and did die of that.     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                 Current Outpatient Prescriptions on File Prior to Visit   Medication Sig Dispense Refill    aspirin 81 MG chewable tablet Take 81 mg by mout        co-enzyme Q-10 30 mg capsule Take 30 mg by m        hydrochlorothiazide (MICROZIDE) 12.5 mg capsule Take 1        metformin (GLUCOPHAGE) 500 MG tablet Take by mouth 3        ONETOUCH DELICA LANC          simvastatin (ZOCOR) 40 MG tablet Take 1 table mo        allopurinol  (ZYLOPRIM Take 1  30 tablet 5    levocetirizine (XYZAL) 5 MG tablet Take 1 tablet (5 10 tablet 0   ROS:  Denies malaise, headaches, eye symptoms, difficulty swallowing or breathing problems.   No chest pains or palpitations.   No change in bowel habits, no tarry stools or hematochezia. No acid reflux.  No genital lesions.  No bleeding disorders, no seizures.  Psych: normal mentation, normal affect        ALLERGIES: NEXIUM.        PHYSICAL EXAMINATION:  Pt alert, oriented, no distress  HEENT: wnl.  Neck: supple, no JVD, no lymphadenopathy  Chest: CV NSR  Lungs: normal chest expansion  ABDOMEN: Flat and nontender. No masses. No surgical scars. No hernias detected.  GENITOURINARY: Penis circumcised. Meatus normal. Testes normal.  JULY: Shows 40 g prostate without indurations or nodules.  Extremities: no edema, peripheral pulses nl  Neuro: preserved        IMPRESSION:   Elevated psa  bph on observation  Renal cyst  Kidney stones  Family hx of prost cancer  Overactive bladder     I recommend doing a MDX test on the negative tissue from his last prostate biopsy, as it will help us decide if we need to repeat the test.

## 2018-04-04 ENCOUNTER — TELEPHONE (OUTPATIENT)
Dept: NEUROLOGY | Facility: CLINIC | Age: 63
End: 2018-04-04

## 2018-04-04 NOTE — TELEPHONE ENCOUNTER
----- Message from Cherrie Chau sent at 4/4/2018 10:15 AM CDT -----  Contact: pt @ [phone]  Asking if Dr. Tenorio is the correct choice for the patients needs? Says that his issues maybe muscular neurological , also says he received a call telling him that he needs to reschedule and whatever dates that were mentioned would be fine, but his concern is whether Dr. Tenorio will be able to help him. Please call.

## 2018-04-05 DIAGNOSIS — Z79.2 PROPHYLACTIC ANTIBIOTIC: Primary | ICD-10-CM

## 2018-04-05 DIAGNOSIS — R97.20 ELEVATED PSA: Primary | ICD-10-CM

## 2018-04-05 RX ORDER — CIPROFLOXACIN 500 MG/1
500 TABLET ORAL EVERY 12 HOURS
Qty: 6 TABLET | Refills: 0 | Status: SHIPPED | OUTPATIENT
Start: 2018-04-08 | End: 2018-04-11

## 2018-04-05 NOTE — TELEPHONE ENCOUNTER
----- Message from Valerie Mckinney MA sent at 4/5/2018 10:29 AM CDT -----  Patient informed of PSA results. He was assured that a nurse will call to schedule the biopsy. He would also like a halo test ordered with the tissue.

## 2018-04-05 NOTE — TELEPHONE ENCOUNTER
Spoke with patient: scheduled Prostate Biopsy on 4/9/18.  Reviewed instructions with patient & wife. Cipro sent to MD to e prescribe to pharmacy

## 2018-04-06 ENCOUNTER — TELEPHONE (OUTPATIENT)
Dept: UROLOGY | Facility: CLINIC | Age: 63
End: 2018-04-06

## 2018-04-06 ENCOUNTER — ANESTHESIA EVENT (OUTPATIENT)
Dept: SURGERY | Facility: HOSPITAL | Age: 63
End: 2018-04-06
Payer: COMMERCIAL

## 2018-04-06 NOTE — TELEPHONE ENCOUNTER
----- Message from Sangita Carrizales sent at 4/6/2018  8:37 AM CDT -----  Good Morning,     This pt's authorization for Monday is pending, is the procedure medically urgent ?

## 2018-04-09 ENCOUNTER — SURGERY (OUTPATIENT)
Age: 63
End: 2018-04-09

## 2018-04-09 ENCOUNTER — ANESTHESIA (OUTPATIENT)
Dept: SURGERY | Facility: HOSPITAL | Age: 63
End: 2018-04-09
Payer: COMMERCIAL

## 2018-04-09 ENCOUNTER — HOSPITAL ENCOUNTER (OUTPATIENT)
Facility: HOSPITAL | Age: 63
Discharge: HOME | End: 2018-04-09
Attending: UROLOGY | Admitting: UROLOGY
Payer: COMMERCIAL

## 2018-04-09 DIAGNOSIS — R97.20 ELEVATED PSA: Primary | ICD-10-CM

## 2018-04-09 LAB — GLUCOSE SERPL-MCNC: 106 MG/DL (ref 70–110)

## 2018-04-09 PROCEDURE — 37000008 HC ANESTHESIA 1ST 15 MINUTES: Mod: PO | Performed by: UROLOGY

## 2018-04-09 PROCEDURE — 37000009 HC ANESTHESIA EA ADD 15 MINS: Mod: PO | Performed by: UROLOGY

## 2018-04-09 PROCEDURE — D9220A PRA ANESTHESIA: Mod: CRNA,,, | Performed by: NURSE ANESTHETIST, CERTIFIED REGISTERED

## 2018-04-09 PROCEDURE — 76872 US TRANSRECTAL: CPT | Mod: 26,,, | Performed by: UROLOGY

## 2018-04-09 PROCEDURE — 63600175 PHARM REV CODE 636 W HCPCS: Mod: PO | Performed by: NURSE ANESTHETIST, CERTIFIED REGISTERED

## 2018-04-09 PROCEDURE — 82962 GLUCOSE BLOOD TEST: CPT | Mod: PO | Performed by: UROLOGY

## 2018-04-09 PROCEDURE — 71000033 HC RECOVERY, INTIAL HOUR: Mod: PO | Performed by: UROLOGY

## 2018-04-09 PROCEDURE — 76942 ECHO GUIDE FOR BIOPSY: CPT | Mod: 26,59,, | Performed by: UROLOGY

## 2018-04-09 PROCEDURE — 36000705 HC OR TIME LEV I EA ADD 15 MIN: Mod: PO | Performed by: UROLOGY

## 2018-04-09 PROCEDURE — 55700 PR BIOPSY OF PROSTATE,NEEDLE/PUNCH: CPT | Mod: ,,, | Performed by: UROLOGY

## 2018-04-09 PROCEDURE — 36000704 HC OR TIME LEV I 1ST 15 MIN: Mod: PO | Performed by: UROLOGY

## 2018-04-09 PROCEDURE — D9220A PRA ANESTHESIA: Mod: ANES,,, | Performed by: ANESTHESIOLOGY

## 2018-04-09 RX ORDER — SODIUM CHLORIDE 0.9 % (FLUSH) 0.9 %
3 SYRINGE (ML) INJECTION
Status: DISCONTINUED | OUTPATIENT
Start: 2018-04-09 | End: 2018-04-09 | Stop reason: HOSPADM

## 2018-04-09 RX ORDER — PROPOFOL 10 MG/ML
VIAL (ML) INTRAVENOUS CONTINUOUS PRN
Status: DISCONTINUED | OUTPATIENT
Start: 2018-04-09 | End: 2018-04-09

## 2018-04-09 RX ORDER — LIDOCAINE HYDROCHLORIDE 10 MG/ML
1 INJECTION, SOLUTION EPIDURAL; INFILTRATION; INTRACAUDAL; PERINEURAL ONCE
Status: DISCONTINUED | OUTPATIENT
Start: 2018-04-09 | End: 2018-04-09 | Stop reason: HOSPADM

## 2018-04-09 RX ORDER — LIDOCAINE HCL/PF 100 MG/5ML
SYRINGE (ML) INTRAVENOUS
Status: DISCONTINUED | OUTPATIENT
Start: 2018-04-09 | End: 2018-04-09

## 2018-04-09 RX ORDER — FENTANYL CITRATE 50 UG/ML
25 INJECTION, SOLUTION INTRAMUSCULAR; INTRAVENOUS EVERY 5 MIN PRN
Status: DISCONTINUED | OUTPATIENT
Start: 2018-04-09 | End: 2018-04-09 | Stop reason: HOSPADM

## 2018-04-09 RX ORDER — ONDANSETRON 2 MG/ML
4 INJECTION INTRAMUSCULAR; INTRAVENOUS ONCE AS NEEDED
Status: DISCONTINUED | OUTPATIENT
Start: 2018-04-09 | End: 2018-04-09 | Stop reason: HOSPADM

## 2018-04-09 RX ORDER — OXYCODONE HYDROCHLORIDE 5 MG/1
5 TABLET ORAL ONCE AS NEEDED
Status: DISCONTINUED | OUTPATIENT
Start: 2018-04-09 | End: 2018-04-09 | Stop reason: HOSPADM

## 2018-04-09 RX ORDER — ONDANSETRON 8 MG/1
8 TABLET, ORALLY DISINTEGRATING ORAL EVERY 8 HOURS PRN
Status: DISCONTINUED | OUTPATIENT
Start: 2018-04-09 | End: 2018-04-09 | Stop reason: HOSPADM

## 2018-04-09 RX ORDER — MIDAZOLAM HYDROCHLORIDE 1 MG/ML
INJECTION, SOLUTION INTRAMUSCULAR; INTRAVENOUS
Status: DISCONTINUED | OUTPATIENT
Start: 2018-04-09 | End: 2018-04-09

## 2018-04-09 RX ORDER — ACETAMINOPHEN 325 MG/1
325 TABLET ORAL EVERY 4 HOURS PRN
Status: DISCONTINUED | OUTPATIENT
Start: 2018-04-09 | End: 2018-04-09 | Stop reason: HOSPADM

## 2018-04-09 RX ORDER — FENTANYL CITRATE 50 UG/ML
INJECTION, SOLUTION INTRAMUSCULAR; INTRAVENOUS
Status: DISCONTINUED | OUTPATIENT
Start: 2018-04-09 | End: 2018-04-09

## 2018-04-09 RX ORDER — SODIUM CHLORIDE, SODIUM LACTATE, POTASSIUM CHLORIDE, CALCIUM CHLORIDE 600; 310; 30; 20 MG/100ML; MG/100ML; MG/100ML; MG/100ML
INJECTION, SOLUTION INTRAVENOUS CONTINUOUS
Status: DISCONTINUED | OUTPATIENT
Start: 2018-04-09 | End: 2018-04-09 | Stop reason: HOSPADM

## 2018-04-09 RX ORDER — HYDROCODONE BITARTRATE AND ACETAMINOPHEN 5; 325 MG/1; MG/1
1 TABLET ORAL EVERY 4 HOURS PRN
Status: DISCONTINUED | OUTPATIENT
Start: 2018-04-09 | End: 2018-04-09 | Stop reason: HOSPADM

## 2018-04-09 RX ORDER — PROPOFOL 10 MG/ML
VIAL (ML) INTRAVENOUS
Status: DISCONTINUED | OUTPATIENT
Start: 2018-04-09 | End: 2018-04-09

## 2018-04-09 RX ORDER — ONDANSETRON 2 MG/ML
INJECTION INTRAMUSCULAR; INTRAVENOUS
Status: DISCONTINUED | OUTPATIENT
Start: 2018-04-09 | End: 2018-04-09

## 2018-04-09 RX ADMIN — LIDOCAINE HYDROCHLORIDE 100 MG: 20 INJECTION PARENTERAL at 11:04

## 2018-04-09 RX ADMIN — MIDAZOLAM HYDROCHLORIDE 2 MG: 1 INJECTION, SOLUTION INTRAMUSCULAR; INTRAVENOUS at 11:04

## 2018-04-09 RX ADMIN — PROPOFOL 75 MCG/KG/MIN: 10 INJECTION, EMULSION INTRAVENOUS at 11:04

## 2018-04-09 RX ADMIN — ONDANSETRON 4 MG: 2 INJECTION, SOLUTION INTRAMUSCULAR; INTRAVENOUS at 11:04

## 2018-04-09 RX ADMIN — PROPOFOL 30 MG: 10 INJECTION, EMULSION INTRAVENOUS at 11:04

## 2018-04-09 RX ADMIN — FENTANYL CITRATE 50 MCG: 50 INJECTION, SOLUTION INTRAMUSCULAR; INTRAVENOUS at 11:04

## 2018-04-09 RX ADMIN — SODIUM CHLORIDE, SODIUM LACTATE, POTASSIUM CHLORIDE, CALCIUM CHLORIDE: 600; 310; 30; 20 INJECTION, SOLUTION INTRAVENOUS at 09:04

## 2018-04-09 RX ADMIN — PROPOFOL 20 MG: 10 INJECTION, EMULSION INTRAVENOUS at 11:04

## 2018-04-09 NOTE — DISCHARGE INSTRUCTIONS
PROSTATE BIOPSY      DOS:   Minimal activity for 24 hours.   May shower or bathe today.   Advance diet as tolerated.   Drink a lot of liquids until you see your doctor.   Expect blood in urine/stool for up to 3 weeks.   Expect blood in semen for up to 8 weeks.   Resume home medications as prescribed   Biopsy results may not be available for 10-14 days    DONT:   No driving for 24 hours or while taking narcotic pain medication   No aspirin, NSAIDS or blood thinners for 7 days   No sexual intercourse, heavy lifting, or strenuous activity for 7 days.   DO NOT TAKE ADDITIONAL TYLENOL/ACETAMINOPHEN WHILE TAKING NARCOTIC PAIN MEDICATION THAT CONTAINS TYLENOL/ACETAMINOPHEN.    CALL PHYSICIAN FOR:   Unable to urinate within 6 hours after surgery.   Excessive bleeding.    Fever>101   Persistent pain not relieved by pain medication.    Contact your physician for emergencies at [phone]       Discharge Instructions: After Your Surgery  Youve just had surgery. During surgery, you were given medicine called anesthesia to keep you relaxed and free of pain. After surgery, you may have some pain or nausea. This is common. Here are some tips for feeling better and getting well after surgery.     Stay on schedule with your medicine.   Going home  Your healthcare provider will show you how to take care of yourself when you go home. He or she will also answer your questions. Have an adult family member or friend drive you home. For the first 24 hours after your surgery:  · Do not drive or use heavy equipment.  · Do not make important decisions or sign legal papers.  · Do not drink alcohol.  · Have someone stay with you, if needed. He or she can watch for problems and help keep you safe.  Be sure to go to all follow-up visits with your healthcare provider. And rest after your surgery for as long as your healthcare provider tells you to.  Coping with pain  If you have pain after surgery, pain medicine will help  you feel better. Take it as told, before pain becomes severe. Also, ask your healthcare provider or pharmacist about other ways to control pain. This might be with heat, ice, or relaxation. And follow any other instructions your surgeon or nurse gives you.  Tips for taking pain medicine  To get the best relief possible, remember these points:  · Pain medicines can upset your stomach. Taking them with a little food may help.  · Most pain relievers taken by mouth need at least 20 to 30 minutes to start to work.  · Taking medicine on a schedule can help you remember to take it. Try to time your medicine so that you can take it before starting an activity. This might be before you get dressed, go for a walk, or sit down for dinner.  · Constipation is a common side effect of pain medicines. Call your healthcare provider before taking any medicines such as laxatives or stool softeners to help ease constipation. Also ask if you should skip any foods. Drinking lots of fluids and eating foods such as fruits and vegetables that are high in fiber can also help. Remember, do not take laxatives unless your surgeon has prescribed them.  · Drinking alcohol and taking pain medicine can cause dizziness and slow your breathing. It can even be deadly. Do not drink alcohol while taking pain medicine.  · Pain medicine can make you react more slowly to things. Do not drive or run machinery while taking pain medicine.  Your healthcare provider may tell you to take acetaminophen to help ease your pain. Ask him or her how much you are supposed to take each day. Acetaminophen or other pain relievers may interact with your prescription medicines or other over-the-counter (OTC) medicines. Some prescription medicines have acetaminophen and other ingredients. Using both prescription and OTC acetaminophen for pain can cause you to overdose. Read the labels on your OTC medicines with care. This will help you to clearly know the list of  ingredients, how much to take, and any warnings. It may also help you not take too much acetaminophen. If you have questions or do not understand the information, ask your pharmacist or healthcare provider to explain it to you before you take the OTC medicine.  Managing nausea  Some people have an upset stomach after surgery. This is often because of anesthesia, pain, or pain medicine, or the stress of surgery. These tips will help you handle nausea and eat healthy foods as you get better. If you were on a special food plan before surgery, ask your healthcare provider if you should follow it while you get better. These tips may help:  · Do not push yourself to eat. Your body will tell you when to eat and how much.  · Start off with clear liquids and soup. They are easier to digest.  · Next try semi-solid foods, such as mashed potatoes, applesauce, and gelatin, as you feel ready.  · Slowly move to solid foods. Dont eat fatty, rich, or spicy foods at first.  · Do not force yourself to have 3 large meals a day. Instead eat smaller amounts more often.  · Take pain medicines with a small amount of solid food, such as crackers or toast, to avoid nausea.     Call your surgeon if  · You still have pain an hour after taking medicine. The medicine may not be strong enough.  · You feel too sleepy, dizzy, or groggy. The medicine may be too strong.  · You have side effects like nausea, vomiting, or skin changes, such as rash, itching, or hives.       If you have obstructive sleep apnea  You were given anesthesia medicine during surgery to keep you comfortable and free of pain. After surgery, you may have more apnea spells because of this medicine and other medicines you were given. The spells may last longer than usual.   At home:  · Keep using the continuous positive airway pressure (CPAP) device when you sleep. Unless your healthcare provider tells you not to, use it when you sleep, day or night. CPAP is a common device used  to treat obstructive sleep apnea.  · Talk with your provider before taking any pain medicine, muscle relaxants, or sedatives. Your provider will tell you about the possible dangers of taking these medicines.  Date Last Reviewed: 12/1/2016 © 2000-2017 [employer]. [address] [place] [address]. All rights reserved. This information is not intended as a substitute for professional medical care. Always follow your healthcare professional's instructions.

## 2018-04-09 NOTE — ANESTHESIA PREPROCEDURE EVALUATION
04/09/2018  Merrill Cummings is a 63 y.o., male.    Anesthesia Evaluation    I have reviewed the Patient Summary Reports.    I have reviewed the Nursing Notes.      Review of Systems  Anesthesia Hx:  No problems with previous Anesthesia    Cardiovascular:   Hypertension, well controlled    Hepatic/GI:   GERD, well controlled    Endocrine:   Diabetes, well controlled, type 2        Physical Exam  General:  Well nourished                 Anesthesia Plan  Type of Anesthesia, risks & benefits discussed:  Anesthesia Type:  general  Patient's Preference:   Intra-op Monitoring Plan:   Intra-op Monitoring Plan Comments:   Post Op Pain Control Plan:   Post Op Pain Control Plan Comments:   Induction:   IV  Beta Blocker:  Patient is not currently on a Beta-Blocker (No further documentation required).       Informed Consent: Patient understands risks and agrees with Anesthesia plan.  Questions answered. Anesthesia consent signed with patient.  ASA Score: 2     Day of Surgery Review of History & Physical:    H&P update referred to the surgeon.         Ready For Surgery From Anesthesia Perspective.

## 2018-04-09 NOTE — PLAN OF CARE
Patient tolerating oral liquids without difficulty. No apparent s&s of distress noted at this time, no complaints voiced at this time. Pt voided without difficulty. Discharge instructions reviewed with patient/family/friend with good verbal feedback received. Patient ready for discharge

## 2018-04-09 NOTE — ANESTHESIA POSTPROCEDURE EVALUATION
"Anesthesia Post Evaluation    Patient: Merrill Cummings    Procedure(s) Performed: Procedure(s) (LRB):  TRANSRECTAL ULTRASOUND GUIDED PROSTATE BIOPSY (N/A)    Final Anesthesia Type: general  Patient location during evaluation: PACU  Patient participation: Yes- Able to Participate  Level of consciousness: awake and alert  Post-procedure vital signs: reviewed and stable  Pain management: adequate  Airway patency: patent  PONV status at discharge: No PONV  Anesthetic complications: no      Cardiovascular status: blood pressure returned to baseline and hemodynamically stable  Respiratory status: unassisted  Hydration status: euvolemic  Follow-up not needed.        Visit Vitals  /83   Pulse 66   Temp 36.5 °C (97.7 °F) (Skin)   Resp 20   Ht 5' 10" (1.778 m)   Wt 83.5 kg (184 lb)   SpO2 96%   BMI 26.40 kg/m²       Pain/Cali Score: Pain Assessment Performed: Yes (4/9/2018 11:51 AM)  Presence of Pain: denies (4/9/2018 11:58 AM)  Cali Score: 10 (4/9/2018 11:58 AM)      "

## 2018-04-09 NOTE — H&P
asc PRAMOD  Elective procedure  Urology 4 9 18     c-c bph, elevated psa     Age 63, being followed for psa elevation, which was 9.1 in oct 2017 and went to 12 last week. Had a prostate biopsy in apr 2017, negative.     Pt has a hx of renal cyst and kidney stones        PAST MEDICAL HISTORY:     SURGICAL: Ankle fracture surgery, extracorporeal shock wave lithotripsy x2,   2002 and 2004. Circumcision.     MEDICAL: Diabetes, arterial hypertension, hyperlipidemia, urolithiasis,   muscular dystrophy diagnosed in 2012 by the Neurology Service at [facility]   in the [facility]. The patient was leg braces to improve his walking.     FAMILIAL MEDICAL HISTORY: Father had prostate cancer, but did not die of that.   He also had lung cancer and did die of that.     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                 Current Outpatient Prescriptions on File Prior to Visit   Medication Sig Dispense Refill    aspirin 81 MG chewable tablet Davidson        co-enzyme Q-10 30 mg capsule Take 30 mg by         hydrochlorothiazide (MICROZIDE) 12.5 mg capsule Take 1        metformin (GLUCOPHAGE) 500 Take by mouth         ONETOUCH DELICA LANC          simvastatin (ZOCOR) 40 MG tablet Take 1 ta        allopurinol (ZYLOPRIM Take 1  30 tablet 5    levocetirizine (XYZAL) 5 MG tablet Take 1 tablet (5 10 tablet 0   ROS:  Denies malaise, headaches, eye symptoms, difficulty swallowing or breathing problems.   No chest pains or palpitations.   No change in bowel habits, no tarry stools or hematochezia. No acid reflux.  No genital lesions.  No bleeding disorders, no seizures.  Psych: normal mentation, normal affect        ALLERGIES: NEXIUM.        PHYSICAL EXAMINATION:  Pt alert, oriented, no distress  HEENT: wnl.  Neck: supple, no JVD, no lymphadenopathy  Chest: CV NSR  Lungs: normal chest expansion  ABDOMEN: Flat and nontender. No masses. No  surgical scars. No hernias detected.  GENITOURINARY: Penis circumcised. Meatus normal. Testes normal.  JULY: Shows 40 g prostate without indurations or nodules.  Extremities: no edema, peripheral pulses nl  Neuro: preserved        IMPRESSION:   Elevated psa  bph on observation  Renal cyst  Kidney stones  Family hx of prost cancer  Overactive bladder     Will proceed with trusp c bx

## 2018-04-09 NOTE — TRANSFER OF CARE
"Anesthesia Transfer of Care Note    Patient: Merrill Cummings    Procedure(s) Performed: Procedure(s) (LRB):  TRANSRECTAL ULTRASOUND GUIDED PROSTATE BIOPSY (N/A)    Patient location: PACU    Anesthesia Type: MAC    Transport from OR: Transported from OR on room air with adequate spontaneous ventilation    Post pain: adequate analgesia    Post assessment: no apparent anesthetic complications    Post vital signs: stable    Level of consciousness: responds to stimulation and sedated    Nausea/Vomiting: no nausea/vomiting    Complications: none    Transfer of care protocol was followed      Last vitals:   Visit Vitals  /67   Pulse 71   Temp 36.5 °C (97.7 °F) (Skin)   Resp 12   Ht 5' 10" (1.778 m)   Wt 83.5 kg (184 lb)   SpO2 95%   BMI 26.40 kg/m²     "

## 2018-04-10 ENCOUNTER — TELEPHONE (OUTPATIENT)
Dept: UROLOGY | Facility: CLINIC | Age: 63
End: 2018-04-10

## 2018-04-10 VITALS
RESPIRATION RATE: 20 BRPM | HEIGHT: 70 IN | WEIGHT: 184 LBS | BODY MASS INDEX: 26.34 KG/M2 | SYSTOLIC BLOOD PRESSURE: 131 MMHG | HEART RATE: 66 BPM | TEMPERATURE: 98 F | OXYGEN SATURATION: 96 % | DIASTOLIC BLOOD PRESSURE: 83 MMHG

## 2018-04-10 PROCEDURE — 88305 TISSUE EXAM BY PATHOLOGIST: CPT | Performed by: PATHOLOGY

## 2018-04-10 PROCEDURE — 88305 TISSUE EXAM BY PATHOLOGIST: CPT | Mod: 26,,, | Performed by: PATHOLOGY

## 2018-04-10 NOTE — TELEPHONE ENCOUNTER
S/P Prostate Biopsy:  Patient is doing very well, no pain, NO fever or any issue.  Does have a little blood at the start of his stream but no discomfort.

## 2018-06-06 ENCOUNTER — OFFICE VISIT (OUTPATIENT)
Dept: NEUROLOGY | Facility: CLINIC | Age: 63
End: 2018-06-06
Payer: COMMERCIAL

## 2018-06-06 VITALS
SYSTOLIC BLOOD PRESSURE: 132 MMHG | BODY MASS INDEX: 27.46 KG/M2 | WEIGHT: 191.81 LBS | HEART RATE: 70 BPM | HEIGHT: 70 IN | DIASTOLIC BLOOD PRESSURE: 72 MMHG

## 2018-06-06 DIAGNOSIS — M75.102 ROTATOR CUFF SYNDROME OF LEFT SHOULDER: ICD-10-CM

## 2018-06-06 DIAGNOSIS — G25.0 ESSENTIAL TREMOR: ICD-10-CM

## 2018-06-06 DIAGNOSIS — G60.0 CHARCOT MARIE TOOTH MUSCULAR ATROPHY: Primary | ICD-10-CM

## 2018-06-06 PROCEDURE — 99999 PR PBB SHADOW E&M-EST. PATIENT-LVL III: CPT | Mod: PBBFAC,,, | Performed by: PSYCHIATRY & NEUROLOGY

## 2018-06-06 PROCEDURE — 99204 OFFICE O/P NEW MOD 45 MIN: CPT | Mod: S$GLB,,, | Performed by: PSYCHIATRY & NEUROLOGY

## 2018-06-06 PROCEDURE — 3008F BODY MASS INDEX DOCD: CPT | Mod: CPTII,S$GLB,, | Performed by: PSYCHIATRY & NEUROLOGY

## 2018-06-06 NOTE — PROGRESS NOTES
This is a 63-year-old left-handed patient who has been previously evaluated by Neurology for a hereditary neuromuscular disease that has been variously named as her rather Kandy sensory motor neuropathy, possible peroneal muscular atrophy, etc.  The patient indicates that he has had nerve conduction studies done as well as has had genetic testing done with a genetic counselor.    The patient continues to have symptoms of sensory loss that he describes as numbness in his toes and feet as well as bilateral foot drop.  The patient states that he has had AFOs made and that he wears them intermittently but not daily.  He finds that the AFO is affective when he is walking on flat even surfaces but is not very effective when walking in on even ground.    The patient is also bothered by the development over the past 5 years of tremor in his hands.  The tremor is noted when attempting fine coordinated movements such as handwriting, manipulating eating utensils and hand tools.  There is no tremor at rest but only with sustention and intention.  The tremor is equal between the left and right sides although it bothers him more on the left because he is left handed.  The patient states that the tremors extremely embarrassing for him when he attempts to eat in a crowded restaurant.    The patient also has had significant pain and loss of movement in and around the left shoulder.  The patient indicates that in the fall of 2015, he fell on his outstretched left arm on a stair railing with the railing a hitting him in the area of the left axilla.  Since that event, he has had significant pain in and around the left shoulder that is aggravated by attempts at internal rotation and abduction.  As an example, the patient states that it is extremely difficult for him to place is left arm in the sleeve of a shirt or coat and that his wife has to help him with that activity.    The patient and his wife are of the opinion that his  neuromuscular disease has been very slowly worsening over the past several years.  Most noticeably, has been the change in his gait which is now much slower and more difficult.  The patient states that there is no way that he could run any distance at all.  He has also noted a loss of standing is balance that is clearly evident when he is attempting to take a shower and closing his eyes in the shower.      ROS:  GENERAL: No fever, chills,  or weight loss.  SKIN: No rashes, itching or changes in color or texture of skin.  HEAD: No headaches or recent head trauma.  EYES: Visual acuity fine. No photophobia, ocular pain or diplopia.  EARS: Denies ear pain, discharge or vertigo.  NOSE: No loss of smell, no epistaxis or postnasal drip.  MOUTH & THROAT: No hoarseness or change in voice. No excessive gum bleeding.  NODES: Denies swollen glands.  CHEST: Denies KOCH, cyanosis, wheezing, cough and sputum production.  CARDIOVASCULAR: Denies chest pain, PND, orthopnea or reduced exercise tolerance.  ABDOMEN: Appetite fine. No weight loss. Denies diarrhea, abdominal pain, hematemesis or blood in stool.  URINARY: No flank pain, dysuria or hematuria.  PERIPHERAL VASCULAR: No claudication or cyanosis.  MUSCULOSKELETAL: No joint stiffness or swelling. Denies back pain.  NEUROLOGIC: No history of seizures, or unexplained loss of consciousness.    PAST HISTORY  Surgery:  ORIF ankle fracture, lithotripsy, colonoscopy, EGD, repair of facial laceration  Medical:  Diabetes mellitus, diet controlled; hyperlipidemia; essential hypertension; nephrolithiasis  Allergies: Ditropan, Nexium    FAMILY HISTORY  The patient indicates that his father  with prostatic cancer but that he had very similar symptoms to him a with gait disturbance, difficulty with fine coordinated movement of his hands and arms and loss of muscle mass in the legs. He does not know of any other family member who has this degree of muscle weakness.  His father did have  tremor and he has a son who has recently developed tremor in the hands.    SOCIAL HISTORY  The patient is  and lives with his wife.  He is a nonsmoker.    PE:   VITAL SIGNS:  Blood pressure 132/72, pulse 70, weight 87 kg, height 5 ft 10 in, BMI 27.52  APPEARANCE: Well nourished, well developed, in no acute distress.    HEAD: Normocephalic, atraumatic.  EYES: PERRL. EOMI.  Non-icteric sclerae.    EARS: TM's intact. Light reflex normal. No retraction or perforation.    NOSE: Mucosa pink. Airway clear.  MOUTH & THROAT: No tonsillar enlargement. No pharyngeal erythema or exudate. No stridor.  NECK: Supple. No bruits.  CHEST: Lungs clear to auscultation.  CARDIOVASCULAR: Regular rhythm without significant murmurs.  ABDOMEN: Bowel sounds normal. Not distended.   MUSCULOSKELETAL:  The patient has significant limitation of motion around the left shoulder including abduction as well as internal rotation.  He is unable to place the left hand behind his back without extreme pain and discomfort in the shoulder joint.  NEUROLOGIC:   Mental Status:  The patient is well oriented to person, time, place, and situation.  The patient is attentive to the environment and cooperative for the exam.  Cranial Nerves: II-XII grossly intact. Fundoscopic exam is normal.  No hemorrhage, exudate or papilledema is present. The extraocular muscles are intact in the cardinal directions of gaze.  No ptosis is present. Facial features are symmetrical.  Speech is normal in fluency, diction, and phrasing.  Tongue protrudes in the midline.    Gait and Station:  Romberg is negative.  The patient has a bilateral foot drop gait with exaggerated elevation of the leg during ambulation.  There is a definite slapping quality to the foot placement as well.  He could raise on his tip toes but could not raise the toes from the floor to heel walk.  Motor:  Bilateral foot drop is present. He has be eager if any ankle extension and does not have great toe  extension on either side.  In the upper extremities, there is significant weakness of internal rotation of the left humerus as well as abduction due to complaints of pain with movement and actual limitation of motion.  There is is tenderness to palpation over the left acromioclavicular joint.  Sensory:  The patient reports perception of vibratory sensation at the ankle, knees, and in both hands. Utilizing pinprick, the patient has scattered areas of pinprick  perception loss which seems to be most noticeable to the patient over the lateral side of the lower leg bilaterally and across the dorsum of the ankle.  He has perception intact of pinprick over the posterior calf bilaterally.  Cerebellar:  Intention tremor is present on finger-to-nose.  He was unable to draw a spiral diagram.  No resting tremor is present. No cogwheel rigidity was perceive with passive range of motion at either wrist or either elbow.  Reflexes:  Stretch reflexes are 1+ both biceps and brachioradialis.  Knee reflexes are trace but ankle jerks are absent bilaterally.  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  The patient is of the opinion that this is most resembling Charcot-Mague-Tooth disease although his previous genetic profile apparently did not identify an abnormal gene sequence.  His prior nerve conduction study however did demonstrate the presence of the neuropathy.  On this occasion, he also has evidence of a left rotator cuff injury with limitation of motion of the shoulder.  He also has a clear intention tremor present bilaterally which is most noticeable on finger-to-nose.  It is felt that the tremor is most consistent with essential tremor or hereditary tremor.    RECOMMENDATIONS  1.  Schedule MRI left shoulder for rotator cuff injury  2.  Discussion was held with the patient  regarding medication treatment of tremor but he declines medication at this time.  We suggested that he could utilize the wrist weight to attempt to dampen the tremor.  The patient states that he will try this 1st but is trying to avoid medication if possible.  3.  The patient was encouraged to utilizes AFOs more frequently to prevent ankle injury.  4.  Return to Neurology as needed.  This was a 65 min visit with the patient and his wife with over 50% of the time spent counseling the patient regarding the diagnosis and treatment of his neuropathy as well as his essential tremor.  The diagnosis here is medically difficult to prove as he has previously had genetic testing done as well as nerve conduction and EMG.  This note is generated with speech recognition software and is subject to transcription error and sound alike phrases that may be missed by proofreading.

## 2018-06-13 ENCOUNTER — PATIENT MESSAGE (OUTPATIENT)
Dept: NEUROLOGY | Facility: CLINIC | Age: 63
End: 2018-06-13

## 2018-06-13 ENCOUNTER — TELEPHONE (OUTPATIENT)
Dept: NEUROLOGY | Facility: CLINIC | Age: 63
End: 2018-06-13

## 2018-06-13 RX ORDER — ALPRAZOLAM 1 MG/1
TABLET ORAL
Qty: 5 TABLET | Refills: 0 | Status: SHIPPED | OUTPATIENT
Start: 2018-06-13 | End: 2018-10-04

## 2018-06-27 ENCOUNTER — TELEPHONE (OUTPATIENT)
Dept: NEUROLOGY | Facility: CLINIC | Age: 63
End: 2018-06-27

## 2018-06-27 ENCOUNTER — HOSPITAL ENCOUNTER (OUTPATIENT)
Dept: RADIOLOGY | Facility: HOSPITAL | Age: 63
Discharge: HOME | End: 2018-06-27
Attending: PSYCHIATRY & NEUROLOGY
Payer: COMMERCIAL

## 2018-06-27 DIAGNOSIS — M75.102 ROTATOR CUFF SYNDROME OF LEFT SHOULDER: ICD-10-CM

## 2018-06-27 PROCEDURE — 73221 MRI JOINT UPR EXTREM W/O DYE: CPT | Mod: TC,LT

## 2018-06-27 NOTE — TELEPHONE ENCOUNTER
Called pt back and he wants referral to ortho as suggested by dr meza. I messaged  to make rererral.6/27/1827/18/1628/sf

## 2018-06-28 ENCOUNTER — TELEPHONE (OUTPATIENT)
Dept: NEUROLOGY | Facility: CLINIC | Age: 63
End: 2018-06-28

## 2018-06-28 DIAGNOSIS — M12.819 ROTATOR CUFF ARTHROPATHY, UNSPECIFIED LATERALITY: Primary | ICD-10-CM

## 2018-06-28 NOTE — TELEPHONE ENCOUNTER
Called to give appt to dr hartman in [place] for ortho consult. He thanked me and I gave number.6/28/18/28/18/0830/sf

## 2018-07-10 DIAGNOSIS — M25.512 LEFT SHOULDER PAIN, UNSPECIFIED CHRONICITY: Primary | ICD-10-CM

## 2018-07-11 ENCOUNTER — HOSPITAL ENCOUNTER (OUTPATIENT)
Dept: RADIOLOGY | Facility: HOSPITAL | Age: 63
Discharge: HOME | End: 2018-07-11
Attending: ORTHOPAEDIC SURGERY
Payer: COMMERCIAL

## 2018-07-11 ENCOUNTER — OFFICE VISIT (OUTPATIENT)
Dept: ORTHOPEDICS | Facility: CLINIC | Age: 63
End: 2018-07-11
Payer: COMMERCIAL

## 2018-07-11 VITALS — WEIGHT: 191 LBS | HEIGHT: 70 IN | BODY MASS INDEX: 27.35 KG/M2

## 2018-07-11 DIAGNOSIS — M75.122 COMPLETE TEAR OF LEFT ROTATOR CUFF: Primary | ICD-10-CM

## 2018-07-11 DIAGNOSIS — M25.512 LEFT SHOULDER PAIN, UNSPECIFIED CHRONICITY: ICD-10-CM

## 2018-07-11 PROCEDURE — 73030 X-RAY EXAM OF SHOULDER: CPT | Mod: 26,LT,, | Performed by: RADIOLOGY

## 2018-07-11 PROCEDURE — 73030 X-RAY EXAM OF SHOULDER: CPT | Mod: TC,PO,LT

## 2018-07-11 PROCEDURE — 99999 PR PBB SHADOW E&M-EST. PATIENT-LVL II: CPT | Mod: PBBFAC,,, | Performed by: ORTHOPAEDIC SURGERY

## 2018-07-11 PROCEDURE — 99243 OFF/OP CNSLTJ NEW/EST LOW 30: CPT | Mod: S$GLB,,, | Performed by: ORTHOPAEDIC SURGERY

## 2018-07-11 NOTE — LETTER
July 11, 2018      Erick Tenorio MD  [address]  [facility] [address]           [facility]  [address] [address]  Phone: [phone]          Patient: Merrill Cummings   MR Number: [id number]   YOB: 1955   Date of Visit: 7/11/2018       Dear Dr. Erick Tenorio:    Thank you for referring Merrill Cummings to me for evaluation. Attached you will find relevant portions of my assessment and plan of care.    If you have questions, please do not hesitate to call me. I look forward to following Merrill Cummings along with you.    Sincerely,    Merrill Bradley MD    Enclosure  CC:  No Recipients    If you would like to receive this communication electronically, please contact [email or web address] or [phone] to request more information on [facility] access.    For providers and/or their staff who would like to refer a patient to [facility], please contact us through our one-stop-shop provider referral line, Loren Gurrola, at [phone].    If you feel you have received this communication in error or would no longer like to receive these types of communications, please e-mail [email or web address]

## 2018-07-11 NOTE — PROGRESS NOTES
"DATE: 7/11/2018  PATIENT: Merrill Cummings  REFERRING MD: Erick Tenorio M.D.  CHIEF COMPLAINT:   Chief Complaint   Patient presents with    Left Shoulder - Pain       HISTORY:  Merrill Cummings is a 63 y.o. right hand dominant male  who is referred by Dr. Marquez for evaluation of left shoulder pain.  He states he injured himself 3 years ago when he fell going up the steps and graft on the railing.  Since that time his had pain and weakness left shoulder.  His past history is remarkable for a neuromuscular disorder which causes generalized weakness in width.  Pain is reported at 0/10 at rest.  However does have pain with activity and has difficulty with overhead functions.      PAST MEDICAL/SURGICAL HISTORY:  Past Medical History:   Diagnosis Date    Anticoagulant long-term use     Diabetes mellitus 3-4yrs    Borderline    H. pylori infection 02/01/2018    had EGD, undergoing treatment    HEARING LOSS     Hyperlipidemia     Hypertension     Kidney stone     Muscular dystrophy 2012    diagnosed neurology OF -wears leg braces    Urolithiasis      Past Surgical History:   Procedure Laterality Date    ANKLE FRACTURE SURGERY      CIRCUMCISION      COLONOSCOPY  ~2004 or 05  ([place]?  [place]?)    "Nothing"    ESOPHAGOGASTRODUODENOSCOPY      EXTRACORPOREAL SHOCK WAVE LITHOTRIPSY  2002, 2004    x 2 - dr chivo joshi ([facility])    FACIAL LACERATIONS REPAIR      as a child    MULTIPLE TOOTH EXTRACTIONS      ORIF ankle fracture  1988       Current Medications:   Current Outpatient Prescriptions:     ALPRAZolam (XANAX) 1 MG tablet, Take 1 tab one hour before MRI.  May repeat 1 tab if needed., Disp: 5 tablet, Rfl: 0    amLODIPine (NORVASC) 5 MG tablet, Take 5 mg by mouth once daily., Disp: , Rfl: 1    blood sugar diagnostic (BLOOD GLUCOSE TEST) Strp, by Misc.(Non-Drug; Combo Route) route. One touch Ultra Blue, Disp: , Rfl:     co-enzyme Q-10 30 mg capsule, Take 100 mg by mouth once daily. Qunol 100 " mg, Disp: , Rfl:     hydrochlorothiazide (HYDRODIURIL) 25 MG tablet, Take 25 mg by mouth once daily., Disp: , Rfl: 1    losartan (COZAAR) 100 MG tablet, Take 100 mg by mouth once daily., Disp: , Rfl:     metformin (GLUCOPHAGE) 1000 MG tablet, Take 500 mg by mouth daily with breakfast. , Disp: , Rfl: 1    metoprolol succinate (TOPROL-XL) 50 MG 24 hr tablet, Take 50 mg by mouth 2 (two) times daily., Disp: , Rfl: 1    multivitamin with minerals tablet, Take 1 tablet by mouth once daily., Disp: , Rfl:     ONETOUCH DELICA LANCETS 30 gauge Misc, , Disp: , Rfl:     simvastatin (ZOCOR) 40 MG tablet, Take 1 tablet by mouth every other day. 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 Negative for unexplained weight loss.  HENT: Negative for headaches and blurry vision.   Cardiovascular: Negative for chest pain, irregular heartbeat, leg swelling and palpitations.   Respiratory: Negative for cough and shortness of breath.   Gastrointestinal: Negative for abdominal pain, heartburn, nausea and vomiting.   Genitourinary: Negative for bladder incontinence and dysuria.   Musculoskeletal: Positive for neuromuscular disorder causing generalized weakness.  Neurological: Negative for numbness.   Psychiatric/Behavioral: Negative for depression.   Endocrine: Negative for polyuria.   Hematologic/Lymphatic:  "Negative for bleeding disorders.  Skin: Negative for poor wound healing.       PHYSICAL EXAM:  Ht 5' 10" (1.778 m)   Wt 86.6 kg (191 lb)   BMI 27.41 kg/m²   Merrill Cummings is a well developed, well nourished male in no acute distress. Physical examination of the left shoulder evaluated the following:    Inspection, palpation and ROM of the cervical spine  Disc compression testing bilaterally  Inspection for swelling, ecchymosis, erythema, deformity and atrophy  Tenderness to palpation of the soft tissue and bony structures  Active and passive range of motion  Sensation of the shoulder and upper extremity  Motor strength in the deltoid, supraspinatus, internal rotators and external rotators  Impingement, apprehension, relocation and Speed's tests  Upper extremity vascular exam (skin temp,color, capillary refill)  Inspection for pseudomotor signs    Remarkable findings included:  Range of motion shoulder is 160° of forward elevation, 140° of abduction, X rotation with the arm abducted 90° is 85°.  Positive liftoff test and belly press test.  Nearly 5/5 strength in the supraspinous rotators.  No impingement sign on exam          IMAGING:   X-rays and MRI of the left shoulder are personally reviewed.  No acute fractures or dislocations.  No significant degenerative changes on plain films noted.  Small calcific density about the supraspinatus insertion identified.  MRI is personally reviewed and does show what appears to be a large subscapularis tear and supraspinatus tear.       ASSESSMENT:   Large full-thickness rotator cuff tear left shoulder    PLAN:  The nature of the diagnosis, using models and diagrams when appropriate, was explained to the patient and his wife in detail.  I went over the natural history of rotator cuff tears.  Treatment options discussed included observation, tolerating the symptoms and modify his activities versus arthroscopy left shoulder with rotator cuff repair versus superior capsule " reconstruction.  The pros and cons and risk of each were explained in detail.  All questions answered and the patient wishes to think about his options.  He'll contact the office should he wish to pursue surgical intervention.      This note was dictated using voice recognition software. Please excuse any grammatical or typographical errors.  Answers for HPI/ROS submitted by the patient on 7/10/2018   Arm pain  unexpected weight change: No  appetite change : No  sleep disturbance: No  IMMUNOCOMPROMISED: No  nervous/ anxious: No  dysphoric mood: No  rash: No  visual disturbance: No  eye redness: No  eye pain: No  ear pain: No  tinnitus: Yes  hearing loss: Yes  sinus pressure : No  nosebleeds: No  enviro allergies: No  food allergies: No  cough: No  shortness of breath: No  sweating: No  frequency: No  difficulty urinating: No  hematuria: No  chest pain: No  palpitations: Yes  nausea: No  vomiting: No  diarrhea: No  blood in stool: No  constipation: No  headaches: No  dizziness: No  numbness: Yes  seizures: No  joint swelling: No  myalgia: No  weakness: Yes  back pain: No  Pain Chronicity: recurrent  History of trauma: No  Onset: more than 1 year ago  Frequency: intermittently  Progression since onset: waxing and waning  Injury mechanism: collision/contact  injury location: at home  pain- numeric: 3/10  pain location: left shoulder  pain quality: aching  Radiating Pain: No  Aggravating factors: activity, exercise, extension, flexion  fever: No  inability to bear weight: Yes  itching: No  joint locking: No  limited range of motion: Yes  stiffness: Yes  tingling: No  Treatments tried: exercise, movement  physical therapy: ineffective  Improvement on treatment: no relief

## 2018-08-07 NOTE — TELEPHONE ENCOUNTER
Quality 265: Biopsy Follow-Up: Biopsy results reviewed, communicated, tracked, and documented S/P TRUSP: patient reports that he is doing well, NO fever or discomfort.  Very small amount of blood in urine noted today.    Quality 137: Melanoma: Continuity Of Care - Recall System: Patient information entered into a recall system that includes: target date for the next exam specified AND a process to follow up with patients regarding missed or unscheduled appointments Quality 224: Stage 0-Iic Melanoma: Overutilization Of Imaging Studies For Only Stage 0-Iic Melanoma: None of the following diagnostic imaging studies ordered: chest X-ray, CT, Ultrasound, MRI, PET, or nuclear medicine scans (ML) Detail Level: Detailed Quality 138: Melanoma: Coordination Of Care: A treatment plan was communicated to the physicians providing continuing care within one month of diagnosis outlining: diagnosis, tumor thickness and a plan for surgery or alternate care. Quality 397: Melanoma: Reporting: The pathology report includes a pT Category and statement on thickness and ulceration for pT1, mitotic rate.

## 2018-10-01 ENCOUNTER — LAB VISIT (OUTPATIENT)
Dept: LAB | Facility: HOSPITAL | Age: 63
End: 2018-10-01
Attending: UROLOGY
Payer: COMMERCIAL

## 2018-10-01 ENCOUNTER — TELEPHONE (OUTPATIENT)
Dept: UROLOGY | Facility: CLINIC | Age: 63
End: 2018-10-01

## 2018-10-01 DIAGNOSIS — C61 PROSTATE CA: Primary | ICD-10-CM

## 2018-10-01 DIAGNOSIS — C61 PROSTATE CA: ICD-10-CM

## 2018-10-01 LAB — COMPLEXED PSA SERPL-MCNC: 9.8 NG/ML

## 2018-10-01 PROCEDURE — 84153 ASSAY OF PSA TOTAL: CPT

## 2018-10-01 PROCEDURE — 36415 COLL VENOUS BLD VENIPUNCTURE: CPT | Mod: PO

## 2018-10-01 NOTE — TELEPHONE ENCOUNTER
----- Message from Cristine Strong sent at 10/1/2018 10:26 AM CDT -----  Contact: self   Placed call to pod, patient miss call from your office please call back at [phone]

## 2018-10-01 NOTE — TELEPHONE ENCOUNTER
----- Message from Kiki Anne sent at 10/1/2018  9:20 AM CDT -----  Type: Needs Medical Advice    Who Called:  Patient   Best Call Back Number: [phone]  Additional Information: patient is scheduled for an appointment on 10/02/18, he is requesting to have labs preformed prior to this appointment, please contact to advise ( no orders are available to schedule from )    Thank you

## 2018-10-03 ENCOUNTER — PATIENT MESSAGE (OUTPATIENT)
Dept: NEUROLOGY | Facility: CLINIC | Age: 63
End: 2018-10-03

## 2018-10-04 ENCOUNTER — OFFICE VISIT (OUTPATIENT)
Dept: UROLOGY | Facility: CLINIC | Age: 63
End: 2018-10-04
Payer: COMMERCIAL

## 2018-10-04 VITALS
SYSTOLIC BLOOD PRESSURE: 122 MMHG | WEIGHT: 200.19 LBS | HEART RATE: 81 BPM | DIASTOLIC BLOOD PRESSURE: 77 MMHG | BODY MASS INDEX: 28.66 KG/M2 | HEIGHT: 70 IN

## 2018-10-04 DIAGNOSIS — N40.1 BENIGN PROSTATIC HYPERPLASIA WITH URINARY OBSTRUCTION: ICD-10-CM

## 2018-10-04 DIAGNOSIS — N52.01 ERECTILE DYSFUNCTION DUE TO ARTERIAL INSUFFICIENCY: ICD-10-CM

## 2018-10-04 DIAGNOSIS — N13.8 BENIGN PROSTATIC HYPERPLASIA WITH URINARY OBSTRUCTION: ICD-10-CM

## 2018-10-04 DIAGNOSIS — R97.20 ELEVATED PSA: Primary | ICD-10-CM

## 2018-10-04 LAB
BILIRUB SERPL-MCNC: NORMAL MG/DL
BLOOD URINE, POC: NORMAL
COLOR, POC UA: YELLOW
GLUCOSE UR QL STRIP: NORMAL
KETONES UR QL STRIP: NORMAL
LEUKOCYTE ESTERASE URINE, POC: NORMAL
NITRITE, POC UA: NORMAL
PH, POC UA: 7
PROTEIN, POC: NORMAL
SPECIFIC GRAVITY, POC UA: 1.02
UROBILINOGEN, POC UA: NORMAL

## 2018-10-04 PROCEDURE — 99214 OFFICE O/P EST MOD 30 MIN: CPT | Mod: 25,S$GLB,, | Performed by: UROLOGY

## 2018-10-04 PROCEDURE — 99999 PR PBB SHADOW E&M-EST. PATIENT-LVL III: CPT | Mod: PBBFAC,,, | Performed by: UROLOGY

## 2018-10-04 PROCEDURE — 3008F BODY MASS INDEX DOCD: CPT | Mod: CPTII,S$GLB,, | Performed by: UROLOGY

## 2018-10-04 PROCEDURE — 81002 URINALYSIS NONAUTO W/O SCOPE: CPT | Mod: S$GLB,,, | Performed by: UROLOGY

## 2018-10-04 NOTE — PROGRESS NOTES
UROLOGY [place]  10 4 18    Cc elevated psa    Age 63, comes in to follow up on elevated psa. His used flaxseed tablets that a friend recommended and his follow up psa came down to 9.8 from 12 that it was 6 months ago. At the time we did a prostate biopsy and it was negative for malignancy.     Pt attributes the psa descent to the use of flaxseed tablets and asks me if I believe they could harm him.      Pt has a hx of renal cyst and kidney stones    Pt says he is having erectile dysfunction, especially maintaining the erection.        PAST MEDICAL HISTORY:     SURGICAL: Ankle fracture surgery, extracorporeal shock wave lithotripsy x2,   2002 and 2004. Circumcision.     MEDICAL: Diabetes, arterial hypertension, hyperlipidemia, urolithiasis,   muscular dystrophy diagnosed in 2012 by the Neurology Service at [facility]   in the [facility]. The patient was leg braces to improve his walking.     FAMILIAL MEDICAL HISTORY: Father had prostate cancer, but did not die of that.   He also had lung cancer and did die of that.     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 an avid bicycle rider and rides twice a day 5 miles each time for a total of 10 miles daily                 Current Outpatient Prescriptions on File Prior to Visit   Medication Sig Dispense Refill    aspirin 81 MG chewable tablet Davidson        co-enzyme Q-10 30 mg capsule Take         hydrochlorothiazide (MICROZIDE) 12.5 mg capsule Take 1        metformin (GLUCOPHAGE) 500 Take        ONETOUCH DELICA LANC          simvastatin (ZOCOR) 40 M Take 1 ta        allopurinol (ZYLOPRIM Take 1  30 tablet 5    levocetirizine (XYZAL) 5 MG tablet Take 1 10 tablet 0   ROS:  Denies malaise, headaches, eye symptoms, difficulty swallowing or breathing problems.   No chest pains or palpitations.   No change in bowel habits, no tarry stools or hematochezia. No acid reflux.  No  genital lesions.  No bleeding disorders, no seizures.  Psych: normal mentation, normal affect        ALLERGIES: NEXIUM.        PHYSICAL EXAMINATION:  Pt alert, oriented, no distress  HEENT: wnl.  Neck: supple, no JVD, no lymphadenopathy  Chest: CV NSR  Lungs: normal chest expansion  ABDOMEN: Flat and nontender. No masses. No surgical scars. No hernias detected.  GENITOURINARY: Penis circumcised. Meatus normal. Testes normal.  JULY: Shows 40 g prostate without indurations or nodules.  Extremities: no edema, peripheral pulses nl  Neuro: preserved        IMPRESSION:   Elevated psa with negative prostate biopsy in apr 2018  Will continue to monitor  It is ok for pt to take flaxseed; no problem with this or other nutritional supplement choices    bph on observation  Renal cyst  Kidney stones  Family hx of prost cancer  Erectile dysfunction, can start using generic viagra prn  RTC 6 mo will update psa then

## 2019-05-31 ENCOUNTER — OFFICE VISIT (OUTPATIENT)
Dept: FAMILY MEDICINE | Facility: CLINIC | Age: 64
End: 2019-05-31
Payer: MEDICARE

## 2019-05-31 VITALS
SYSTOLIC BLOOD PRESSURE: 121 MMHG | BODY MASS INDEX: 29.69 KG/M2 | WEIGHT: 207.38 LBS | HEART RATE: 84 BPM | DIASTOLIC BLOOD PRESSURE: 71 MMHG | HEIGHT: 70 IN | TEMPERATURE: 98 F

## 2019-05-31 DIAGNOSIS — J20.9 BRONCHITIS WITH BRONCHOSPASM: Primary | ICD-10-CM

## 2019-05-31 PROCEDURE — 99213 OFFICE O/P EST LOW 20 MIN: CPT | Mod: S$PBB,,, | Performed by: NURSE PRACTITIONER

## 2019-05-31 PROCEDURE — 99213 PR OFFICE/OUTPT VISIT, EST, LEVL III, 20-29 MIN: ICD-10-PCS | Mod: S$PBB,,, | Performed by: NURSE PRACTITIONER

## 2019-05-31 PROCEDURE — 99999 PR PBB SHADOW E&M-EST. PATIENT-LVL IV: CPT | Mod: PBBFAC,,, | Performed by: NURSE PRACTITIONER

## 2019-05-31 PROCEDURE — 99999 PR PBB SHADOW E&M-EST. PATIENT-LVL IV: ICD-10-PCS | Mod: PBBFAC,,, | Performed by: NURSE PRACTITIONER

## 2019-05-31 PROCEDURE — 96372 THER/PROPH/DIAG INJ SC/IM: CPT | Mod: PBBFAC,PO

## 2019-05-31 PROCEDURE — 99214 OFFICE O/P EST MOD 30 MIN: CPT | Mod: PBBFAC,PO | Performed by: NURSE PRACTITIONER

## 2019-05-31 RX ORDER — NEBIVOLOL HYDROCHLORIDE 20 MG/1
1 TABLET ORAL DAILY
Refills: 0 | COMMUNITY
Start: 2019-03-19 | End: 2020-10-01

## 2019-05-31 RX ORDER — METHYLPREDNISOLONE ACETATE 40 MG/ML
40 INJECTION, SUSPENSION INTRA-ARTICULAR; INTRALESIONAL; INTRAMUSCULAR; SOFT TISSUE ONCE
Status: COMPLETED | OUTPATIENT
Start: 2019-05-31 | End: 2019-05-31

## 2019-05-31 RX ORDER — PROMETHAZINE HYDROCHLORIDE AND DEXTROMETHORPHAN HYDROBROMIDE 6.25; 15 MG/5ML; MG/5ML
5 SYRUP ORAL 3 TIMES DAILY PRN
Qty: 118 ML | Refills: 0 | Status: SHIPPED | OUTPATIENT
Start: 2019-05-31 | End: 2019-06-10

## 2019-05-31 RX ORDER — SULFAMETHOXAZOLE AND TRIMETHOPRIM 800; 160 MG/1; MG/1
1 TABLET ORAL 2 TIMES DAILY
Qty: 20 TABLET | Refills: 0 | Status: SHIPPED | OUTPATIENT
Start: 2019-05-31 | End: 2019-06-10

## 2019-05-31 RX ADMIN — METHYLPREDNISOLONE ACETATE 40 MG: 40 INJECTION, SUSPENSION INTRALESIONAL; INTRAMUSCULAR; INTRASYNOVIAL; SOFT TISSUE at 10:05

## 2019-05-31 NOTE — PROGRESS NOTES
Subjective:       Patient ID: Merrill Cummings is a 64 y.o. male.    Chief Complaint: Cough and Chest Congestion    Cough   This is a new problem. The current episode started 1 to 4 weeks ago. The problem has been rapidly worsening. The problem occurs every few minutes. The cough is productive of purulent sputum. Associated symptoms include ear congestion, nasal congestion, postnasal drip, rhinorrhea, shortness of breath and wheezing. Pertinent negatives include no chest pain, ear pain, fever, headaches, myalgias, rash or sore throat. The symptoms are aggravated by lying down (activity, heat). He has tried OTC cough suppressant (dayquil) for the symptoms. The treatment provided no relief.       Review of Systems   Constitutional: Negative for fatigue, fever and unexpected weight change.   HENT: Positive for postnasal drip and rhinorrhea. Negative for ear pain and sore throat.    Eyes: Negative.  Negative for pain and visual disturbance.   Respiratory: Positive for shortness of breath and wheezing. Negative for cough.    Cardiovascular: Negative for chest pain and palpitations.   Gastrointestinal: Negative for abdominal pain, diarrhea, nausea and vomiting.   Genitourinary: Negative for dysuria and frequency.   Musculoskeletal: Negative for arthralgias and myalgias.   Skin: Negative for color change and rash.   Neurological: Negative for dizziness and headaches.   Psychiatric/Behavioral: Negative for sleep disturbance. The patient is not nervous/anxious.        Vitals:    05/31/19 0942   BP: 121/71   Pulse: 84   Temp: 98.3 °F (36.8 °C)       Objective:     Current Outpatient Medications   Medication Sig Dispense Refill    blood sugar diagnostic (BLOOD GLUCOSE TEST) Strp by Misc.(Non-Drug; Combo Route) route. One touch Ultra Blue      BYSTOLIC 20 mg Tab Take 1 tablet by mouth once daily.  0    co-enzyme Q-10 30 mg capsule Take 100 mg by mouth once daily. Qunol 100 mg      FLAXSEED ORAL Take by mouth. Flaxseed  Ground-2 tablespoons daily      hydrochlorothiazide (HYDRODIURIL) 25 MG tablet Take 25 mg by mouth once daily.  1    losartan (COZAAR) 50 MG tablet Take 50 mg by mouth once daily.       metformin (GLUCOPHAGE) 1000 MG tablet Take 500 mg by mouth daily with breakfast.   1    multivitamin with minerals tablet Take 1 tablet by mouth once daily.      ONETOUCH DELICA LANCETS 30 gauge Misc       pomegranate xt/pomegranat seed (POMEGRANATE ORAL) Take 8 oz by mouth once daily.      simvastatin (ZOCOR) 40 MG tablet Take 1 tablet by mouth every other day.       promethazine-dextromethorphan (PROMETHAZINE-DM) 6.25-15 mg/5 mL Syrp Take 5 mLs by mouth 3 (three) times daily as needed. 118 mL 0    sulfamethoxazole-trimethoprim 800-160mg (BACTRIM DS) 800-160 mg Tab Take 1 tablet by mouth 2 (two) times daily. for 10 days 20 tablet 0     No current facility-administered medications for this visit.        Physical Exam   Constitutional: He is oriented to person, place, and time. He appears well-developed. No distress.   HENT:   Head: Normocephalic and atraumatic.   Nose: Rhinorrhea present.   Mouth/Throat: Posterior oropharyngeal edema present.   Eyes: Pupils are equal, round, and reactive to light. EOM are normal.   Neck: Normal range of motion. Neck supple.   Cardiovascular: Normal rate and regular rhythm.   Pulmonary/Chest: Effort normal. He has decreased breath sounds. He has wheezes. He has rhonchi.   Loose cough   Musculoskeletal: Normal range of motion.   Neurological: He is alert and oriented to person, place, and time.   Skin: Skin is warm and dry. No rash noted.   Psychiatric: He has a normal mood and affect. Thought content normal.   Nursing note and vitals reviewed.      Assessment:       1. Bronchitis with bronchospasm        Plan:   Bronchitis with bronchospasm  -     methylPREDNISolone acetate injection 40 mg    Other orders  -     sulfamethoxazole-trimethoprim 800-160mg (BACTRIM DS) 800-160 mg Tab; Take 1  tablet by mouth 2 (two) times daily. for 10 days  Dispense: 20 tablet; Refill: 0  -     promethazine-dextromethorphan (PROMETHAZINE-DM) 6.25-15 mg/5 mL Syrp; Take 5 mLs by mouth 3 (three) times daily as needed.  Dispense: 118 mL; Refill: 0        No follow-ups on file.    There are no Patient Instructions on file for this visit.

## 2019-06-10 ENCOUNTER — TELEPHONE (OUTPATIENT)
Dept: NEUROLOGY | Facility: CLINIC | Age: 64
End: 2019-06-10

## 2019-06-10 ENCOUNTER — TELEPHONE (OUTPATIENT)
Dept: UROLOGY | Facility: CLINIC | Age: 64
End: 2019-06-10

## 2019-06-10 NOTE — TELEPHONE ENCOUNTER
----- Message from Jesus Ayala sent at 6/10/2019  9:59 AM CDT -----  Contact: Patient  Type: Needs Medical Advice    Who Called:  Patient  Best Call Back Number: [phone]  Additional Information: Patient would like to speak to Kera in regards to a possible recall that needs to be scheduled. Please call to advise. Thanks!

## 2019-07-02 ENCOUNTER — LAB VISIT (OUTPATIENT)
Dept: LAB | Facility: HOSPITAL | Age: 64
End: 2019-07-02
Attending: UROLOGY
Payer: MEDICARE

## 2019-07-02 DIAGNOSIS — R97.20 ELEVATED PSA: ICD-10-CM

## 2019-07-02 LAB — COMPLEXED PSA SERPL-MCNC: 11.8 NG/ML (ref 0–4)

## 2019-07-02 PROCEDURE — 84153 ASSAY OF PSA TOTAL: CPT

## 2019-07-02 PROCEDURE — 36415 COLL VENOUS BLD VENIPUNCTURE: CPT | Mod: PO

## 2019-07-10 ENCOUNTER — OFFICE VISIT (OUTPATIENT)
Dept: UROLOGY | Facility: CLINIC | Age: 64
End: 2019-07-10
Payer: MEDICARE

## 2019-07-10 VITALS
SYSTOLIC BLOOD PRESSURE: 145 MMHG | BODY MASS INDEX: 29.54 KG/M2 | HEIGHT: 70 IN | HEART RATE: 73 BPM | WEIGHT: 206.38 LBS | DIASTOLIC BLOOD PRESSURE: 83 MMHG

## 2019-07-10 DIAGNOSIS — N40.1 BENIGN PROSTATIC HYPERPLASIA WITH WEAK URINARY STREAM: ICD-10-CM

## 2019-07-10 DIAGNOSIS — Z80.42 FAMILY HX OF PROSTATE CANCER: ICD-10-CM

## 2019-07-10 DIAGNOSIS — R97.20 ELEVATED PSA: Primary | ICD-10-CM

## 2019-07-10 DIAGNOSIS — R39.12 BENIGN PROSTATIC HYPERPLASIA WITH WEAK URINARY STREAM: ICD-10-CM

## 2019-07-10 LAB
BILIRUB SERPL-MCNC: NORMAL MG/DL
BLOOD URINE, POC: NORMAL
COLOR, POC UA: NORMAL
GLUCOSE UR QL STRIP: NORMAL
KETONES UR QL STRIP: NORMAL
LEUKOCYTE ESTERASE URINE, POC: NORMAL
NITRITE, POC UA: NORMAL
PH, POC UA: 6
PROTEIN, POC: NORMAL
SPECIFIC GRAVITY, POC UA: 1.02
UROBILINOGEN, POC UA: 0.2

## 2019-07-10 PROCEDURE — 99213 OFFICE O/P EST LOW 20 MIN: CPT | Mod: PBBFAC,PO | Performed by: UROLOGY

## 2019-07-10 PROCEDURE — 99214 OFFICE O/P EST MOD 30 MIN: CPT | Mod: S$PBB,,, | Performed by: UROLOGY

## 2019-07-10 PROCEDURE — 99999 PR PBB SHADOW E&M-EST. PATIENT-LVL III: CPT | Mod: PBBFAC,,, | Performed by: UROLOGY

## 2019-07-10 PROCEDURE — 81002 URINALYSIS NONAUTO W/O SCOPE: CPT | Mod: PBBFAC,PO | Performed by: UROLOGY

## 2019-07-10 PROCEDURE — 99999 PR PBB SHADOW E&M-EST. PATIENT-LVL III: ICD-10-PCS | Mod: PBBFAC,,, | Performed by: UROLOGY

## 2019-07-10 PROCEDURE — 99214 PR OFFICE/OUTPT VISIT, EST, LEVL IV, 30-39 MIN: ICD-10-PCS | Mod: S$PBB,,, | Performed by: UROLOGY

## 2019-07-10 NOTE — PROGRESS NOTES
UROLOGY [place]  7 10 19    Cc elevated psa     Age 64, is being followed for elevated psa. The last psa was 11.8 this week, it was 9.8 in oct 2018, it was 12 in apr 2018, and 9.1 in oct 2017.     We did a prostate biopsy in April 2018 and before that in April 2017 and both were negative for malignancy.     In addition to the two negative biopsies, pt had an MDX test of biopsy tissue looking for DNA methilation in a previous biopsy material. The study showed 57% chance of having prostate cancer (31% of being 7 tavo or higher, and 26% of being tavo 6). The methilation was positive in the base and mid areas in the L side.      Pt has a hx of renal cyst and kidney stones     Pt says he is having erectile dysfunction, especially maintaining the erection.        PAST MEDICAL HISTORY:     SURGICAL: Ankle fracture surgery, extracorporeal shock wave lithotripsy x2,   2002 and 2004. Circumcision.     MEDICAL: Diabetes, arterial hypertension, hyperlipidemia, urolithiasis,   muscular dystrophy diagnosed in 2012 by the Neurology Service at [facility]   in the [facility]. The patient was leg braces to improve his walking.     FAMILIAL MEDICAL HISTORY: Father had prostate cancer, but did not die of that.   He also had lung cancer and did die of that.     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 an avid bicycle rider and rides twice a day 5 miles each time for a total of 10 miles daily                 Current Outpatient Prescriptions on File Prior to Visit   Medication Sig Dispense Refill    aspirin 81 MG chewable tablet Davidson        co-enzyme Q-10 30 mg capsule Take         hydrochlorothiazide (MICROZIDE) 12.5 mg capsule Take 1        metformin (GLUCOPHAGE) 500 Take        ONETOUCH DELICA LANC          simvastatin (ZOCOR) 40 M Take 1 ta        allopurinol (ZYLOPRIM Take 1  30 tablet 5    levocetirizine (XYZAL) 5 MG tablet  Take 1 10 tablet 0   ROS:  Denies malaise, headaches, eye symptoms, difficulty swallowing or breathing problems.   No chest pains or palpitations.   No change in bowel habits, no tarry stools or hematochezia. No acid reflux.  No genital lesions.  No bleeding disorders, no seizures.  Psych: normal mentation, normal affect        ALLERGIES: NEXIUM.        PHYSICAL EXAMINATION:  Pt alert, oriented, no distress  HEENT: wnl.  Neck: supple, no JVD, no lymphadenopathy  Chest: CV NSR  Lungs: normal chest expansion  ABDOMEN: Flat and nontender. No masses. No surgical scars. No hernias detected.  GENITOURINARY: Penis circumcised. Meatus normal. Testes normal.  JULY: Shows 40 g prostate without indurations or nodules.  Extremities: no edema, peripheral pulses nl  Neuro: preserved        IMPRESSION:   Elevated psa with negative prostate biopsy again in apr 2018  The MDX study showed high chance of prostate malignancy, and I suggest we update the prostate biopsy at this time. Wants to do the urine test for DNA to check his risk again    Renal cyst  Kidney stones  Family hx of prost cancer

## 2019-07-17 ENCOUNTER — CLINICAL SUPPORT (OUTPATIENT)
Dept: UROLOGY | Facility: CLINIC | Age: 64
End: 2019-07-17
Payer: MEDICARE

## 2019-07-17 DIAGNOSIS — R97.20 ELEVATED PSA: Primary | ICD-10-CM

## 2019-07-29 ENCOUNTER — TELEPHONE (OUTPATIENT)
Dept: UROLOGY | Facility: CLINIC | Age: 64
End: 2019-07-29

## 2019-07-29 NOTE — TELEPHONE ENCOUNTER
I called pt to let him know the results of his recent ExoDX testing (tests three genes on exosomal RNA); results show that his changes of 'clinically significant' prostate cancer are low (12 percent). The number is consider low if it is less than 15 percent.     I could not talk to pt because nobody picked up the phone, so I left a message in the answering machine saying that the result was 'good' and that we needed to talk.     On the basis of this finding I would not pressure pt to immediately have another prostate bx. I can see him again in 6 mo with another psa and JULY

## 2019-07-29 NOTE — TELEPHONE ENCOUNTER
----- Message from Cristine Strong sent at 7/29/2019  3:52 PM CDT -----  Contact: self   Placed call to pod, patient think he miss call from your office please call back at [phone] (rnnf)

## 2019-08-05 ENCOUNTER — TELEPHONE (OUTPATIENT)
Dept: UROLOGY | Facility: CLINIC | Age: 64
End: 2019-08-05

## 2019-08-05 DIAGNOSIS — R97.20 ELEVATED PSA: ICD-10-CM

## 2019-08-05 DIAGNOSIS — E29.1 HYPOGONADISM MALE: Primary | ICD-10-CM

## 2019-08-05 NOTE — TELEPHONE ENCOUNTER
I had a conversation with pt about his recent ExoDX results. The results said that pt was at low risk of clinically significant prostate cancer.     Pt will need to have another check in 6 months, so i'll expect him in the office in jan 2020    At the time of his visit we will repeat the JULY. It would be good if he gets the psa drawn before he comes to see me. He also needs testosterone drawn before he sees me. Both have been ordered.    In order to get the ExoDX repeated also in jan 2020, we can have him bring the first urine in the morning the day of the appointment in jan 2020. He needs to  the kit from the ExoDX  so his specimen is well obtained. He will pick it up in the next weeks and have it at home in [place] until the day he uses it in the morning prior to his visit (please give him an early a.m. appointment)

## 2019-12-03 ENCOUNTER — OFFICE VISIT (OUTPATIENT)
Dept: FAMILY MEDICINE | Facility: CLINIC | Age: 64
End: 2019-12-03
Payer: MEDICARE

## 2019-12-03 ENCOUNTER — HOSPITAL ENCOUNTER (OUTPATIENT)
Dept: RADIOLOGY | Facility: HOSPITAL | Age: 64
Discharge: HOME | End: 2019-12-03
Attending: FAMILY MEDICINE
Payer: MEDICARE

## 2019-12-03 VITALS
DIASTOLIC BLOOD PRESSURE: 79 MMHG | HEART RATE: 79 BPM | WEIGHT: 205.81 LBS | SYSTOLIC BLOOD PRESSURE: 123 MMHG | TEMPERATURE: 99 F | BODY MASS INDEX: 29.46 KG/M2 | HEIGHT: 70 IN

## 2019-12-03 DIAGNOSIS — E11.9 TYPE 2 DIABETES MELLITUS WITHOUT COMPLICATION, WITHOUT LONG-TERM CURRENT USE OF INSULIN: ICD-10-CM

## 2019-12-03 DIAGNOSIS — R06.2 WHEEZING: ICD-10-CM

## 2019-12-03 DIAGNOSIS — J20.9 BRONCHITIS WITH BRONCHOSPASM: Primary | ICD-10-CM

## 2019-12-03 DIAGNOSIS — Z11.59 NEED FOR HEPATITIS C SCREENING TEST: ICD-10-CM

## 2019-12-03 LAB
INFLUENZA A, MOLECULAR: NEGATIVE
INFLUENZA B, MOLECULAR: NEGATIVE
SPECIMEN SOURCE: NORMAL

## 2019-12-03 PROCEDURE — 99999 PR PBB SHADOW E&M-EST. PATIENT-LVL IV: ICD-10-PCS | Mod: PBBFAC,,, | Performed by: FAMILY MEDICINE

## 2019-12-03 PROCEDURE — 99214 OFFICE O/P EST MOD 30 MIN: CPT | Mod: S$PBB,,, | Performed by: FAMILY MEDICINE

## 2019-12-03 PROCEDURE — 99214 OFFICE O/P EST MOD 30 MIN: CPT | Mod: PBBFAC,25,PO | Performed by: FAMILY MEDICINE

## 2019-12-03 PROCEDURE — 99999 PR PBB SHADOW E&M-EST. PATIENT-LVL IV: CPT | Mod: PBBFAC,,, | Performed by: FAMILY MEDICINE

## 2019-12-03 PROCEDURE — 87502 INFLUENZA DNA AMP PROBE: CPT | Mod: PO

## 2019-12-03 PROCEDURE — 99214 PR OFFICE/OUTPT VISIT, EST, LEVL IV, 30-39 MIN: ICD-10-PCS | Mod: S$PBB,,, | Performed by: FAMILY MEDICINE

## 2019-12-03 PROCEDURE — 71046 X-RAY EXAM CHEST 2 VIEWS: CPT | Mod: 26,,, | Performed by: RADIOLOGY

## 2019-12-03 PROCEDURE — 71046 X-RAY EXAM CHEST 2 VIEWS: CPT | Mod: TC,PO

## 2019-12-03 PROCEDURE — 71046 XR CHEST PA AND LATERAL: ICD-10-PCS | Mod: 26,,, | Performed by: RADIOLOGY

## 2019-12-03 RX ORDER — METHYLPREDNISOLONE 4 MG/1
TABLET ORAL
Qty: 21 TABLET | Refills: 0 | Status: SHIPPED | OUTPATIENT
Start: 2019-12-03 | End: 2020-06-17

## 2019-12-03 RX ORDER — DOXYCYCLINE 100 MG/1
100 CAPSULE ORAL 2 TIMES DAILY
Qty: 14 CAPSULE | Refills: 0 | Status: SHIPPED | OUTPATIENT
Start: 2019-12-03 | End: 2019-12-10

## 2019-12-03 RX ORDER — ALBUTEROL SULFATE 90 UG/1
2 AEROSOL, METERED RESPIRATORY (INHALATION) EVERY 6 HOURS PRN
Qty: 18 G | Refills: 0 | Status: SHIPPED | OUTPATIENT
Start: 2019-12-03 | End: 2019-12-20

## 2019-12-03 NOTE — PATIENT INSTRUCTIONS
"  Using an Inhaler  Your healthcare provider may prescribe medicine that you breathe in using a metered-dose inhaler (MDI). An inhaler sends a measured amount of medicine in a fine mist.  Step 1:  · Shake the inhaler and remove the cap.  · Take a deep breath and let it out.  Step 2:  · Close your lips around the end of the inhaler mouthpiece. Or if you were told to use the "open-mouth" method, hold the inhaler 1 to 2 inches from your mouth.  Step 3:  · Breathe in slowly and deeply as you press down on the inhaler to release the medicine.  · Inhale fully.  Step 4:  · Hold your breath for a count of 10, or as long as you can comfortably.  · Then breathe out slowly through your mouth.  · Repeat these steps for each puff of medicine prescribed.             Important  · If the inhaler is being used for the first time or has not been used for a while, prime it as directed by the product maker. You can find important information about the medicine in the package insert. This is the paper that comes with the medicine.  · If you use more than one inhaler, make sure you know which one to use first.  · Your healthcare provider or pharmacist can show you how to use your inhaler the right way. Even if you think you are using it the right way, it is still a good idea to check.   Date Last Reviewed: 10/1/2016  © 5741-5201 [employer]. [address] [place] [address], [place] [address]. All rights reserved. This information is not intended as a substitute for professional medical care. Always follow your healthcare professional's instructions.          "

## 2019-12-03 NOTE — PROGRESS NOTES
Complains of cough chest congestion recent low-grade fever, wheezes.  No history of asthma.  Previous history of wheezing last spring.  Recent sick contacts and has been out of the country.  Denies body aches.  Type 2 diabetes has been followed by his cardiologist.  He believes his last hemoglobin A1c was around 6.8.  Has not seen primary care physician.      Merrill was seen today for cough, wheezing, nasal congestion and follow-up.    Diagnoses and all orders for this visit:    Bronchitis with bronchospasm  -     X-Ray Chest PA And Lateral; Future  -     Influenza A & B by Molecular    Type 2 diabetes mellitus without complication, without long-term current use of insulin  -     Lipid panel; Future  -     Microalbumin/creatinine urine ratio; Future  -     Hemoglobin A1c; Future  -     Comprehensive metabolic panel; Future  -     Lipid panel  -     Microalbumin/creatinine urine ratio  -     Hemoglobin A1c  -     Comprehensive metabolic panel    Need for hepatitis C screening test  -     Hepatitis C antibody; Future  -     Hepatitis C antibody    Other orders  -     doxycycline (MONODOX) 100 MG capsule; Take 1 capsule (100 mg total) by mouth 2 (two) times daily. for 7 days  -     methylPREDNISolone (MEDROL DOSEPACK) 4 mg tablet; follow package directions  -     albuterol (PROVENTIL/VENTOLIN HFA) 90 mcg/actuation inhaler; Inhale 2 puffs into the lungs every 6 (six) hours as needed for Wheezing or Shortness of Breath.      XR CHEST PA AND LATERAL    CLINICAL HISTORY:  Wheezing    TECHNIQUE:  PA and lateral views of the chest were performed.    COMPARISON:  12/01/2014    FINDINGS:  The cardiac and mediastinal silhouettes appear within normal limits.   Elevation of the right hemidiaphragm appears similar to prior.  There is some mild associated subsegmental atelectasis in the right lung base which may be slightly worsened.  Left lung appears clear.  No definite parenchymal consolidation or pleural effusion visualized.  " Mild thoracic scoliosis noted.      Impression       As above      Electronically signed by: Jax Sanz MD  Date: 12/03/2019     Influenza swab negative.  Follow-up symptoms not improving with above.  Request last note from his cardiologist, note regarding previous immunizations, note from his recent laboratory, note from his last eye exam.  Will schedule follow-up laboratory in the next couple months depending on when he had his last lab work done.          Diabetes Management Status    Statin: Taking  ACE/ARB: Taking    Screening or Prevention Patient's value Goal Complete/Controlled?   HgA1C Testing and Control   No results found for: HGBA1C   Annually/Less than 8% No   Lipid profile Most Recent Lipid Panel Health Maintenance Topic Completion: Not Found Annually No   LDL control No results found for: LDLCALC Annually/Less than 100 mg/dl  No   Nephropathy screening No results found for: LABMICR  No results found for: PROTEINUA Annually No   Blood pressure BP Readings from Last 1 Encounters:   12/03/19 123/79    Less than 140/90 Yes   Dilated retinal exam : 05/14/2015 Annually No   Foot exam   : 12/03/2019 Annually Yes       Past Medical History:  Past Medical History:   Diagnosis Date    Anticoagulant long-term use     Diabetes mellitus, type 2     H. pylori infection 02/01/2018    had EGD, undergoing treatment    HEARING LOSS     Hyperlipidemia     Hypertension     Kidney stone     Muscular dystrophy 2012    diagnosed neurology [facility] -wears leg braces    Urolithiasis      Past Surgical History:   Procedure Laterality Date    ANKLE FRACTURE SURGERY      CIRCUMCISION      [place]OSCOPY  ~[place] or 05  ([place]  [place]    "Nothing"    ESOPHAGOGASTRODUODENOSCOPY      EXTRACORPOREAL SHOCK WAVE LITHOTRIPSY  2002, 2004    x 2 - dr chivo joshi ([facility])    FACIAL LACERATIONS REPAIR      as a child    MULTIPLE TOOTH EXTRACTIONS      ORIF ankle fracture  1988    PROSTATE BIOPSY       Review of " patient's allergies indicates:   Allergen Reactions    Ditropan [oxybutynin chloride] Other (See Comments)     Severe reflux    Nexium [esomeprazole magnesium] Rash     Current Outpatient Medications on File Prior to Visit   Medication Sig Dispense Refill    blood sugar diagnostic (BLOOD GLUCOSE TEST) Strp by Misc.(Non-Drug; Combo Route) route. One touch Ultra Blue      BYSTOLIC 20 mg Tab Take 1 tablet by mouth once daily.  0    co-enzyme Q-10 30 mg capsule Take 100 mg by mouth once daily. Qunol 100 mg      FLAXSEED ORAL Take by mouth. Flaxseed Ground-2 tablespoons daily      hydrochlorothiazide (HYDRODIURIL) 25 MG tablet Take 25 mg by mouth once daily.  1    losartan (COZAAR) 50 MG tablet Take 50 mg by mouth once daily. Take half qam & a whole every evening      metformin (GLUCOPHAGE) 1000 MG tablet Take 1,000 mg by mouth 2 (two) times daily with meals.   1    multivitamin with minerals tablet Take 1 tablet by mouth once daily.      ONETOUCH DELICA LANCETS 30 gauge Misc       pomegranate xt/pomegranat seed (POMEGRANATE ORAL) Take 8 oz by mouth once daily.      simvastatin (ZOCOR) 40 MG tablet Take 1 tablet by mouth every other day.        No current facility-administered medications on file prior to visit.      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     Comment: [place], [place], [employer] plant    Occupation: avid bicycle rider, rides twice a day     Comment: 10 miles a day    Social Needs    Financial resource strain: Not on file    Food insecurity:     Worry: Not on file     Inability: Not on file    Transportation needs:     Medical: Not on file     Non-medical: Not on file   Tobacco Use    Smoking status: Never Smoker    Smokeless tobacco:  "Never Used   Substance and Sexual Activity    Alcohol use: No    Drug use: No    Sexual activity: Never   Lifestyle    Physical activity:     Days per week: Not on file     Minutes per session: Not on file    Stress: Not on file   Relationships    Social connections:     Talks on phone: Not on file     Gets together: Not on file     Attends Moravian service: Not on file     Active member of club or organization: Not on file     Attends meetings of clubs or organizations: Not on file     Relationship status: Not on file   Other Topics Concern    Not on file   Social History Narrative    Not on file     Family History   Problem Relation Age of Onset    Diabetes Father     Hypertension Father     Muscular dystrophy Father     Cancer Father         prostate, did not die of/lung,  of    Prostate cancer Father         did no0t die of it    Hypertension Mother     Scoliosis Sister     Stroke Sister         massive - SUDDEN DEATH    Pulmonary embolism Paternal Grandfather     Prostate cancer Paternal Grandfather            ROS:  GENERAL: No fever, chills,  or significant weight changes.   CARDIOVASCULAR: Denies chest pain, PND, orthopnea or reduced exercise tolerance.  ABDOMEN: Appetite fine. Denies diarrhea, abdominal pain, hematemesis or blood in stool.  URINARY: No flank pain, dysuria or hematuria.      OBJECTIVE:   Vitals:    19 1127   BP: 123/79   Pulse: 79   Temp: 98.5 °F (36.9 °C)   Weight: 93.4 kg (205 lb 12.8 oz)   Height: 5' 10" (1.778 m)     Wt Readings from Last 3 Encounters:   19 93.4 kg (205 lb 12.8 oz)   07/10/19 93.6 kg (206 lb 5.6 oz)   19 94.1 kg (207 lb 6.4 oz)       APPEARANCE: Well nourished, well developed, in no acute distress.   HEAD: Normocephalic. Atraumatic. No sinus tenderness.   EYES:   Right eye: Pupil reactive. Conjunctiva clear.   Left eye: Pupil reactive. Conjunctiva clear.   Both fundi: Grossly normal to nondilated exam. EOMI.   EARS: TM's intact. " Light reflex normal. No retraction or perforation.   NOSE: clear.   MOUTH & THROAT: No pharyngeal erythema or exudate. No lesions.   NECK: No bruits. No JVD. No cervical lymphadenopathy. No thyromegaly.   CHEST:  He has some scattered bilateral wheezes.  Slight rhonchi at the bases.. Normal respiratory effort   CARDIOVASCULAR: Normal rate. Regular rhythm. No murmurs. No rub. No gallops.   ABDOMEN: Bowel sounds normal. Soft. No tenderness. No organomegaly.   PERIPHERAL VASCULAR: No cyanosis. No clubbing. No edema.   NEUROLOGIC: No focal findings.    Feet:Sensation somewhat diminished in the feet intact to monofilament testing.   No significant foot lesions.Pulses palpable.  MENTAL STATUS: Alert. Oriented x 3.

## 2019-12-05 LAB
ALBUMIN SERPL-MCNC: 4.6 G/DL (ref 3.6–4.8)
ALBUMIN/CREAT UR: <5.7 MG/G CREAT (ref 0–30)
ALBUMIN/GLOB SERPL: 1.7 {RATIO} (ref 1.2–2.2)
ALP SERPL-CCNC: 83 IU/L (ref 39–117)
ALT SERPL-CCNC: 25 IU/L (ref 0–44)
AST SERPL-CCNC: 25 IU/L (ref 0–40)
BILIRUB SERPL-MCNC: 0.4 MG/DL (ref 0–1.2)
BUN SERPL-MCNC: 13 MG/DL (ref 8–27)
BUN/CREAT SERPL: 20 (ref 10–24)
CALCIUM SERPL-MCNC: 9.8 MG/DL (ref 8.6–10.2)
CHLORIDE SERPL-SCNC: 98 MMOL/L (ref 96–106)
CHOLEST SERPL-MCNC: 174 MG/DL (ref 100–199)
CO2 SERPL-SCNC: 23 MMOL/L (ref 20–29)
CREAT SERPL-MCNC: 0.65 MG/DL (ref 0.76–1.27)
CREAT UR-MCNC: 52.9 MG/DL
GLOBULIN SER CALC-MCNC: 2.7 G/DL (ref 1.5–4.5)
GLUCOSE SERPL-MCNC: 153 MG/DL (ref 65–99)
HBA1C MFR BLD: 7.4 % (ref 4.8–5.6)
HCV AB S/CO SERPL IA: <0.1 S/CO RATIO (ref 0–0.9)
HDLC SERPL-MCNC: 48 MG/DL
LDLC SERPL CALC-MCNC: 108 MG/DL (ref 0–99)
MICROALBUMIN UR-MCNC: <3 UG/ML
POTASSIUM SERPL-SCNC: 4.2 MMOL/L (ref 3.5–5.2)
PROT SERPL-MCNC: 7.3 G/DL (ref 6–8.5)
SODIUM SERPL-SCNC: 140 MMOL/L (ref 134–144)
TRIGL SERPL-MCNC: 92 MG/DL (ref 0–149)
VLDLC SERPL CALC-MCNC: 18 MG/DL (ref 5–40)

## 2019-12-06 DIAGNOSIS — E78.9 BORDERLINE HIGH CHOLESTEROL: ICD-10-CM

## 2019-12-06 DIAGNOSIS — E11.9 TYPE 2 DIABETES MELLITUS WITHOUT COMPLICATION, WITHOUT LONG-TERM CURRENT USE OF INSULIN: Primary | ICD-10-CM

## 2019-12-06 RX ORDER — ROSUVASTATIN CALCIUM 20 MG/1
20 TABLET, COATED ORAL DAILY
Qty: 90 TABLET | Refills: 3 | Status: SHIPPED | OUTPATIENT
Start: 2019-12-06 | End: 2020-12-01

## 2019-12-06 NOTE — TELEPHONE ENCOUNTER
Notes recorded by Go Raza MD on 12/5/2019 at 5:22 PM CST  Hepatitis C testing normal.  Cholesterol borderline.  Urine test okay.  Sugar test borderline.  Recommend discontinue simvastatin and substitute Crestor 20 mg daily.  Continue current metformin.  Watch diet and try to lose weight.  Recheck lipid, ALT, hemoglobin A1c in 6 months.. My nurse will contact you to arrange.  Thanks,  Dr. Raza

## 2019-12-20 RX ORDER — ALBUTEROL SULFATE 90 UG/1
AEROSOL, METERED RESPIRATORY (INHALATION)
Qty: 18 INHALER | Refills: 1 | Status: SHIPPED | OUTPATIENT
Start: 2019-12-20 | End: 2020-06-17

## 2020-02-06 RX ORDER — CARVEDILOL 12.5 MG/1
12.5 TABLET ORAL 2 TIMES DAILY
COMMUNITY
Start: 2020-01-27 | End: 2024-01-02

## 2020-05-13 ENCOUNTER — TELEPHONE (OUTPATIENT)
Dept: FAMILY MEDICINE | Facility: CLINIC | Age: 65
End: 2020-05-13

## 2020-05-13 DIAGNOSIS — Z11.9 SCREENING EXAMINATION FOR INFECTIOUS DISEASE: Primary | ICD-10-CM

## 2020-05-13 NOTE — TELEPHONE ENCOUNTER
Patient is requesting a covid antibody test.  He had traveled out of the country in November 2019 with his spouse and 4 other people and they all came home with colds, we have ordered for the spouse, would you please order for pt.

## 2020-06-09 LAB
CHOLEST SERPL-MSCNC: 124 MG/DL (ref 0–200)
HBA1C MFR BLD: 7.5 % (ref 4.8–5.6)
HDLC SERPL-MCNC: 42 MG/DL (ref 35–70)
LDL/HDL RATIO: 1.3 (ref 0–3.6)
LDLC SERPL CALC-MCNC: 56 MG/DL (ref 0–99)
TRIGL SERPL-MCNC: 131 MG/DL (ref 0–150)
VLDL CHOLESTEROL: 26 MG/DL (ref 5–40)

## 2020-06-17 ENCOUNTER — OFFICE VISIT (OUTPATIENT)
Dept: FAMILY MEDICINE | Facility: CLINIC | Age: 65
End: 2020-06-17
Payer: MEDICARE

## 2020-06-17 VITALS
WEIGHT: 205 LBS | HEIGHT: 70 IN | DIASTOLIC BLOOD PRESSURE: 98 MMHG | SYSTOLIC BLOOD PRESSURE: 161 MMHG | BODY MASS INDEX: 29.35 KG/M2 | TEMPERATURE: 99 F | HEART RATE: 81 BPM

## 2020-06-17 DIAGNOSIS — E11.59 HYPERTENSION ASSOCIATED WITH DIABETES: ICD-10-CM

## 2020-06-17 DIAGNOSIS — E11.69 HYPERLIPIDEMIA ASSOCIATED WITH TYPE 2 DIABETES MELLITUS: ICD-10-CM

## 2020-06-17 DIAGNOSIS — I15.2 HYPERTENSION ASSOCIATED WITH DIABETES: ICD-10-CM

## 2020-06-17 DIAGNOSIS — E11.9 TYPE 2 DIABETES MELLITUS WITHOUT COMPLICATION, WITHOUT LONG-TERM CURRENT USE OF INSULIN: Primary | ICD-10-CM

## 2020-06-17 DIAGNOSIS — Z12.11 SCREENING FOR COLON CANCER: ICD-10-CM

## 2020-06-17 DIAGNOSIS — E78.5 HYPERLIPIDEMIA ASSOCIATED WITH TYPE 2 DIABETES MELLITUS: ICD-10-CM

## 2020-06-17 DIAGNOSIS — Z86.010 HISTORY OF ADENOMATOUS POLYP OF COLON: ICD-10-CM

## 2020-06-17 PROCEDURE — 99214 OFFICE O/P EST MOD 30 MIN: CPT | Mod: S$PBB,,, | Performed by: FAMILY MEDICINE

## 2020-06-17 PROCEDURE — 99214 PR OFFICE/OUTPT VISIT, EST, LEVL IV, 30-39 MIN: ICD-10-PCS | Mod: S$PBB,,, | Performed by: FAMILY MEDICINE

## 2020-06-17 PROCEDURE — 99214 OFFICE O/P EST MOD 30 MIN: CPT | Mod: PBBFAC,PO | Performed by: FAMILY MEDICINE

## 2020-06-17 PROCEDURE — G0009 ADMIN PNEUMOCOCCAL VACCINE: HCPCS | Mod: PBBFAC,PO

## 2020-06-17 PROCEDURE — 99999 PR PBB SHADOW E&M-EST. PATIENT-LVL IV: CPT | Mod: PBBFAC,,, | Performed by: FAMILY MEDICINE

## 2020-06-17 PROCEDURE — 99999 PR PBB SHADOW E&M-EST. PATIENT-LVL IV: ICD-10-PCS | Mod: PBBFAC,,, | Performed by: FAMILY MEDICINE

## 2020-06-17 RX ORDER — LOSARTAN POTASSIUM 100 MG/1
100 TABLET ORAL DAILY
Qty: 90 TABLET | Refills: 0 | Status: SHIPPED | OUTPATIENT
Start: 2020-06-17 | End: 2020-09-09

## 2020-06-17 NOTE — PROGRESS NOTES
Follow-up diabetes.  States hemoglobin A1c 7.5 at his cardiologist office this past week.  They recommended that he add Jardiance, but he is concerned about doing this and wants to try diet 1st.  He is compliant with his metformin.  He has gained some weight.  Hyperlipidemia states recent laboratory was better.  We do not have copy.  Blood pressure is elevated.  He is also due for colonoscopy.        Merrill was seen today for annual exam.    Diagnoses and all orders for this visit:    Type 2 diabetes mellitus without complication, without long-term current use of insulin  -     Ambulatory referral/consult to Optometry; Future  -     Hemoglobin A1C; Future    Hyperlipidemia associated with type 2 diabetes mellitus    Hypertension associated with diabetes    History of adenomatous polyp of colon  -     Case request GI: COLONOSCOPY    Screening for colon cancer  -     Case request GI: COLONOSCOPY    Other orders  -     losartan (COZAAR) 100 MG tablet; Take 1 tablet (100 mg total) by mouth once daily.  -     Pneumococcal Polysaccharide Vaccine (23 Valent) (SQ/IM)      Increase losartan.  I believe addition of Jardiance would be reasonable however he wants to wait until his next A1c.  Will recheck in hemoglobin A1c in 3 months.  Request note from his cardiologist as well as copy of the recent laboratory reports.  Recheck blood pressure with nurse in 4 weeks.          Diabetes Management Status    Statin: Taking  ACE/ARB: Taking    Screening or Prevention Patient's value Goal Complete/Controlled?   HgA1C Testing and Control   Lab Results   Component Value Date    HGBA1C 7.4 (H) 12/04/2019      Annually/Less than 8% Yes   Lipid profile : 12/04/2019 Annually Yes   LDL control Lab Results   Component Value Date    LDLCALC 108 (H) 12/04/2019    Annually/Less than 100 mg/dl  No   Nephropathy screening Lab Results   Component Value Date    LABMICR <5.7 12/04/2019     No results found for: PROTEINUA Annually Yes   Blood  "pressure BP Readings from Last 1 Encounters:   06/17/20 (!) 161/98    Less than 140/90 No   Dilated retinal exam : 02/26/2019 Annually No   Foot exam   : 12/03/2019 Annually Yes       Past Medical History:  Past Medical History:   Diagnosis Date    Diabetes mellitus, type 2     H. pylori infection 02/01/2018    had EGD, undergoing treatment    HEARING LOSS     Hyperlipidemia     Hypertension     Kidney stone     Muscular dystrophy 2012    diagnosed neurology [facility] -wears leg braces    MVP (mitral valve prolapse)     Tubular adenoma of colon     Urolithiasis      Past Surgical History:   Procedure Laterality Date    ANKLE FRACTURE SURGERY      CIRCUMCISION      COLONOSCOPY  ~2004 or 05  ([place]  [place]    "Nothing"    ESOPHAGOGASTRODUODENOSCOPY      EXTRACORPOREAL SHOCK WAVE LITHOTRIPSY  2002, 2004    x 2 - dr chivo joshi ([facility])    FACIAL LACERATIONS REPAIR      as a child    MULTIPLE TOOTH EXTRACTIONS      ORIF ankle fracture  1988    PROSTATE BIOPSY       Review of patient's allergies indicates:   Allergen Reactions    Ditropan [oxybutynin chloride] Other (See Comments)     Severe reflux    Nexium [esomeprazole magnesium] Rash     Current Outpatient Medications on File Prior to Visit   Medication Sig Dispense Refill    blood sugar diagnostic (BLOOD GLUCOSE TEST) Strp by Misc.(Non-Drug; Combo Route) route. One touch Ultra Blue      BYSTOLIC 20 mg Tab Take 1 tablet by mouth once daily.  0    carvediloL (COREG) 12.5 MG tablet Take 12.5 mg by mouth 2 (two) times daily.       co-enzyme Q-10 30 mg capsule Take 100 mg by mouth once daily. Qunol 100 mg      FLAXSEED ORAL Take by mouth. Flaxseed Ground-2 tablespoons daily      hydrochlorothiazide (HYDRODIURIL) 25 MG tablet Take 25 mg by mouth once daily.  1    metformin (GLUCOPHAGE) 1000 MG tablet Take 1,000 mg by mouth 2 (two) times daily with meals.   1    multivitamin with minerals tablet Take 1 tablet by mouth once daily.      " ONETOUCH DELICA LANCETS 30 gauge Misc       pomegranate xt/pomegranat seed (POMEGRANATE ORAL) Take 8 oz by mouth once daily.      rosuvastatin (CRESTOR) 20 MG tablet Take 1 tablet (20 mg total) by mouth once daily. 90 tablet 3    [DISCONTINUED] losartan (COZAAR) 50 MG tablet Take 50 mg by mouth once daily. Take half qam & a whole every evening      [DISCONTINUED] albuterol (PROVENTIL/VENTOLIN HFA) 90 mcg/actuation inhaler INHALE 2 PUFFS BY MOUTH EVERY 6 HOURS AS NEEDED FOR WHEEZE OR SHORTNESS OF BREATH 18 Inhaler 1    [DISCONTINUED] methylPREDNISolone (MEDROL DOSEPACK) 4 mg tablet follow package directions 21 tablet 0     No current facility-administered medications on file prior to visit.      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     Comment: [employer]    Occupation: avid bicycle rider, rides twice a day     Comment: 10 miles a day    Social Needs    Financial resource strain: Not hard at all    Food insecurity     Worry: Never true     Inability: Never true    Transportation needs     Medical: No     Non-medical: No   Tobacco Use    Smoking status: Never Smoker    Smokeless tobacco: Never Used   Substance and Sexual Activity    Alcohol use: No     Frequency: Never     Drinks per session: 1 or 2     Binge frequency: Never    Drug use: No    Sexual activity: Never   Lifestyle    Physical activity     Days per week: 0 days     Minutes per session: 0 min    Stress: Only a little   Relationships    Social connections     Talks on phone: Three times a week     Gets together: Once a week     Attends Alevism service: Not on file     Active member of club or organization: No     Attends meetings of clubs or organizations: Never      Relationship status:    Other Topics Concern    Not on file   Social History Narrative    Not on file     Family History   Problem Relation Age of Onset    Diabetes Father     Hypertension Father     Muscular dystrophy Father     Prostate cancer Father         did no0t die of it    Lung cancer Father         prostate, did not die of/lung,  of    Hypertension Mother     Scoliosis Sister     Stroke Sister         massive - SUDDEN DEATH    Pulmonary embolism Paternal Grandfather     Prostate cancer Paternal Grandfather          Answers for HPI/ROS submitted by the patient on 6/15/2020   Diabetes problem  Diabetes type: type 2  MedicAlert ID: No  Disease duration: 20 years  blurred vision: No  chest pain: No  fatigue: No  foot paresthesias: No  foot ulcerations: No  polydipsia: No  polyphagia: No  polyuria: No  visual change: No  weakness: No  weight loss: No  Symptom course: worsening  confusion: No  dizziness: No  headaches: No  hunger: No  mood changes: No  nervous/anxious: No  pallor: No  seizures: No  sleepiness: No  speech difficulty: No  sweats: No  tremors: Yes  blackouts: No  hospitalization: No  nocturnal hypoglycemia: No  required assistance: No  required glucagon: No  CVA: No  heart disease: No  impotence: Yes  nephropathy: No  peripheral neuropathy: Yes  PVD: No  retinopathy: No  autonomic neuropathy: No  CAD risks: dyslipidemia, family history, hypertension, obesity, sedentary lifestyle, diabetes mellitus, male sex  Current treatments: diet, oral agent (monotherapy)  Treatment compliance: some of the time  Home blood tests: 1-2 x per day  Monitoring compliance: adequate  Blood glucose trend: increasing steadily  Weight trend: fluctuating minimally  Current diet: generally healthy  Meal planning: none  Exercise: intermittently  Dietitian visit: No  Eye exam current: No  Sees podiatrist: No    Vitals:    20 0800   BP: (!) 161/98   Pulse: 81   Temp: 98.5 °F (36.9 °C)   Weight:  "93 kg (205 lb)   Height: 5' 10" (1.778 m)       Wt Readings from Last 3 Encounters:   06/17/20 93 kg (205 lb)   12/03/19 93.4 kg (205 lb 12.8 oz)   07/10/19 93.6 kg (206 lb 5.6 oz)       APPEARANCE: Well nourished, well developed, in no acute distress.    HEAD: Normocephalic.  Atraumatic.  EYES:   Right eye: Pupil reactive.  Conjunctiva clear.    Left eye: Pupil reactive.  Conjunctiva clear.    NECK: Supple. No bruits.  No JVD.  No cervical lymphadenopathy.  No thyromegaly.    CHEST: Breath sounds clear bilaterally.  Normal respiratory effort  CARDIOVASCULAR: Normal rate.  Regular rhythm.  No murmurs.  No rub.  No gallops.   No edema.  MENTAL STATUS: Alert.  Oriented x 3.        "

## 2020-07-06 ENCOUNTER — PATIENT OUTREACH (OUTPATIENT)
Dept: ADMINISTRATIVE | Facility: HOSPITAL | Age: 65
End: 2020-07-06

## 2020-07-16 ENCOUNTER — CLINICAL SUPPORT (OUTPATIENT)
Dept: FAMILY MEDICINE | Facility: CLINIC | Age: 65
End: 2020-07-16
Payer: MEDICARE

## 2020-07-16 VITALS — SYSTOLIC BLOOD PRESSURE: 138 MMHG | TEMPERATURE: 99 F | DIASTOLIC BLOOD PRESSURE: 82 MMHG

## 2020-07-16 DIAGNOSIS — Z01.30 BP CHECK: Primary | ICD-10-CM

## 2020-07-16 PROCEDURE — 99212 OFFICE O/P EST SF 10 MIN: CPT | Mod: PBBFAC,PO

## 2020-07-16 PROCEDURE — 99999 PR PBB SHADOW E&M-EST. PATIENT-LVL II: CPT | Mod: PBBFAC,,,

## 2020-07-16 PROCEDURE — 99999 PR PBB SHADOW E&M-EST. PATIENT-LVL II: ICD-10-PCS | Mod: PBBFAC,,,

## 2020-07-16 PROCEDURE — 99211 OFF/OP EST MAY X REQ PHY/QHP: CPT | Mod: S$PBB,,, | Performed by: FAMILY MEDICINE

## 2020-07-16 PROCEDURE — 99211 PR OFFICE/OUTPT VISIT, EST, LEVL I: ICD-10-PCS | Mod: S$PBB,,, | Performed by: FAMILY MEDICINE

## 2020-07-16 NOTE — PROGRESS NOTES
Patient in clinic for BP check   -/76 automatic HR 86.   -/82 manually     *PCP notified by Epic message.*

## 2020-08-05 ENCOUNTER — PATIENT OUTREACH (OUTPATIENT)
Dept: ADMINISTRATIVE | Facility: OTHER | Age: 65
End: 2020-08-05

## 2020-08-07 ENCOUNTER — OFFICE VISIT (OUTPATIENT)
Dept: OPTOMETRY | Facility: CLINIC | Age: 65
End: 2020-08-07
Payer: MEDICARE

## 2020-08-07 DIAGNOSIS — E11.9 TYPE 2 DIABETES MELLITUS WITHOUT COMPLICATION, WITHOUT LONG-TERM CURRENT USE OF INSULIN: ICD-10-CM

## 2020-08-07 DIAGNOSIS — H40.013 OAG (OPEN ANGLE GLAUCOMA) SUSPECT, LOW RISK, BILATERAL: ICD-10-CM

## 2020-08-07 DIAGNOSIS — H25.13 NUCLEAR SCLEROSIS, BILATERAL: ICD-10-CM

## 2020-08-07 DIAGNOSIS — H52.03 HYPEROPIA WITH ASTIGMATISM AND PRESBYOPIA, BILATERAL: ICD-10-CM

## 2020-08-07 DIAGNOSIS — H52.4 HYPEROPIA WITH ASTIGMATISM AND PRESBYOPIA, BILATERAL: ICD-10-CM

## 2020-08-07 DIAGNOSIS — E11.9 DIABETES MELLITUS TYPE 2 WITHOUT RETINOPATHY: Primary | ICD-10-CM

## 2020-08-07 DIAGNOSIS — H52.203 HYPEROPIA WITH ASTIGMATISM AND PRESBYOPIA, BILATERAL: ICD-10-CM

## 2020-08-07 DIAGNOSIS — D31.31 CHOROIDAL NEVUS OF RIGHT EYE: ICD-10-CM

## 2020-08-07 DIAGNOSIS — Z83.511 FAMILY HISTORY OF GLAUCOMA: ICD-10-CM

## 2020-08-07 PROCEDURE — 92004 PR EYE EXAM, NEW PATIENT,COMPREHESV: ICD-10-PCS | Mod: S$PBB,,, | Performed by: OPTOMETRIST

## 2020-08-07 PROCEDURE — 92134 CPTRZ OPH DX IMG PST SGM RTA: CPT | Mod: PBBFAC,PO | Performed by: OPTOMETRIST

## 2020-08-07 PROCEDURE — 92004 COMPRE OPH EXAM NEW PT 1/>: CPT | Mod: S$PBB,,, | Performed by: OPTOMETRIST

## 2020-08-07 PROCEDURE — 99214 OFFICE O/P EST MOD 30 MIN: CPT | Mod: PBBFAC,PO | Performed by: OPTOMETRIST

## 2020-08-07 PROCEDURE — 99999 PR PBB SHADOW E&M-EST. PATIENT-LVL IV: CPT | Mod: PBBFAC,,, | Performed by: OPTOMETRIST

## 2020-08-07 PROCEDURE — 92250 COLOR FUNDUS PHOTOGRAPHY - OU - BOTH EYES: ICD-10-PCS | Mod: 26,S$PBB,, | Performed by: OPTOMETRIST

## 2020-08-07 PROCEDURE — 99999 PR PBB SHADOW E&M-EST. PATIENT-LVL IV: ICD-10-PCS | Mod: PBBFAC,,, | Performed by: OPTOMETRIST

## 2020-08-07 PROCEDURE — 92250 FUNDUS PHOTOGRAPHY W/I&R: CPT | Mod: PBBFAC,PO | Performed by: OPTOMETRIST

## 2020-08-07 NOTE — PROGRESS NOTES
"HPI     New patient here for diabetic eye exam dls- 1 year ago  Patient states he is pre diabetic, takes metformin and it seems to be   stable, patients blood pressure is stable on meds. pts vision is stable.   OS is watering a lot. When he is in the sun or bright lights he sees   "little flickers" OU. No new floaters. Using otc gtts for jaziel as needed,     Hemoglobin A1C       Date                     Value               Ref Range             Status                06/09/2020               7.5                 %                     Final                 06/09/2020               7.5 (H)             4.8 - 5.6 %           Final                 12/04/2019               7.4 (H)             4.8 - 5.6 %           Final              Comment:             Prediabetes: 5.7 - 6.4           Diabetes: >6.4             Glycemic control for adults with diabetes: <7.0    ----------    Last edited by Melany Ruiz MA on 8/7/2020 10:32 AM. (History)        ROS     Positive for: Eyes    Negative for: Constitutional, Gastrointestinal, Neurological, Skin,   Genitourinary, Musculoskeletal, HENT, Endocrine, Cardiovascular,   Respiratory, Psychiatric, Allergic/Imm, Heme/Lymph    Last edited by CORRIE Brice OD on 8/7/2020 10:50 AM. (History)        Assessment /Plan     For exam results, see Encounter Report.    Diabetes mellitus type 2 without retinopathy  -     Posterior Segment OCT Retina-Both eyes    Type 2 diabetes mellitus without complication, without long-term current use of insulin  -     Ambulatory referral/consult to Optometry  -     Posterior Segment OCT Retina-Both eyes    Nuclear sclerosis, bilateral    OAG (open angle glaucoma) suspect, low risk, bilateral  -     Color Fundus Photography - OU - Both Eyes    Choroidal nevus of right eye  -     Posterior Segment OCT Retina-Both eyes  -     Color Fundus Photography - OU - Both Eyes    Hyperopia with astigmatism and presbyopia, bilateral    Family history of " glaucoma      1,2. No kishor/ retinopathy, no csme, gave Diabetic Retinopathy info, control glucose and BP  Advise annual DFE  Macular OCT today---neg DME  Mild drusen OD    3. Early changes, not vis sig for consult  4. Moderate / large c/d suspect  Mid teens IOP  Angles open VH  +fhx glaucoma, reported in both parents    Will order fields and nerve OCT next exam  Disc photos obtained today w/ nevus    5. 1DD flat nevus at superior disc margin OD  Fundus photos today  6. Updated specs rx, gave copy  7. Reported mother/ father in chart  Pt remains high suspect    Discussed and educated patient on current findings /plan.  RTC 1 year, prn if any changes / issues

## 2020-08-07 NOTE — PATIENT INSTRUCTIONS
"DRY EYES -- BURNING OR MARLYS SYMPTOMS:  Use Over The Counter artificial tears as needed for dry eye symptoms.   Some common brands include:  Systane, Optive, Refresh, and Thera-Tears.  These drops can be used as frequently as desired, but may be most helpful use during long periods of concentrated work.  For example, reading / working at the computer. Start with 3-4x per day.     Nighttime Ophthalmic gel or ointments are available: Refresh PM, Genteal, and Lacrilube.    Avoid drops that "get redness out" (Visine, Murine, Clear Eyes), as these may contain medication that could further irritate the eyes, especially with chronic use.    ALLERGY EYES -- ITCHING SYMPTOMS:  Over the counter medications include--Pataday, Zaditor, and Alaway.  Use as directed 1-2 drops daily for symptoms of itching / watering eyes.  These drops will not help for dry eye or exposure symptoms.    REDNESS RELIEF:  Lumify---is a good redness reliever that will not cause irritation if used chronically.         FLASHES / FLOATERS / POSTERIOR VITREOUS DETACHMENT    Call the clinic if you have any further changes in symptoms.  Including:  Increased numbers of floaters or flashing lights, dimness or darkness that moves through or stays constant in your vision, or any pain in the eye (s).    You may sometimes see small specks or clouds moving in your field of vision.  They are called FLOATERS.  You can often see them when looking at a plain background, like a blank wall or blue rakesh.  Floaters are actually tiny clumps of gel or cells inside the VITREOUS, the clear jelly-like fluid that fills the inside of your eye.    While these objects look like they are in front of your eye, they are actually floating inside.  What you see are the shadows they cast on the RETINA, the nerve layer at the back of the eye that senses light and allows you to see.      POSTERIOR VITREOUS DETACHMENT    The appearance of new floaters may be alarming.  If you suddenly " develop new floaters, you should contact your eye care professional  right away.    The retina can tear if the shrinking vitreous pulls away from the wall of the eye.  This sometimes causes a small amount of bleeding in the eye that may appear as new floaters.    A torn retina is always a serious problem, since it can lead to a retinal detachment.  You should see your eye care professional as soon as possible if:     even one new floater appears suddenly;   you see sudden flashes of light;   you notice other symptoms, like the loss of side vision, or a curtain closes down in your vision        POSTERIOR VITREOUS DETACHMENT is more common for people who:     are nearsighted;   have had cataract surgery;   have had YAG laser surgery of the eye;   have had inflammation inside the eye;   are over age 60.      While some floaters may remain visible, many of them will fade over time and become less noticeable.  Even if you've had some floaters for years, you should have your eyes checked as soon as possible if you notice new ones.    FLASHING LIGHTS    When the vitreous gel rubs or pulls on the retina, you may see what look like flashing lights or lightning streaks.  These flashes can appear off and on for several weeks or months.      Some people experience flashes of light that appear as jagged lines or heat waves in both eyes, lasting 10-20 minutes.  These flashes are caused by a spasm of blood vessels in the brain, which is called a migraine.    If a headache follows these flashes, it's called a migraine headache.  If   no headache occurs, these flashes are called Ophthalmic or Ocular Migraine.            DIABETES AND THE EYE / DIABETIC RETINOPATHY    Diabetic retinopathy is a condition occurring in persons with diabetes, which causes progressive damage to the retina, the light sensitive lining at the back of the eye. It is a serious sight-threatening complication of diabetes.    Diabetic retinopathy is  the result of damage to the tiny blood vessels that nourish the retina. They leak blood and other fluids that cause swelling of retinal tissue and clouding of vision. The condition usually affects both eyes. The longer a person has diabetes, the more likely they will develop diabetic retinopathy. If left untreated, diabetic retinopathy can cause blindness.  There are two basic types of diabetic retinopathy:    Background or nonproliferative diabetic retinopathy (NPDR)  Nonproliferative diabetic retinopathy (NPDR) is the earliest stage of diabetic retinopathy. With this condition, damaged blood vessels in the retina begin to leak extra fluid and small amounts of blood into the eye. Sometimes, deposits of cholesterol or other fats from the blood may leak into the retina. Many people with diabetes have mild NPDR, which usually does not affect their vision. However, if their vision is affected, it is the result of macular edema and macular ischemia.    If vision is affected due to macular changes, a consult with a Retina Specialist may be advised.  This is an ophthalmologist that treats retina conditions, including diabetic retinopathy.     Proliferative diabetic retinopathy (PDR)  Proliferative diabetic retinopathy (PDR) mainly occurs when many of the blood vessels in the retina close, preventing enough blood flow. In an attempt to supply blood to the area where the original vessels closed, the retina responds by growing new blood vessels. This is called neovascularization. However, these new blood vessels are abnormal and do not supply the retina with proper blood flow. The new vessels are also often accompanied by scar tissue that may cause the retina to wrinkle or detach. PDR may cause more severe vision loss than NPDR because it can affect both central and peripheral vision.     A patient diagnosed with proliferative diabetic eye disease will be referred to a retinal specialist for consultation.    Often there are  no visual symptoms in the early stages of diabetic retinopathy. That is why our eye care professionals recommend that everyone with diabetes have a comprehensive dilated eye examination once a year. Early detection and treatment can limit the potential for significant vision loss from diabetic retinopathy.        Early Cataracts--not visually significant for surgery consultation.    What Are Cataracts?  A clear lens in the eye focuses light. This lets the eye see images sharply. With age, the lens slowly becomes cloudy. The cloudy lens is a cataract. A cataract scatters light and makes it hard for the eye to focus. Cataracts often form in both eyes. But one lens may cloud faster than the other.      The Aging of Your Lens    Your lens may cloud so slowly that you don`t notice any vision changes at first. But as the cataract gets worse, the eye has a harder time focusing. In early stages, glasses may help you see better. As the lens gets cloudier, your doctor may recommend surgery to restore your vision.

## 2020-08-07 NOTE — LETTER
August 7, 2020      Go Raza MD  [id number] [facility]  [place] [address]           [facility]  [address] [place] [address]  Phone: [phone]  Fax: [phone]          Patient: Merrill Cummings   MR Number: [id number]   YOB: 1955   Date of Visit: 8/7/2020       Dear Dr. Go Raza:    Thank you for referring Merrill Cummings to me for evaluation. Attached you will find relevant portions of my assessment and plan of care.    If you have questions, please do not hesitate to call me. I look forward to following Merrill Cummings along with you.    Sincerely,    CORRIE Brice, OD    Enclosure  CC:  No Recipients    If you would like to receive this communication electronically, please contact [email or web address] or [phone] to request more information on [employer] Link access.    For providers and/or their staff who would like to refer a patient to [facility], please contact us through our one-stop-shop provider referral line, [facility] , at [phone].    If you feel you have received this communication in error or would no longer like to receive these types of communications, please e-mail [email or web address]

## 2020-08-12 ENCOUNTER — OFFICE VISIT (OUTPATIENT)
Dept: DERMATOLOGY | Facility: CLINIC | Age: 65
End: 2020-08-12
Payer: MEDICARE

## 2020-08-12 VITALS — BODY MASS INDEX: 28.77 KG/M2 | WEIGHT: 201 LBS | HEIGHT: 70 IN | RESPIRATION RATE: 18 BRPM

## 2020-08-12 DIAGNOSIS — L57.0 ACTINIC KERATOSIS: ICD-10-CM

## 2020-08-12 DIAGNOSIS — I78.1 TELANGIECTASIA: ICD-10-CM

## 2020-08-12 DIAGNOSIS — L81.4 LENTIGINES: ICD-10-CM

## 2020-08-12 DIAGNOSIS — D22.9 MULTIPLE BENIGN NEVI: ICD-10-CM

## 2020-08-12 DIAGNOSIS — L73.8 SEBACEOUS HYPERPLASIA: ICD-10-CM

## 2020-08-12 DIAGNOSIS — L82.1 SEBORRHEIC KERATOSES: Primary | ICD-10-CM

## 2020-08-12 PROCEDURE — 17000 DESTRUCT PREMALG LESION: CPT | Mod: S$PBB,,, | Performed by: DERMATOLOGY

## 2020-08-12 PROCEDURE — 99214 PR OFFICE/OUTPT VISIT, EST, LEVL IV, 30-39 MIN: ICD-10-PCS | Mod: 25,S$PBB,, | Performed by: DERMATOLOGY

## 2020-08-12 PROCEDURE — 99999 PR PBB SHADOW E&M-EST. PATIENT-LVL III: ICD-10-PCS | Mod: PBBFAC,,, | Performed by: DERMATOLOGY

## 2020-08-12 PROCEDURE — 17003 DESTRUCTION, PREMALIGNANT LESIONS; SECOND THROUGH 14 LESIONS: ICD-10-PCS | Mod: S$PBB,,, | Performed by: DERMATOLOGY

## 2020-08-12 PROCEDURE — 99214 OFFICE O/P EST MOD 30 MIN: CPT | Mod: 25,S$PBB,, | Performed by: DERMATOLOGY

## 2020-08-12 PROCEDURE — 99213 OFFICE O/P EST LOW 20 MIN: CPT | Mod: PBBFAC,PO | Performed by: DERMATOLOGY

## 2020-08-12 PROCEDURE — 99999 PR PBB SHADOW E&M-EST. PATIENT-LVL III: CPT | Mod: PBBFAC,,, | Performed by: DERMATOLOGY

## 2020-08-12 PROCEDURE — 17000 PR DESTRUCTION(LASER SURGERY,CRYOSURGERY,CHEMOSURGERY),PREMALIGNANT LESIONS,FIRST LESION: ICD-10-PCS | Mod: S$PBB,,, | Performed by: DERMATOLOGY

## 2020-08-12 PROCEDURE — 17003 DESTRUCT PREMALG LES 2-14: CPT | Mod: S$PBB,,, | Performed by: DERMATOLOGY

## 2020-08-12 PROCEDURE — 17000 DESTRUCT PREMALG LESION: CPT | Mod: PBBFAC,PO | Performed by: DERMATOLOGY

## 2020-08-12 PROCEDURE — 17003 DESTRUCT PREMALG LES 2-14: CPT | Mod: PBBFAC,PO | Performed by: DERMATOLOGY

## 2020-08-12 NOTE — PROGRESS NOTES
Subjective:       Patient ID:  Merrill Cummings is a 65 y.o. male who presents for   Chief Complaint   Patient presents with    Follow-up    Lesion     64 y/o M presents for mole on left lower back.  He thinks it changed in shape, color, and is now scaly. Denies itching and bleeding.  Also there are red spots on face x years, burn with shaving, would like to have them evaluated     Denies history of NMSC  Denies family history of melanoma  History of pre cancers      Past Medical History:   Diagnosis Date    Colon polyp     Diabetes mellitus, type 2     H. pylori infection 02/01/2018    had EGD, undergoing treatment    HEARING LOSS     Hyperlipidemia     Hypertension     Kidney stone     Muscular dystrophy 2012    diagnosed neurology [facility] -wears leg braces    MVP (mitral valve prolapse)     Tubular adenoma of colon     Urolithiasis        Review of Systems   Constitutional: Negative for fever and chills.   Respiratory: Negative for cough and shortness of breath.    Skin: Positive for rash, dry skin and wears hat. Negative for daily sunscreen use.   Hematologic/Lymphatic: Bruises/bleeds easily (on ASA).        Objective:    Physical Exam   Constitutional: He appears well-developed and well-nourished.   Eyes: Lids are normal.    Neurological: He is alert and oriented to person, place, and time.   Psychiatric: He has a normal mood and affect.   Skin:   Areas Examined (abnormalities noted in diagram):   Scalp / Hair Palpated and Inspected  Head / Face Inspection Performed  Neck Inspection Performed  Chest / Axilla Inspection Performed  Abdomen Inspection Performed  Back Inspection Performed  RUE Inspected  LUE Inspection Performed  RLE Inspected  LLE Inspection Performed                           Diagram Legend     Erythematous scaling macule/papule c/w actinic keratosis       Vascular papule c/w angioma      Pigmented verrucoid papule/plaque c/w seborrheic keratosis      Yellow umbilicated papule c/w  sebaceous hyperplasia      Irregularly shaped tan macule c/w lentigo     1-2 mm smooth white papules consistent with Milia      Movable subcutaneous cyst with punctum c/w epidermal inclusion cyst      Subcutaneous movable cyst c/w pilar cyst      Firm pink to brown papule c/w dermatofibroma      Pedunculated fleshy papule(s) c/w skin tag(s)      Evenly pigmented macule c/w junctional nevus     Mildly variegated pigmented, slightly irregular-bordered macule c/w mildly atypical nevus      Flesh colored to evenly pigmented papule c/w intradermal nevus       Pink pearly papule/plaque c/w basal cell carcinoma      Erythematous hyperkeratotic cursted plaque c/w SCC      Surgical scar with no sign of skin cancer recurrence      Open and closed comedones      Inflammatory papules and pustules      Verrucoid papule consistent consistent with wart     Erythematous eczematous patches and plaques     Dystrophic onycholytic nail with subungual debris c/w onychomycosis     Umbilicated papule    Erythematous-base heme-crusted tan verrucoid plaque consistent with inflamed seborrheic keratosis     Erythematous Silvery Scaling Plaque c/w Psoriasis     See annotation      Assessment / Plan:        Seborrheic keratoses  These are benign inherited growths without a malignant potential. Reassurance given to patient. No treatment is necessary.      Sebaceous hyperplasia  This is a common condition representing benign enlargement of the sebaceous lobule. It typically occurs in adulthood. Reassurance given to patient.     Actinic keratosis  Cryosurgery Procedure Note    Verbal consent from the patient is obtained and the patient is aware of the precancerous quality and need for treatment of these lesions. Liquid nitrogen cryosurgery is applied to the 5 actinic keratoses, as detailed in the physical exam, to produce a freeze injury. The patient is aware that blisters may form and is instructed on wound care with gentle cleansing and use of  vaseline ointment to keep moist until healed. The patient is instructed to call if lesions do not completely resolve.    Telangiectasia  Reassurance    Lentigines  These are benign hyperpigmented sun induced lesions. Daily sun protection will reduce the number of new lesions.    Multiple benign nevi  Reassurance that his nevi appear benign with regular and consistent pigment pattern on dermoscopy             Follow up in about 1 year (around 8/12/2021).

## 2020-08-12 NOTE — PROGRESS NOTES
Subjective:       Patient ID:  Merrill Cummings is a 65 y.o. male who presents for   Chief Complaint   Patient presents with    Follow-up    Lesion     66 y/o male last seen 3-5-18      Review of Systems   Skin: Negative for sensitivity to antibiotic ointment and sensitivity to bandage adhesive.   Hematologic/Lymphatic: Bruises/bleeds easily.        Objective:    Physical Exam       Diagram Legend     Erythematous scaling macule/papule c/w actinic keratosis       Vascular papule c/w angioma      Pigmented verrucoid papule/plaque c/w seborrheic keratosis      Yellow umbilicated papule c/w sebaceous hyperplasia      Irregularly shaped tan macule c/w lentigo     1-2 mm smooth white papules consistent with Milia      Movable subcutaneous cyst with punctum c/w epidermal inclusion cyst      Subcutaneous movable cyst c/w pilar cyst      Firm pink to brown papule c/w dermatofibroma      Pedunculated fleshy papule(s) c/w skin tag(s)      Evenly pigmented macule c/w junctional nevus     Mildly variegated pigmented, slightly irregular-bordered macule c/w mildly atypical nevus      Flesh colored to evenly pigmented papule c/w intradermal nevus       Pink pearly papule/plaque c/w basal cell carcinoma      Erythematous hyperkeratotic cursted plaque c/w SCC      Surgical scar with no sign of skin cancer recurrence      Open and closed comedones      Inflammatory papules and pustules      Verrucoid papule consistent consistent with wart     Erythematous eczematous patches and plaques     Dystrophic onycholytic nail with subungual debris c/w onychomycosis     Umbilicated papule    Erythematous-base heme-crusted tan verrucoid plaque consistent with inflamed seborrheic keratosis     Erythematous Silvery Scaling Plaque c/w Psoriasis     See annotation      Assessment / Plan:        There are no diagnoses linked to this encounter.         No follow-ups on file.

## 2020-08-17 ENCOUNTER — TELEPHONE (OUTPATIENT)
Dept: GASTROENTEROLOGY | Facility: CLINIC | Age: 65
End: 2020-08-17

## 2020-08-17 DIAGNOSIS — Z01.812 PRE-PROCEDURE LAB EXAM: ICD-10-CM

## 2020-09-09 RX ORDER — LOSARTAN POTASSIUM 100 MG/1
TABLET ORAL
Qty: 90 TABLET | Refills: 3 | Status: SHIPPED | OUTPATIENT
Start: 2020-09-09 | End: 2021-11-21

## 2020-09-17 ENCOUNTER — LAB VISIT (OUTPATIENT)
Dept: LAB | Facility: HOSPITAL | Age: 65
End: 2020-09-17
Attending: FAMILY MEDICINE
Payer: MEDICARE

## 2020-09-17 DIAGNOSIS — E11.9 TYPE 2 DIABETES MELLITUS WITHOUT COMPLICATION, WITHOUT LONG-TERM CURRENT USE OF INSULIN: ICD-10-CM

## 2020-09-17 LAB
ESTIMATED AVG GLUCOSE: 163 MG/DL (ref 68–131)
HBA1C MFR BLD HPLC: 7.3 % (ref 4–5.6)

## 2020-09-17 PROCEDURE — 83036 HEMOGLOBIN GLYCOSYLATED A1C: CPT

## 2020-09-17 PROCEDURE — 36415 COLL VENOUS BLD VENIPUNCTURE: CPT | Mod: PO

## 2020-09-25 ENCOUNTER — TELEPHONE (OUTPATIENT)
Dept: FAMILY MEDICINE | Facility: CLINIC | Age: 65
End: 2020-09-25

## 2020-09-25 DIAGNOSIS — E11.9 TYPE 2 DIABETES MELLITUS WITHOUT COMPLICATION, WITHOUT LONG-TERM CURRENT USE OF INSULIN: Primary | ICD-10-CM

## 2020-09-25 RX ORDER — EMPAGLIFLOZIN 10 MG/1
10 TABLET, FILM COATED ORAL DAILY
Qty: 90 TABLET | Refills: 1 | Status: SHIPPED | OUTPATIENT
Start: 2020-09-25 | End: 2021-05-10

## 2020-09-25 NOTE — TELEPHONE ENCOUNTER
Patient requested Jardiance instead of Januvia.  I think this would be reasonable.  I sent a prescription for Jardiance.  He still takes the metformin with this.

## 2020-09-25 NOTE — TELEPHONE ENCOUNTER
----- Message from Go Raza MD sent at 9/24/2020  5:03 PM CDT -----  Sugar still borderline.  Recommend add Januvia 100 mg daily.  Continue other medication as currently.  Recheck hemoglobin A1c, urine microalbumin in January. My nurse will contact you to arrange.  Thanks,  Dr. Raza

## 2020-09-25 NOTE — TELEPHONE ENCOUNTER
----- Message from Rimma Raymond sent at 9/25/2020  2:33 PM CDT -----  Regarding: result  Contact: [phone]  Would like to consult with the nurse, patient is returning the nurse call, please call back thanks sj   .Type:  Patient Returning Call    Who Called [email or web address]  Who Left Message for Patient:  Does the patient know what this is regarding?:result  Would the patient rather a call back or a response via [facility]? callback  Best Call Back Number: [phone]  Additional Information:

## 2020-09-29 ENCOUNTER — CLINICAL SUPPORT (OUTPATIENT)
Dept: FAMILY MEDICINE | Facility: CLINIC | Age: 65
End: 2020-09-29
Payer: MEDICARE

## 2020-09-29 DIAGNOSIS — Z01.812 PRE-PROCEDURE LAB EXAM: ICD-10-CM

## 2020-09-29 PROCEDURE — U0003 INFECTIOUS AGENT DETECTION BY NUCLEIC ACID (DNA OR RNA); SEVERE ACUTE RESPIRATORY SYNDROME CORONAVIRUS 2 (SARS-COV-2) (CORONAVIRUS DISEASE [COVID-19]), AMPLIFIED PROBE TECHNIQUE, MAKING USE OF HIGH THROUGHPUT TECHNOLOGIES AS DESCRIBED BY CMS-2020-01-R: HCPCS

## 2020-09-30 LAB — SARS-COV-2 RNA RESP QL NAA+PROBE: NOT DETECTED

## 2020-10-01 ENCOUNTER — PATIENT MESSAGE (OUTPATIENT)
Dept: OTHER | Facility: OTHER | Age: 65
End: 2020-10-01

## 2020-10-01 ENCOUNTER — ANESTHESIA EVENT (OUTPATIENT)
Dept: ENDOSCOPY | Facility: HOSPITAL | Age: 65
End: 2020-10-01
Payer: MEDICARE

## 2020-10-01 NOTE — ANESTHESIA PREPROCEDURE EVALUATION
10/01/2020  Merrill Cummigns is a 65 y.o., male.    Anesthesia Evaluation    I have reviewed the Patient Summary Reports.    I have reviewed the Nursing Notes. I have reviewed the NPO Status.   I have reviewed the Medications.     Review of Systems  Anesthesia Hx:  No problems with previous Anesthesia Denies Hx of Anesthetic complications    Social:  Non-Smoker    Cardiovascular:   Hypertension Denies MI.  Denies CAD.    Denies CABG/stent.   Denies Angina.    Pulmonary:   Denies COPD.  Denies Asthma.  Denies Recent URI. Sleep Apnea, CPAP    Renal/:   Chronic Renal Disease renal calculi    Hepatic/GI:   GERD Denies Liver Disease.    Neurological:   Denies TIA. Denies CVA. Neuromuscular Disease,  Denies Seizures. Charcot Mague Tooth muscular atrophy    Muscular dystrophy   Endocrine:   Diabetes, well controlled, type 2 Denies Hypothyroidism.    Psych:   Denies Psychiatric History.          Physical Exam  General:  Well nourished    Airway/Jaw/Neck:  Airway Findings: Mouth Opening: Normal Tongue: Normal  General Airway Assessment: Adult, Good  Mallampati: II  Improves to II with phonation.  TM Distance: 4-6 cm      Dental:  Dental Findings: In tact   Chest/Lungs:  Chest/Lungs Findings: Clear to auscultation, Normal Respiratory Rate     Heart/Vascular:  Heart Findings: Rate: Normal  Rhythm: Regular Rhythm  Sounds: Normal  Heart murmur: negative       Mental Status:  Mental Status Findings:  Cooperative, Alert and Oriented         Anesthesia Plan  Type of Anesthesia, risks & benefits discussed:  Anesthesia Type:  general  Patient's Preference:   Intra-op Monitoring Plan: standard ASA monitors  Intra-op Monitoring Plan Comments:   Post Op Pain Control Plan:   Post Op Pain Control Plan Comments:   Induction:   IV  Beta Blocker:  Patient is not currently on a Beta-Blocker (No further documentation required).        Informed Consent: Patient understands risks and agrees with Anesthesia plan.  Questions answered. Anesthesia consent signed with patient.  ASA Score: 3     Day of Surgery Review of History & Physical: I have interviewed and examined the patient. I have reviewed the patient's H&P dated:  There are no significant changes.  H&P update referred to the surgeon.  H&P completed by Anesthesiologist.       Ready For Surgery From Anesthesia Perspective.

## 2020-10-02 ENCOUNTER — HOSPITAL ENCOUNTER (OUTPATIENT)
Facility: HOSPITAL | Age: 65
Discharge: HOME | End: 2020-10-02
Attending: INTERNAL MEDICINE | Admitting: INTERNAL MEDICINE
Payer: MEDICARE

## 2020-10-02 ENCOUNTER — ANESTHESIA (OUTPATIENT)
Dept: ENDOSCOPY | Facility: HOSPITAL | Age: 65
End: 2020-10-02
Payer: MEDICARE

## 2020-10-02 VITALS
RESPIRATION RATE: 16 BRPM | HEART RATE: 76 BPM | WEIGHT: 200 LBS | HEIGHT: 70 IN | BODY MASS INDEX: 28.63 KG/M2 | OXYGEN SATURATION: 95 % | DIASTOLIC BLOOD PRESSURE: 82 MMHG | TEMPERATURE: 98 F | SYSTOLIC BLOOD PRESSURE: 142 MMHG

## 2020-10-02 DIAGNOSIS — Z86.010 HX OF COLONIC POLYPS: ICD-10-CM

## 2020-10-02 PROBLEM — Z86.0100 HX OF COLONIC POLYPS: Status: ACTIVE | Noted: 2020-10-02

## 2020-10-02 LAB — GLUCOSE SERPL-MCNC: 108 MG/DL (ref 70–110)

## 2020-10-02 PROCEDURE — 37000008 HC ANESTHESIA 1ST 15 MINUTES: Mod: PO | Performed by: INTERNAL MEDICINE

## 2020-10-02 PROCEDURE — D9220A PRA ANESTHESIA: ICD-10-PCS | Mod: CRNA,,, | Performed by: NURSE ANESTHETIST, CERTIFIED REGISTERED

## 2020-10-02 PROCEDURE — D9220A PRA ANESTHESIA: Mod: ANES,,, | Performed by: ANESTHESIOLOGY

## 2020-10-02 PROCEDURE — G0105 COLORECTAL SCRN; HI RISK IND: HCPCS | Mod: ,,, | Performed by: INTERNAL MEDICINE

## 2020-10-02 PROCEDURE — 25000003 PHARM REV CODE 250: Mod: PO | Performed by: NURSE ANESTHETIST, CERTIFIED REGISTERED

## 2020-10-02 PROCEDURE — 63600175 PHARM REV CODE 636 W HCPCS: Mod: PO | Performed by: INTERNAL MEDICINE

## 2020-10-02 PROCEDURE — D9220A PRA ANESTHESIA: Mod: CRNA,,, | Performed by: NURSE ANESTHETIST, CERTIFIED REGISTERED

## 2020-10-02 PROCEDURE — G0105 COLORECTAL SCRN; HI RISK IND: ICD-10-PCS | Mod: ,,, | Performed by: INTERNAL MEDICINE

## 2020-10-02 PROCEDURE — D9220A PRA ANESTHESIA: ICD-10-PCS | Mod: ANES,,, | Performed by: ANESTHESIOLOGY

## 2020-10-02 PROCEDURE — 82962 GLUCOSE BLOOD TEST: CPT | Mod: PO | Performed by: INTERNAL MEDICINE

## 2020-10-02 PROCEDURE — 37000009 HC ANESTHESIA EA ADD 15 MINS: Mod: PO | Performed by: INTERNAL MEDICINE

## 2020-10-02 PROCEDURE — G0121 COLON CA SCRN NOT HI RSK IND: HCPCS | Mod: PO | Performed by: INTERNAL MEDICINE

## 2020-10-02 PROCEDURE — G0105 COLORECTAL SCRN; HI RISK IND: HCPCS | Mod: PO | Performed by: INTERNAL MEDICINE

## 2020-10-02 PROCEDURE — 63600175 PHARM REV CODE 636 W HCPCS: Mod: PO | Performed by: NURSE ANESTHETIST, CERTIFIED REGISTERED

## 2020-10-02 RX ORDER — PROPOFOL 10 MG/ML
VIAL (ML) INTRAVENOUS
Status: DISCONTINUED | OUTPATIENT
Start: 2020-10-02 | End: 2020-10-02

## 2020-10-02 RX ORDER — LIDOCAINE HCL/PF 100 MG/5ML
SYRINGE (ML) INTRAVENOUS
Status: DISCONTINUED | OUTPATIENT
Start: 2020-10-02 | End: 2020-10-02

## 2020-10-02 RX ORDER — SODIUM CHLORIDE, SODIUM LACTATE, POTASSIUM CHLORIDE, CALCIUM CHLORIDE 600; 310; 30; 20 MG/100ML; MG/100ML; MG/100ML; MG/100ML
INJECTION, SOLUTION INTRAVENOUS CONTINUOUS
Status: DISCONTINUED | OUTPATIENT
Start: 2020-10-02 | End: 2020-10-02 | Stop reason: HOSPADM

## 2020-10-02 RX ORDER — SODIUM CHLORIDE 0.9 % (FLUSH) 0.9 %
10 SYRINGE (ML) INJECTION
Status: DISCONTINUED | OUTPATIENT
Start: 2020-10-02 | End: 2020-10-02 | Stop reason: HOSPADM

## 2020-10-02 RX ORDER — PROPOFOL 10 MG/ML
VIAL (ML) INTRAVENOUS CONTINUOUS PRN
Status: DISCONTINUED | OUTPATIENT
Start: 2020-10-02 | End: 2020-10-02

## 2020-10-02 RX ADMIN — PROPOFOL 150 MCG/KG/MIN: 10 INJECTION, EMULSION INTRAVENOUS at 11:10

## 2020-10-02 RX ADMIN — SODIUM CHLORIDE, SODIUM LACTATE, POTASSIUM CHLORIDE, AND CALCIUM CHLORIDE: .6; .31; .03; .02 INJECTION, SOLUTION INTRAVENOUS at 11:10

## 2020-10-02 RX ADMIN — LIDOCAINE HYDROCHLORIDE 100 MG: 20 INJECTION, SOLUTION INTRAVENOUS at 11:10

## 2020-10-02 RX ADMIN — PROPOFOL 100 MG: 10 INJECTION, EMULSION INTRAVENOUS at 11:10

## 2020-10-02 NOTE — TRANSFER OF CARE
"Anesthesia Transfer of Care Note    Patient: Merrill Cummings    Procedure(s) Performed: Procedure(s) (LRB):  COLONOSCOPY (N/A)    Patient location: PACU    Anesthesia Type: general    Transport from OR: Transported from OR on 2-3 L/min O2 by NC with adequate spontaneous ventilation    Post pain: adequate analgesia    Post assessment: no apparent anesthetic complications and tolerated procedure well    Post vital signs: stable    Level of consciousness: awake, alert and oriented    Nausea/Vomiting: no nausea/vomiting    Complications: none    Transfer of care protocol was followed      Last vitals:   Visit Vitals  /74 (BP Location: Right arm, Patient Position: Lying)   Pulse 78   Temp 36.8 °C (98.2 °F) (Skin)   Resp 18   Ht 5' 10" (1.778 m)   Wt 90.7 kg (200 lb)   SpO2 98%   BMI 28.70 kg/m²     "

## 2020-10-02 NOTE — DISCHARGE INSTRUCTIONS
Recovery After Procedural Sedation (Adult)   You have been given medicine by vein to make you sleep during your surgery. This may have included both a pain medicine and sleeping medicine. Most of the effects have worn off. But you may still have some drowsiness for the next 6 to 8 hours.  Home care  Follow these guidelines when you get home:  · For the next 8 hours, you should be watched by a responsible adult. This person should make sure your condition is not getting worse.  · Don't drink any alcohol for the next 24 hours.  · Don't drive, operate dangerous machinery, or make important business or personal decisions during the next 24 hours.  · To prevent injury or falls, use caution when standing and walking for at least 24 hours after your procedure.  Note: Your healthcare provider may tell you not to take any medicine by mouth for pain or sleep in the next 4 hours. These medicines may react with the medicines you were given in the hospital. This could cause a much stronger response than usual.  Follow-up care  Follow up with your healthcare provider if you are not alert and back to your usual level of activity within 12 hours.  When to seek medical advice  Call your healthcare provider right away if any of these occur:  · Drowsiness gets worse  · Weakness or dizziness gets worse  · Repeated vomiting  · You can't be awakened  · Fever  · New rash  [employer] last reviewed this educational content on 9/1/2019  © 0169-3438 [employer]. [address] [place] [address]. All rights reserved. This information is not intended as a substitute for professional medical care. Always follow your healthcare professional's instructions.        High-Fiber Diet  Fiber is in fruits, vegetables, cereals, and grains. Fiber passes through your body undigested. A high-fiber diet helps food move through your intestinal tract. The added bulk is helpful in preventing constipation. In people with diverticulosis,  fiber helps clean out the pouches along the colon wall. It also prevents new pouches from forming. A high-fiber diet reduces the risk of colon cancer. It also lowers blood cholesterol and prevents high blood sugar in people with diabetes.    The fiber-rich foods listed below should be part of your diet. If you are not used to high-fiber foods, start with 1 or 2 foods from this list. Every 3 to 4 days add a new one to your diet. Do this until you are eating 4 high-fiber foods per day. This should give you 20 to 35 grams of fiber a day. It is also important to drink a lot of water when you are on this diet. You should have 6 to 8 glasses of water a day. Water makes the fiber swell and increases the benefit.  Foods high in dietary fiber  The following foods are high in dietary fiber:  · Breads. Breads made with 100% whole-wheat flour; agnes, wheat, or rye crackers; whole-grain tortillas, bran muffins.  · Cereals. Whole-grain and bran cereals with bran (shredded wheat, wheat flakes, raisin bran, corn bran); oatmeal, rolled oats, granola, and brown rice.  · Fruits. Fresh fruits and their edible skins (pears, prunes, raisins, berries, apples, and apricots); bananas, citrus fruit, mangoes, pineapple; and prune juice.  · Nuts. Any nuts and seeds.  · Vegetables. Best served raw or lightly cooked. All types, especially: green peas, celery, eggplant, potatoes, spinach, broccoli, [place] sprouts, winter squash, carrots, cauliflower, soybeans, lentils, and fresh and dried beans of all kinds.  · Other. Popcorn, any spices.  Date Last Reviewed: 8/1/2016 © 2000-2017 [employer]. [address] [place] [address], [place] [address]. All rights reserved. This information is not intended as a substitute for professional medical care. Always follow your healthcare professional's instructions.      Discharge Instructions: After Your Surgery/Procedure  Youve just had surgery. During surgery you were given medicine called  "anesthesia to keep you relaxed and free of pain. After surgery you may have some pain or nausea. This is common. Here are some tips for feeling better and getting well after surgery.     Stay on schedule with your medication.   Going home  Your doctor or nurse will show you how to take care of yourself when you go home. He or she will also answer your questions. Have an adult family member or friend drive you home.      For your safety we recommend these precaution for the first 24 hours after your procedure:  · Do not drive or use heavy equipment.  · Do not make important decisions or sign legal papers.  · Do not drink alcohol.  · Have someone stay with you, if needed. He or she can watch for problems and help keep you safe.  · Your concentration, balance, coordination, and judgement may be impaired for many hours after anesthesia.  Use caution when ambulating or standing up.     · You may feel weak and "washed out" after anesthesia and surgery.      Subtle residual effects of general anesthesia or sedation with regional / local anesthesia can last more than 24 hours.  Rest for the remainder of the day or longer if your Doctor/Surgeon has advised you to do so.  Although you may feel normal within the first 24 hours, your reflexes and mental ability may be impaired without you realizing it.  You may feel dizzy, lightheaded or sleepy for 24 hours or longer.      Be sure to go to all follow-up visits with your doctor. And rest after your surgery for as long as your doctor tells you to.  Coping with pain  If you have pain after surgery, pain medicine will help you feel better. Take it as told, before pain becomes severe. Also, ask your doctor or pharmacist about other ways to control pain. This might be with heat, ice, or relaxation. And follow any other instructions your surgeon or nurse gives you.  Tips for taking pain medicine  To get the best relief possible, remember these points:  · Pain medicines can upset your " stomach. Taking them with a little food may help.  · Most pain relievers taken by mouth need at least 20 to 30 minutes to start to work.  · Taking medicine on a schedule can help you remember to take it. Try to time your medicine so that you can take it before starting an activity. This might be before you get dressed, go for a walk, or sit down for dinner.  · Constipation is a common side effect of pain medicines. Call your doctor before taking any medicines such as laxatives or stool softeners to help ease constipation. Also ask if you should skip any foods. Drinking lots of fluids and eating foods such as fruits and vegetables that are high in fiber can also help. Remember, do not take laxatives unless your surgeon has prescribed them.  · Drinking alcohol and taking pain medicine can cause dizziness and slow your breathing. It can even be deadly. Do not drink alcohol while taking pain medicine.  · Pain medicine can make you react more slowly to things. Do not drive or run machinery while taking pain medicine.  Your health care provider may tell you to take acetaminophen to help ease your pain. Ask him or her how much you are supposed to take each day. Acetaminophen or other pain relievers may interact with your prescription medicines or other over-the-counter (OTC) drugs. Some prescription medicines have acetaminophen and other ingredients. Using both prescription and OTC acetaminophen for pain can cause you to overdose. Read the labels on your OTC medicines with care. This will help you to clearly know the list of ingredients, how much to take, and any warnings. It may also help you not take too much acetaminophen. If you have questions or do not understand the information, ask your pharmacist or health care provider to explain it to you before you take the OTC medicine.  Managing nausea  Some people have an upset stomach after surgery. This is often because of anesthesia, pain, or pain medicine, or the stress  of surgery. These tips will help you handle nausea and eat healthy foods as you get better. If you were on a special food plan before surgery, ask your doctor if you should follow it while you get better. These tips may help:  · Do not push yourself to eat. Your body will tell you when to eat and how much.  · Start off with clear liquids and soup. They are easier to digest.  · Next try semi-solid foods, such as mashed potatoes, applesauce, and gelatin, as you feel ready.  · Slowly move to solid foods. Dont eat fatty, rich, or spicy foods at first.  · Do not force yourself to have 3 large meals a day. Instead eat smaller amounts more often.  · Take pain medicines with a small amount of solid food, such as crackers or toast, to avoid nausea.     Call your surgeon if  · You still have pain an hour after taking medicine. The medicine may not be strong enough.  · You feel too sleepy, dizzy, or groggy. The medicine may be too strong.  · You have side effects like nausea, vomiting, or skin changes, such as rash, itching, or hives.       If you have obstructive sleep apnea  You were given anesthesia medicine during surgery to keep you comfortable and free of pain. After surgery, you may have more apnea spells because of this medicine and other medicines you were given. The spells may last longer than usual.   At home:  · Keep using the continuous positive airway pressure (CPAP) device when you sleep. Unless your health care provider tells you not to, use it when you sleep, day or night. CPAP is a common device used to treat obstructive sleep apnea.  · Talk with your provider before taking any pain medicine, muscle relaxants, or sedatives. Your provider will tell you about the possible dangers of taking these medicines.  © 3608-4306 [employer]. [address] [place] [address], [place] [address]. All rights reserved. This information is not intended as a substitute for professional medical care. Always follow your  healthcare professional's instructions.

## 2020-10-02 NOTE — H&P
History & Physical - Short Stay  Gastroenterology      SUBJECTIVE:     Procedure: Colonoscopy    Chief Complaint/Indication for Procedure: Surveillance. Hx of colon polyps.    History of Present Illness:  Asymptomatic    Office Visit    6/17/2020                                                                                                                     [facility]     Go Raza MD  Family Medicine  Type 2 diabetes mellitus without complication, without long-term current use of insulin +4 more  Dx  Annual Exam  Reason for Visit   Progress Notes  Go Raza MD (Physician)   Family Medicine   6/17/2020  8:00 AM   Signed  Expand All Collapse All    Follow-up diabetes.  States hemoglobin A1c 7.5 at his cardiologist office this past week.  They recommended that he add Jardiance, but he is concerned about doing this and wants to try diet 1st.  He is compliant with his metformin.  He has gained some weight.  Hyperlipidemia states recent laboratory was better.  We do not have copy.  Blood pressure is elevated.  He is also due for colonoscopy.           Merrill was seen today for annual exam.     Diagnoses and all orders for this visit:     Type 2 diabetes mellitus without complication, without long-term current use of insulin  -     Ambulatory referral/consult to Optometry; Future  -     Hemoglobin A1C; Future     Hyperlipidemia associated with type 2 diabetes mellitus     Hypertension associated with diabetes     History of adenomatous polyp of colon  -     Case request GI: COLONOSCOPY     Screening for colon cancer  -     Case request GI: COLONOSCOPY     Other orders  -     losartan (COZAAR) 100 MG tablet; Take 1 tablet (100 mg total) by mouth once daily.  -     Pneumococcal Polysaccharide Vaccine (23 Valent) (SQ/IM)        Increase losartan.  I believe addition of Jardiance would be reasonable however he wants to wait until his next A1c.  Will recheck in hemoglobin A1c in 3  months.  Request note from his cardiologist as well as copy of the recent laboratory reports.  Recheck blood pressure with nurse in 4 weeks.            See last Colonoscopy 4/15/2015:  Indications:         Screening for colorectal malignant neoplasm, Last                        colonoscopy: 2004 or 05 ([facility], in [place]?).                        Father possibly had colon cancer.   Impression:          - Two 1 to 3 mm polyps in the proximal ascending                        colon. Resected and retrieved.                        - Internal hemorrhoids.                        - The examination was otherwise normal.                        - The examined portion of the ileum was normal.   Recommendation:      - Discharge patient to home.                        - Await pathology results.                        - Repeat colonoscopy in 5 years for surveillance.                        - High fiber diet.                        - Take a PROBIOTIC, such as a carton of GREEK YOGURT                        (Chobani or Oikos, or Activia or Dannon); or tablets                        of ALIGN or CULTURELLE or CLARITZA-Q (all                        non-prescription), every day for a month.                        - Continue present medications.                        - Patient has a contact number available for                        emergencies. The signs and symptoms of potential                        delayed complications were discussed with the                        patient. Return to normal activities tomorrow.                        Written discharge instructions were provided to the                        patient.                        - Return to normal activities tomorrow.   Vernon Houser MD   4/15/2015    SPECIMEN  1) Ascending colon polyp.  FINAL PATHOLOGIC DIAGNOSIS  TUBULAR ADENOMA          PTA Medications   Medication Sig    carvediloL (COREG) 12.5 MG tablet Take 12.5 mg by mouth 2 (two) times daily.      "co-enzyme Q-10 30 mg capsule Take 100 mg by mouth once daily. Qunol 100 mg    empagliflozin (JARDIANCE) 10 mg tablet Take 1 tablet (10 mg total) by mouth once daily.    FLAXSEED ORAL Take by mouth. Flaxseed Ground-2 tablespoons daily    hydrochlorothiazide (HYDRODIURIL) 25 MG tablet Take 25 mg by mouth once daily.    losartan (COZAAR) 100 MG tablet TAKE 1 TABLET BY MOUTH EVERY DAY    metformin (GLUCOPHAGE) 1000 MG tablet Take 1,000 mg by mouth 2 (two) times daily with meals.     multivitamin with minerals tablet Take 1 tablet by mouth once daily.    rosuvastatin (CRESTOR) 20 MG tablet Take 1 tablet (20 mg total) by mouth once daily.    blood sugar diagnostic (BLOOD GLUCOSE TEST) Strp by Misc.(Non-Drug; Combo Route) route. One touch Ultra Blue    ONETOUCH DELICA LANCETS 30 gauge Misc     pomegranate xt/pomegranat seed (POMEGRANATE ORAL) Take 8 oz by mouth once daily.       Review of patient's allergies indicates:   Allergen Reactions    Ditropan [oxybutynin chloride] Other (See Comments)     Severe reflux    Nexium [esomeprazole magnesium] Rash        Past Medical History:   Diagnosis Date    Colon polyp     Diabetes mellitus, type 2     H. pylori infection 02/01/2018    had EGD, undergoing treatment    HEARING LOSS     Hyperlipidemia     Hypertension     Kidney stone     Muscular dystrophy 2012    diagnosed neurology [facility] -wears leg braces    MVP (mitral valve prolapse)     LAQUITA (obstructive sleep apnea)     resolved with weight loss- no longer requires cpap    Tubular adenoma of colon     Urolithiasis      Past Surgical History:   Procedure Laterality Date    ANKLE FRACTURE SURGERY      CIRCUMCISION      COLONOSCOPY  ~2004 or 05  ([place]  [place]?)    "Nothing"    COLONOSCOPY W/ POLYPECTOMY  04/15/2015    [facility].   Two 1 to 3 mm polyps in the proximal ascending colon.  TUBULAR ADENOMA.   Internal hemorrhoids.  Otherwise normalcolon and TI.    ESOPHAGOGASTRODUODENOSCOPY      " "EXTRACORPOREAL SHOCK WAVE LITHOTRIPSY  , 2004    x 2 - dr [facility]ivo joshi ([facility])    FACIAL LACERATIONS REPAIR      as a child    MULTIPLE TOOTH EXTRACTIONS      ORIF ankle fracture  1988    PROSTATE BIOPSY      X2     Family History   Problem Relation Age of Onset    Diabetes Father     Hypertension Father     Muscular dystrophy Father     Prostate cancer Father         did no0t die of it    Lung cancer Father         prostate, did not die of/lung,  of    Glaucoma Father     Macular degeneration Father     Hypertension Mother     Glaucoma Mother     Macular degeneration Mother     Scoliosis Sister     Stroke Sister         massive - SUDDEN DEATH    Pulmonary embolism Paternal Grandfather     Prostate cancer Paternal Grandfather      Social History     Tobacco Use    Smoking status: Never Smoker    Smokeless tobacco: Never Used   Substance Use Topics    Alcohol use: No     Frequency: Never     Drinks per session: 1 or 2     Binge frequency: Never    Drug use: No         OBJECTIVE:     Vital Signs (Most Recent)  Temp: 98.2 °F (36.8 °C) (10/02/20 1121)  Pulse: 78 (10/02/20 1121)  Resp: 18 (10/02/20 1121)  BP: 131/74 (10/02/20 1121)  SpO2: 98 % (10/02/20 1121)    Physical Exam:  :Ht: 5' 10" (177.8 cm)   Wt: 90.7 kg (200 lb)   BMI: 28.70 kg/m²                                                          GENERAL:  Comfortable, in no acute distress.                                 HEENT EXAM:  Nonicteric.  No adenopathy.  Oropharynx is clear.               NECK:  Supple.                                                               LUNGS:  Clear.                                                               CARDIAC:  Regular rate and rhythm.  S1, S2.  No murmur.                      ABDOMEN:  Soft, positive bowel sounds, nontender.  No hepatosplenomegaly or masses.  No rebound or guarding.                                             EXTREMITIES:  No edema.     MENTAL STATUS:  Alert and " oriented.    ASSESSMENT/PLAN:     Assessment: Colorectal cancer screening    Plan: Colonoscopy    Anesthesia Plan:   MAC / General Anaesthesia    ASA Grade: ASA 2 - Patient with mild systemic disease with no functional limitations    MALLAMPATI SCORE: I (soft palate, uvula, fauces, and tonsillar pillars visible)

## 2020-10-02 NOTE — ANESTHESIA POSTPROCEDURE EVALUATION
Anesthesia Post Evaluation    Patient: Merrill Cummings    Procedure(s) Performed: Procedure(s) (LRB):  COLONOSCOPY (N/A)    Final Anesthesia Type: general    Patient location during evaluation: PACU  Patient participation: Yes- Able to Participate  Level of consciousness: awake and alert and oriented  Post-procedure vital signs: reviewed and stable  Pain management: adequate  Airway patency: patent    PONV status at discharge: No PONV  Anesthetic complications: no      Cardiovascular status: blood pressure returned to baseline  Respiratory status: unassisted, spontaneous ventilation and room air  Hydration status: euvolemic  Follow-up not needed.          Vitals Value Taken Time   /82 10/02/20 1242   Temp 36.7 °C (98 °F) 10/02/20 1242   Pulse 76 10/02/20 1242   Resp 16 10/02/20 1242   SpO2 95 % 10/02/20 1242         Event Time   Out of Recovery 12:54:00         Pain/Cali Score: Cali Score: 10 (10/2/2020 12:35 PM)

## 2020-10-02 NOTE — [employer] PATIENT INSTRUCTIONS
Discharge Summary/Instructions for after Colonoscopy without   Biopsy/Polypectomy  Merrill Cummings    Friday, October 2, 2020  Vernon Houser MD  RESTRICTIONS ON ACTIVITY:  - Do not drive a car or operate machinery until the day after the procedure.      - The following day: return to full activity including work.  - For  3 days: No heavy lifting, straining or running.  - Diet: You may eat solid foods, but no gassy foods (i.e. beans, broccoli,   cabbage, etc).  TREATMENT FOR COMMON SIDE EFFECTS:  - Mild abdominal pain and bloating or excessive gas: rest, eat lightly and   use a heating pad.  SYMPTOMS TO WATCH FOR AND REPORT TO YOUR PHYSICIAN:  1. Severe abdominal pain.  2. Fever within 24 hours after a procedure.  3. A large amount of rectal bleeding. (A small amount of blood from the   rectum is not serious, especially if hemorrhoids are present.  3.  Because air was put into your colon during the procedure, expelling   large amounts of air from your rectum is normal.  4.  You may not have a bowel movement for 1-3 days because of the   colonoscopy prep.  This is normal.  5.  Call immediately if you notice any of the following:   Chills and/or fever over 101   Persistent vomiting   Severe abdominal pain, other than gas cramps   Severe chest pain   Black, tarry stools   Any bleeding - exceeding one tablespoon  Your doctor recommends these additional instructions:  Eat a high fiber diet.   Take a fiber supplement, for example Citrucel, Fibercon, Konsyl or   Metamucil.   Continue your present medications.   Your physician has recommended a repeat colonoscopy in five years for   surveillance.   You have a contact number available for emergencies.  The signs and symptoms   of potential delayed complications were discussed with you.  You may return   to normal activities tomorrow.  Written discharge instructions were   provided to you.   You may return to normal activities tomorrow.  None  If you have any questions  or problems, please call your physician.  EMERGENCY PHONE NUMBER: [phone]  LAB RESULTS: Call in two (2) weeks for lab results, [phone]  ___________________________________________  Nurse Signature  ___________________________________________  Patient/Designated Responsible Party Signature  Vernon Houser MD  10/2/2020 12:27:47 PM  This report has been verified and signed electronically.  [employer]

## 2020-10-02 NOTE — BRIEF OP NOTE
Discharge Note  Short Stay      SUMMARY     Admit Date: 10/2/2020    Attending Physician: Vernon Houser Jr., MD     Discharge Physician: Vernon Houser Jr., MD    Discharge Date: 10/2/2020 12:29 PM    Final Diagnosis: Personal history of colonic polyps [Z86.010]  Impression:  - Non-bleeding internal hemorrhoids.                        - Redundant colon.                        - The examination was otherwise normal.                        - The examined portion of the ileum was normal.                        - No specimens collected.   Recommendation:      - Discharge patient to home.                        - High fiber diet.                        - Use fiber, for example Citrucel, Fibercon, Konsyl                        or Metamucil.                        - Repeat colonoscopy in 7 years for surveillance.                        - Continue present medications.                        - Patient has a contact number available for                        emergencies. The signs and symptoms of potential                        delayed complications were discussed with the                        patient. Return to normal activities tomorrow.                        Written discharge instructions were provided to the                        patient.                        - Return to normal activities tomorrow.   Vernon Houser MD   10/2/2020   Disposition: HOME OR SELF CARE    Patient Instructions:   Current Discharge Medication List      CONTINUE these medications which have NOT CHANGED    Details   carvediloL (COREG) 12.5 MG tablet Take 12.5 mg by mouth 2 (two) times daily.       co-enzyme Q-10 30 mg capsule Take 100 mg by mouth once daily. Qunol 100 mg      empagliflozin (JARDIANCE) 10 mg tablet Take 1 tablet (10 mg total) by mouth once daily.  Qty: 90 tablet, Refills: 1      FLAXSEED ORAL Take by mouth. Flaxseed Ground-2 tablespoons daily      hydrochlorothiazide (HYDRODIURIL) 25 MG tablet Take 25 mg by mouth  once daily.  Refills: 1      losartan (COZAAR) 100 MG tablet TAKE 1 TABLET BY MOUTH EVERY DAY  Qty: 90 tablet, Refills: 3      metformin (GLUCOPHAGE) 1000 MG tablet Take 1,000 mg by mouth 2 (two) times daily with meals.   Refills: 1      multivitamin with minerals tablet Take 1 tablet by mouth once daily.      rosuvastatin (CRESTOR) 20 MG tablet Take 1 tablet (20 mg total) by mouth once daily.  Qty: 90 tablet, Refills: 3    Associated Diagnoses: Borderline high cholesterol      blood sugar diagnostic (BLOOD GLUCOSE TEST) Strp by Misc.(Non-Drug; Combo Route) route. One touch Ultra Blue      ONETOUCH DELICA LANCETS 30 gauge Misc       pomegranate xt/pomegranat seed (POMEGRANATE ORAL) Take 8 oz by mouth once daily.             Discharge Procedure Orders (must include Diet, Follow-up, Activity)    Follow Up:  Follow up with PCP as per your routine.  Please follow a high fiber diet.  Activity as tolerated.    No driving day of procedure.

## 2020-10-12 ENCOUNTER — PATIENT OUTREACH (OUTPATIENT)
Dept: ADMINISTRATIVE | Facility: OTHER | Age: 65
End: 2020-10-12

## 2020-10-12 NOTE — PROGRESS NOTES
Health Maintenance Due   Topic Date Due    TETANUS VACCINE  01/13/1973     Updates were requested from care everywhere.  Chart was reviewed for overdue Proactive [facility] Encounters (GERALD) topics (CRS, Breast Cancer Screening, Eye exam)  Health Maintenance has been updated.  LINKS immunization registry triggered.  Immunizations were reconciled.

## 2020-10-13 ENCOUNTER — OFFICE VISIT (OUTPATIENT)
Dept: DERMATOLOGY | Facility: CLINIC | Age: 65
End: 2020-10-13
Payer: MEDICARE

## 2020-10-13 VITALS — RESPIRATION RATE: 18 BRPM | HEIGHT: 70 IN | BODY MASS INDEX: 28.63 KG/M2 | WEIGHT: 200 LBS

## 2020-10-13 DIAGNOSIS — D48.5 NEOPLASM OF UNCERTAIN BEHAVIOR OF SKIN: ICD-10-CM

## 2020-10-13 DIAGNOSIS — L60.3 DYSTROPHIC NAIL: Primary | ICD-10-CM

## 2020-10-13 PROCEDURE — 11102 TANGNTL BX SKIN SINGLE LES: CPT | Mod: S$PBB,,, | Performed by: DERMATOLOGY

## 2020-10-13 PROCEDURE — 11102 PR TANGENTIAL BIOPSY, SKIN, SINGLE LESION: ICD-10-PCS | Mod: S$PBB,,, | Performed by: DERMATOLOGY

## 2020-10-13 PROCEDURE — 88305 TISSUE EXAM BY PATHOLOGIST: CPT | Mod: 26,,, | Performed by: PATHOLOGY

## 2020-10-13 PROCEDURE — 99213 OFFICE O/P EST LOW 20 MIN: CPT | Mod: 25,S$PBB,, | Performed by: DERMATOLOGY

## 2020-10-13 PROCEDURE — 87107 FUNGI IDENTIFICATION MOLD: CPT

## 2020-10-13 PROCEDURE — 99213 PR OFFICE/OUTPT VISIT, EST, LEVL III, 20-29 MIN: ICD-10-PCS | Mod: 25,S$PBB,, | Performed by: DERMATOLOGY

## 2020-10-13 PROCEDURE — 99999 PR PBB SHADOW E&M-EST. PATIENT-LVL III: ICD-10-PCS | Mod: PBBFAC,,, | Performed by: DERMATOLOGY

## 2020-10-13 PROCEDURE — 88305 TISSUE EXAM BY PATHOLOGIST: ICD-10-PCS | Mod: 26,,, | Performed by: PATHOLOGY

## 2020-10-13 PROCEDURE — 87101 SKIN FUNGI CULTURE: CPT

## 2020-10-13 PROCEDURE — 99999 PR PBB SHADOW E&M-EST. PATIENT-LVL III: CPT | Mod: PBBFAC,,, | Performed by: DERMATOLOGY

## 2020-10-13 PROCEDURE — 99213 OFFICE O/P EST LOW 20 MIN: CPT | Mod: PBBFAC,PO | Performed by: DERMATOLOGY

## 2020-10-13 PROCEDURE — 11102 TANGNTL BX SKIN SINGLE LES: CPT | Mod: PBBFAC,PO | Performed by: DERMATOLOGY

## 2020-10-13 PROCEDURE — 88305 TISSUE EXAM BY PATHOLOGIST: CPT | Performed by: PATHOLOGY

## 2020-10-13 NOTE — PROGRESS NOTES
Subjective:       Patient ID:  Merrill Cummings is a 65 y.o. male who presents for   Chief Complaint   Patient presents with    Lesion     back     Nail Problem     purple spot      66 y/o M presents for evaluation of a lesion on his back.   Last OV 8-12-20   The mole on his back has been presents for many many years, seems be be lighter in color but growing in size    He noticed discoloration under R great toenail x approx 1 yr. No other toenails or fingernails involved. He does have muscular dystrophy and walks hard on his toes sometimes (+ trauma).  The toe is not painful       Denies history of NMSC; AKs  Denies family history of melanoma                Past Medical History:   Diagnosis Date    Colon polyp     Diabetes mellitus, type 2     H. pylori infection 02/01/2018    had EGD, undergoing treatment    HEARING LOSS     Hyperlipidemia     Hypertension     Kidney stone     Muscular dystrophy 2012    diagnosed neurology [facility] -wears leg braces    MVP (mitral valve prolapse)     LAQUITA (obstructive sleep apnea)     resolved with weight loss- no longer requires cpap    Tubular adenoma of colon     Urolithiasis        Review of Systems   Constitutional: Negative for fever and chills.   Respiratory: Negative for cough and shortness of breath.    Skin: Positive for rash, dry skin and wears hat. Negative for daily sunscreen use.   Hematologic/Lymphatic: Bruises/bleeds easily (on ASA).        Objective:    Physical Exam   Constitutional: He appears well-developed and well-nourished.   Neurological: He is alert and oriented to person, place, and time.   Psychiatric: He has a normal mood and affect.   Skin:   Areas Examined (abnormalities noted in diagram):   Head / Face Inspection Performed  Back Inspection Performed  Nails and Digits Inspection Performed                      Diagram Legend     Erythematous scaling macule/papule c/w actinic keratosis       Vascular papule c/w angioma      Pigmented verrucoid  papule/plaque c/w seborrheic keratosis      Yellow umbilicated papule c/w sebaceous hyperplasia      Irregularly shaped tan macule c/w lentigo     1-2 mm smooth white papules consistent with Milia      Movable subcutaneous cyst with punctum c/w epidermal inclusion cyst      Subcutaneous movable cyst c/w pilar cyst      Firm pink to brown papule c/w dermatofibroma      Pedunculated fleshy papule(s) c/w skin tag(s)      Evenly pigmented macule c/w junctional nevus     Mildly variegated pigmented, slightly irregular-bordered macule c/w mildly atypical nevus      Flesh colored to evenly pigmented papule c/w intradermal nevus       Pink pearly papule/plaque c/w basal cell carcinoma      Erythematous hyperkeratotic cursted plaque c/w SCC      Surgical scar with no sign of skin cancer recurrence      Open and closed comedones      Inflammatory papules and pustules      Verrucoid papule consistent consistent with wart     Erythematous eczematous patches and plaques     Dystrophic onycholytic nail with subungual debris c/w onychomycosis     Umbilicated papule    Erythematous-base heme-crusted tan verrucoid plaque consistent with inflamed seborrheic keratosis     Erythematous Silvery Scaling Plaque c/w Psoriasis     See annotation      Assessment / Plan:      Pathology Orders:     Normal Orders This Visit    Specimen to Pathology, Dermatology     Comments:    Number of Specimens:->1  ------------------------->-------------------------  Spec 1 Procedure:->Biopsy  Spec 1 Clinical Impression:->SK vs benign nevus vs other  Spec 1 Source:->L ower back    Questions:    Procedure Type: Dermatology and skin neoplasms    Number of Specimens: 1    ------------------------: -------------------------    Spec 1 Procedure: Biopsy    Spec 1 Clinical Impression: SK vs benign nevus vs other    Spec 1 Source: L ower back        Dystrophic nail  -     Fungal culture , skin, hair, or nails  Likely due to trauma but will r/o fungal    Neoplasm of  uncertain behavior of skin  -     Specimen to Pathology, Dermatology    Shave biopsy procedure note:    Shave biopsy performed after verbal consent including risk of infection, scar, recurrence, need for additional treatment of site. Area prepped with alcohol, anesthetized with approximately 1.0cc of 1% lidocaine with epinephrine. Lesional tissue shaved with razor blade. Hemostasis achieved with application of aluminum chloride followed by hyfrecation. No complications. Dressing applied. Wound care explained.             Follow up for pending Pathology.

## 2020-10-15 LAB
FINAL PATHOLOGIC DIAGNOSIS: NORMAL
GROSS: NORMAL
MICROSCOPIC EXAM: NORMAL

## 2020-10-21 ENCOUNTER — TELEPHONE (OUTPATIENT)
Dept: DERMATOLOGY | Facility: CLINIC | Age: 65
End: 2020-10-21

## 2020-10-21 NOTE — TELEPHONE ENCOUNTER
Pt contacted with result.     ----- Message from Adrianna Hernandez MD sent at 10/20/2020 10:14 PM CDT -----  Skin, lower back, shave biopsy:   -MELANOCYTIC NEVUS, INTRADERMAL TYPE  Benign mole  No further action necessary  Thanks  SALEEM

## 2020-11-16 LAB — FUNGUS BLD CULT: NORMAL

## 2020-12-01 DIAGNOSIS — E78.9 BORDERLINE HIGH CHOLESTEROL: ICD-10-CM

## 2020-12-01 RX ORDER — ROSUVASTATIN CALCIUM 20 MG/1
TABLET, COATED ORAL
Qty: 90 TABLET | Refills: 1 | Status: SHIPPED | OUTPATIENT
Start: 2020-12-01 | End: 2021-04-29

## 2021-01-06 ENCOUNTER — LAB VISIT (OUTPATIENT)
Dept: LAB | Facility: HOSPITAL | Age: 66
End: 2021-01-06
Attending: FAMILY MEDICINE
Payer: MEDICARE

## 2021-01-06 DIAGNOSIS — E11.9 TYPE 2 DIABETES MELLITUS WITHOUT COMPLICATION, WITHOUT LONG-TERM CURRENT USE OF INSULIN: ICD-10-CM

## 2021-01-06 LAB
ESTIMATED AVG GLUCOSE: 174 MG/DL (ref 68–131)
HBA1C MFR BLD HPLC: 7.7 % (ref 4–5.6)

## 2021-01-06 PROCEDURE — 83036 HEMOGLOBIN GLYCOSYLATED A1C: CPT

## 2021-01-06 PROCEDURE — 36415 COLL VENOUS BLD VENIPUNCTURE: CPT | Mod: PO

## 2021-01-22 ENCOUNTER — PATIENT MESSAGE (OUTPATIENT)
Dept: ADMINISTRATIVE | Facility: OTHER | Age: 66
End: 2021-01-22

## 2021-04-29 DIAGNOSIS — E78.9 BORDERLINE HIGH CHOLESTEROL: ICD-10-CM

## 2021-04-29 RX ORDER — ROSUVASTATIN CALCIUM 20 MG/1
TABLET, COATED ORAL
Qty: 90 TABLET | Refills: 0 | Status: SHIPPED | OUTPATIENT
Start: 2021-04-29 | End: 2021-08-25

## 2021-05-10 ENCOUNTER — OFFICE VISIT (OUTPATIENT)
Dept: FAMILY MEDICINE | Facility: CLINIC | Age: 66
End: 2021-05-10
Payer: MEDICARE

## 2021-05-10 VITALS
WEIGHT: 216.19 LBS | TEMPERATURE: 97 F | OXYGEN SATURATION: 97 % | SYSTOLIC BLOOD PRESSURE: 138 MMHG | DIASTOLIC BLOOD PRESSURE: 96 MMHG | BODY MASS INDEX: 30.95 KG/M2 | HEIGHT: 70 IN | HEART RATE: 73 BPM

## 2021-05-10 DIAGNOSIS — H01.00B BLEPHARITIS OF BOTH UPPER AND LOWER EYELID OF LEFT EYE, UNSPECIFIED TYPE: Primary | ICD-10-CM

## 2021-05-10 PROCEDURE — 99213 OFFICE O/P EST LOW 20 MIN: CPT | Mod: PBBFAC,PO | Performed by: NURSE PRACTITIONER

## 2021-05-10 PROCEDURE — 99999 PR PBB SHADOW E&M-EST. PATIENT-LVL III: ICD-10-PCS | Mod: PBBFAC,,, | Performed by: NURSE PRACTITIONER

## 2021-05-10 PROCEDURE — 99999 PR PBB SHADOW E&M-EST. PATIENT-LVL III: CPT | Mod: PBBFAC,,, | Performed by: NURSE PRACTITIONER

## 2021-05-10 PROCEDURE — 99213 OFFICE O/P EST LOW 20 MIN: CPT | Mod: S$PBB,,, | Performed by: NURSE PRACTITIONER

## 2021-05-10 PROCEDURE — 99213 PR OFFICE/OUTPT VISIT, EST, LEVL III, 20-29 MIN: ICD-10-PCS | Mod: S$PBB,,, | Performed by: NURSE PRACTITIONER

## 2021-05-10 RX ORDER — ERYTHROMYCIN 5 MG/G
OINTMENT OPHTHALMIC 2 TIMES DAILY
Qty: 1 G | Refills: 0 | Status: SHIPPED | OUTPATIENT
Start: 2021-05-10 | End: 2021-12-17 | Stop reason: CLARIF

## 2021-08-25 DIAGNOSIS — E78.9 BORDERLINE HIGH CHOLESTEROL: ICD-10-CM

## 2021-08-25 RX ORDER — ROSUVASTATIN CALCIUM 20 MG/1
TABLET, COATED ORAL
Qty: 30 TABLET | Refills: 0 | Status: SHIPPED | OUTPATIENT
Start: 2021-08-25 | End: 2021-09-17

## 2021-10-14 LAB
CHOLEST SERPL-MSCNC: 113 MG/DL (ref 0–200)
HBA1C MFR BLD: 6.9 % (ref ?–5.7)
HDLC SERPL-MCNC: 41 MG/DL (ref 35–70)
LDL/HDL RATIO: 1.3 (ref 0–3.6)
LDLC SERPL CALC-MCNC: 55 MG/DL (ref 0–99)
TRIGL SERPL-MCNC: 86 MG/DL (ref 0–150)
VLDL CHOLESTEROL: 17 MG/DL (ref 5–40)

## 2021-10-18 DIAGNOSIS — E78.9 BORDERLINE HIGH CHOLESTEROL: ICD-10-CM

## 2021-10-19 ENCOUNTER — PATIENT MESSAGE (OUTPATIENT)
Dept: FAMILY MEDICINE | Facility: CLINIC | Age: 66
End: 2021-10-19
Payer: MEDICARE

## 2021-10-19 ENCOUNTER — TELEPHONE (OUTPATIENT)
Dept: FAMILY MEDICINE | Facility: CLINIC | Age: 66
End: 2021-10-19

## 2021-10-19 RX ORDER — ROSUVASTATIN CALCIUM 20 MG/1
TABLET, COATED ORAL
Qty: 30 TABLET | Refills: 0 | Status: SHIPPED | OUTPATIENT
Start: 2021-10-19 | End: 2021-11-11

## 2021-11-08 ENCOUNTER — OFFICE VISIT (OUTPATIENT)
Dept: FAMILY MEDICINE | Facility: CLINIC | Age: 66
End: 2021-11-08
Payer: MEDICARE

## 2021-11-08 ENCOUNTER — LAB VISIT (OUTPATIENT)
Dept: LAB | Facility: HOSPITAL | Age: 66
End: 2021-11-08
Attending: FAMILY MEDICINE
Payer: MEDICARE

## 2021-11-08 VITALS
HEART RATE: 86 BPM | SYSTOLIC BLOOD PRESSURE: 123 MMHG | BODY MASS INDEX: 30.64 KG/M2 | WEIGHT: 214 LBS | TEMPERATURE: 98 F | HEIGHT: 70 IN | DIASTOLIC BLOOD PRESSURE: 75 MMHG

## 2021-11-08 DIAGNOSIS — M21.371 BILATERAL FOOT-DROP: ICD-10-CM

## 2021-11-08 DIAGNOSIS — G60.0 CHARCOT MARIE TOOTH MUSCULAR ATROPHY: ICD-10-CM

## 2021-11-08 DIAGNOSIS — R97.20 ELEVATED PSA: ICD-10-CM

## 2021-11-08 DIAGNOSIS — M75.102 ROTATOR CUFF SYNDROME OF LEFT SHOULDER: ICD-10-CM

## 2021-11-08 DIAGNOSIS — E11.59 HYPERTENSION ASSOCIATED WITH DIABETES: ICD-10-CM

## 2021-11-08 DIAGNOSIS — Z79.899 ENCOUNTER FOR LONG-TERM (CURRENT) USE OF MEDICATIONS: ICD-10-CM

## 2021-11-08 DIAGNOSIS — E78.5 HYPERLIPIDEMIA ASSOCIATED WITH TYPE 2 DIABETES MELLITUS: ICD-10-CM

## 2021-11-08 DIAGNOSIS — I15.2 HYPERTENSION ASSOCIATED WITH DIABETES: ICD-10-CM

## 2021-11-08 DIAGNOSIS — M21.372 BILATERAL FOOT-DROP: ICD-10-CM

## 2021-11-08 DIAGNOSIS — E11.9 TYPE 2 DIABETES MELLITUS WITHOUT COMPLICATION, WITHOUT LONG-TERM CURRENT USE OF INSULIN: ICD-10-CM

## 2021-11-08 DIAGNOSIS — E11.9 TYPE 2 DIABETES MELLITUS WITHOUT COMPLICATION, WITHOUT LONG-TERM CURRENT USE OF INSULIN: Primary | ICD-10-CM

## 2021-11-08 DIAGNOSIS — E11.69 HYPERLIPIDEMIA ASSOCIATED WITH TYPE 2 DIABETES MELLITUS: ICD-10-CM

## 2021-11-08 LAB
COMPLEXED PSA SERPL-MCNC: 21 NG/ML (ref 0–4)
VIT B12 SERPL-MCNC: 834 PG/ML (ref 210–950)

## 2021-11-08 PROCEDURE — 99999 PR PBB SHADOW E&M-EST. PATIENT-LVL IV: CPT | Mod: PBBFAC,,, | Performed by: FAMILY MEDICINE

## 2021-11-08 PROCEDURE — 99999 PR PBB SHADOW E&M-EST. PATIENT-LVL IV: ICD-10-PCS | Mod: PBBFAC,,, | Performed by: FAMILY MEDICINE

## 2021-11-08 PROCEDURE — 36415 COLL VENOUS BLD VENIPUNCTURE: CPT | Mod: PO | Performed by: FAMILY MEDICINE

## 2021-11-08 PROCEDURE — 99214 OFFICE O/P EST MOD 30 MIN: CPT | Mod: S$PBB,,, | Performed by: FAMILY MEDICINE

## 2021-11-08 PROCEDURE — 82607 VITAMIN B-12: CPT | Performed by: FAMILY MEDICINE

## 2021-11-08 PROCEDURE — 99214 PR OFFICE/OUTPT VISIT, EST, LEVL IV, 30-39 MIN: ICD-10-PCS | Mod: S$PBB,,, | Performed by: FAMILY MEDICINE

## 2021-11-08 PROCEDURE — 84153 ASSAY OF PSA TOTAL: CPT | Performed by: FAMILY MEDICINE

## 2021-11-08 PROCEDURE — 99214 OFFICE O/P EST MOD 30 MIN: CPT | Mod: PBBFAC,PO | Performed by: FAMILY MEDICINE

## 2021-11-08 RX ORDER — GLIPIZIDE 2.5 MG/1
2.5 TABLET, EXTENDED RELEASE ORAL 2 TIMES DAILY
COMMUNITY
Start: 2020-11-02 | End: 2023-06-05

## 2021-11-09 ENCOUNTER — TELEPHONE (OUTPATIENT)
Dept: UROLOGY | Facility: CLINIC | Age: 66
End: 2021-11-09
Payer: MEDICARE

## 2021-11-09 ENCOUNTER — PATIENT OUTREACH (OUTPATIENT)
Dept: ADMINISTRATIVE | Facility: OTHER | Age: 66
End: 2021-11-09
Payer: MEDICARE

## 2021-11-09 ENCOUNTER — PATIENT MESSAGE (OUTPATIENT)
Dept: FAMILY MEDICINE | Facility: CLINIC | Age: 66
End: 2021-11-09
Payer: MEDICARE

## 2021-11-11 ENCOUNTER — OFFICE VISIT (OUTPATIENT)
Dept: UROLOGY | Facility: CLINIC | Age: 66
End: 2021-11-11
Payer: MEDICARE

## 2021-11-11 VITALS — WEIGHT: 214.06 LBS | HEIGHT: 70 IN | BODY MASS INDEX: 30.65 KG/M2

## 2021-11-11 DIAGNOSIS — Z80.42 FAMILY HISTORY OF PROSTATE CANCER: ICD-10-CM

## 2021-11-11 DIAGNOSIS — E78.9 BORDERLINE HIGH CHOLESTEROL: ICD-10-CM

## 2021-11-11 DIAGNOSIS — N40.1 ENLARGED PROSTATE WITH URINARY OBSTRUCTION: ICD-10-CM

## 2021-11-11 DIAGNOSIS — N13.8 ENLARGED PROSTATE WITH URINARY OBSTRUCTION: ICD-10-CM

## 2021-11-11 DIAGNOSIS — R97.20 ELEVATED PSA: Primary | ICD-10-CM

## 2021-11-11 PROCEDURE — 99214 OFFICE O/P EST MOD 30 MIN: CPT | Mod: S$PBB,,, | Performed by: UROLOGY

## 2021-11-11 PROCEDURE — 99214 PR OFFICE/OUTPT VISIT, EST, LEVL IV, 30-39 MIN: ICD-10-PCS | Mod: S$PBB,,, | Performed by: UROLOGY

## 2021-11-11 PROCEDURE — 99999 PR PBB SHADOW E&M-EST. PATIENT-LVL III: ICD-10-PCS | Mod: PBBFAC,,, | Performed by: UROLOGY

## 2021-11-11 PROCEDURE — 99213 OFFICE O/P EST LOW 20 MIN: CPT | Mod: PBBFAC,PO | Performed by: UROLOGY

## 2021-11-11 PROCEDURE — 99999 PR PBB SHADOW E&M-EST. PATIENT-LVL III: CPT | Mod: PBBFAC,,, | Performed by: UROLOGY

## 2021-11-11 RX ORDER — ROSUVASTATIN CALCIUM 20 MG/1
TABLET, COATED ORAL
Qty: 90 TABLET | Refills: 3 | Status: SHIPPED | OUTPATIENT
Start: 2021-11-11 | End: 2022-11-17

## 2021-11-16 DIAGNOSIS — R97.20 ELEVATED PSA: Primary | ICD-10-CM

## 2021-11-16 DIAGNOSIS — F41.9 ANXIETY: ICD-10-CM

## 2021-11-16 RX ORDER — DIAZEPAM 10 MG/1
10 TABLET ORAL ONCE AS NEEDED
Qty: 1 TABLET | Refills: 0 | Status: SHIPPED | OUTPATIENT
Start: 2021-11-16 | End: 2021-12-17 | Stop reason: CLARIF

## 2021-11-17 ENCOUNTER — PATIENT OUTREACH (OUTPATIENT)
Dept: ADMINISTRATIVE | Facility: HOSPITAL | Age: 66
End: 2021-11-17
Payer: MEDICARE

## 2021-11-18 LAB
LEFT EYE DM RETINOPATHY: NEGATIVE
RIGHT EYE DM RETINOPATHY: NEGATIVE

## 2021-11-23 ENCOUNTER — TELEPHONE (OUTPATIENT)
Dept: UROLOGY | Facility: CLINIC | Age: 66
End: 2021-11-23
Payer: MEDICARE

## 2021-11-23 ENCOUNTER — PATIENT MESSAGE (OUTPATIENT)
Dept: UROLOGY | Facility: CLINIC | Age: 66
End: 2021-11-23
Payer: MEDICARE

## 2021-11-24 ENCOUNTER — TELEPHONE (OUTPATIENT)
Dept: UROLOGY | Facility: CLINIC | Age: 66
End: 2021-11-24
Payer: MEDICARE

## 2021-11-29 ENCOUNTER — PATIENT MESSAGE (OUTPATIENT)
Dept: UROLOGY | Facility: CLINIC | Age: 66
End: 2021-11-29
Payer: MEDICARE

## 2021-11-29 ENCOUNTER — TELEPHONE (OUTPATIENT)
Dept: UROLOGY | Facility: CLINIC | Age: 66
End: 2021-11-29
Payer: MEDICARE

## 2021-11-30 ENCOUNTER — PATIENT MESSAGE (OUTPATIENT)
Dept: UROLOGY | Facility: CLINIC | Age: 66
End: 2021-11-30
Payer: MEDICARE

## 2021-11-30 DIAGNOSIS — R97.20 ELEVATED PSA: Primary | ICD-10-CM

## 2021-12-01 RX ORDER — CIPROFLOXACIN 500 MG/1
500 TABLET ORAL EVERY 12 HOURS
Qty: 6 TABLET | Refills: 0 | Status: SHIPPED | OUTPATIENT
Start: 2021-12-19 | End: 2021-12-22

## 2021-12-23 ENCOUNTER — PATIENT MESSAGE (OUTPATIENT)
Dept: UROLOGY | Facility: CLINIC | Age: 66
End: 2021-12-23
Payer: MEDICARE

## 2021-12-27 ENCOUNTER — TELEPHONE (OUTPATIENT)
Dept: UROLOGY | Facility: CLINIC | Age: 66
End: 2021-12-27
Payer: MEDICARE

## 2021-12-29 ENCOUNTER — PATIENT MESSAGE (OUTPATIENT)
Dept: UROLOGY | Facility: CLINIC | Age: 66
End: 2021-12-29
Payer: MEDICARE

## 2021-12-29 ENCOUNTER — TELEPHONE (OUTPATIENT)
Dept: UROLOGY | Facility: CLINIC | Age: 66
End: 2021-12-29
Payer: MEDICARE

## 2022-01-03 ENCOUNTER — PATIENT MESSAGE (OUTPATIENT)
Dept: UROLOGY | Facility: CLINIC | Age: 67
End: 2022-01-03
Payer: MEDICARE

## 2022-01-03 ENCOUNTER — TELEPHONE (OUTPATIENT)
Dept: UROLOGY | Facility: CLINIC | Age: 67
End: 2022-01-03
Payer: MEDICARE

## 2022-01-03 NOTE — TELEPHONE ENCOUNTER
After several consultations, I talked to Dr Genaro Collins, who turned out to be very familiar with the test pt inquired about (PSMA pet scan). Dr Collins will call pt and make arrangements to have the test.

## 2022-01-03 NOTE — TELEPHONE ENCOUNTER
----- Message from Brittny Rodriguez sent at 1/3/2022  9:00 AM CST -----  Contact: Wife  Type:  Needs Medical Advice    Who Called:  Wife    Would the patient rather a call back or a response via [facility]?  Call    Best Call Back Number:  [phone]    Additional Information:  Wife needs to speak to then nurse about scheduling scans for patient     Please call to schedule

## 2022-01-05 ENCOUNTER — DOCUMENTATION ONLY (OUTPATIENT)
Dept: ADMINISTRATIVE | Facility: OTHER | Age: 67
End: 2022-01-05
Payer: MEDICARE

## 2022-01-06 ENCOUNTER — TELEPHONE (OUTPATIENT)
Dept: UROLOGY | Facility: CLINIC | Age: 67
End: 2022-01-06
Payer: MEDICARE

## 2022-01-06 DIAGNOSIS — C61 PROSTATE CANCER: Primary | ICD-10-CM

## 2022-01-09 ENCOUNTER — TELEPHONE (OUTPATIENT)
Dept: UROLOGY | Facility: CLINIC | Age: 67
End: 2022-01-09
Payer: MEDICARE

## 2022-01-09 NOTE — TELEPHONE ENCOUNTER
Florencia, this pt needs to start androgen ablation therapy as soon as we can. Can you book him for a Lupron injection?

## 2022-01-14 ENCOUNTER — PATIENT MESSAGE (OUTPATIENT)
Dept: UROLOGY | Facility: CLINIC | Age: 67
End: 2022-01-14
Payer: MEDICARE

## 2022-01-17 ENCOUNTER — PATIENT MESSAGE (OUTPATIENT)
Dept: UROLOGY | Facility: CLINIC | Age: 67
End: 2022-01-17
Payer: MEDICARE

## 2022-01-17 NOTE — TELEPHONE ENCOUNTER
I dont see an appointment for this pt to get his first Lupron injection. Is this being planned?  Thank you   Dr matthews

## 2022-02-09 ENCOUNTER — OFFICE VISIT (OUTPATIENT)
Dept: UROLOGY | Facility: CLINIC | Age: 67
End: 2022-02-09
Payer: MEDICARE

## 2022-02-09 VITALS — HEIGHT: 68 IN | BODY MASS INDEX: 32.31 KG/M2 | WEIGHT: 213.19 LBS

## 2022-02-09 DIAGNOSIS — Z82.62 FAMILY HISTORY OF OSTEOPOROSIS: Primary | ICD-10-CM

## 2022-02-09 DIAGNOSIS — Z79.818 LONG TERM (CURRENT) USE OF OTHER AGENTS AFFECTING ESTROGEN RECEPTORS AND ESTROGEN LEVELS: ICD-10-CM

## 2022-02-09 DIAGNOSIS — Z80.42 FAMILY HX OF PROSTATE CANCER: ICD-10-CM

## 2022-02-09 DIAGNOSIS — C61 PROSTATE CANCER: ICD-10-CM

## 2022-02-09 PROCEDURE — 99999 PR PBB SHADOW E&M-EST. PATIENT-LVL III: CPT | Mod: PBBFAC,,, | Performed by: UROLOGY

## 2022-02-09 PROCEDURE — 99999 PR PBB SHADOW E&M-EST. PATIENT-LVL III: ICD-10-PCS | Mod: PBBFAC,,, | Performed by: UROLOGY

## 2022-02-09 PROCEDURE — 99499 NO LOS: ICD-10-PCS | Mod: S$PBB,,, | Performed by: UROLOGY

## 2022-02-09 PROCEDURE — 99499 UNLISTED E&M SERVICE: CPT | Mod: S$PBB,,, | Performed by: UROLOGY

## 2022-02-09 PROCEDURE — 96402 CHEMO HORMON ANTINEOPL SQ/IM: CPT | Mod: PBBFAC | Performed by: UROLOGY

## 2022-02-09 PROCEDURE — 99213 OFFICE O/P EST LOW 20 MIN: CPT | Mod: PBBFAC,25,PO | Performed by: UROLOGY

## 2022-02-09 RX ORDER — IBUPROFEN 200 MG
200 TABLET ORAL EVERY 6 HOURS PRN
COMMUNITY
End: 2022-08-22

## 2022-02-09 RX ORDER — EPINEPHRINE 0.22MG
100 AEROSOL WITH ADAPTER (ML) INHALATION DAILY
COMMUNITY

## 2022-02-09 RX ORDER — MULTIVITAMIN
TABLET ORAL
COMMUNITY
Start: 2021-11-18 | End: 2022-02-09

## 2022-02-09 RX ORDER — ASCORBIC ACID 1000 MG
TABLET ORAL
COMMUNITY
Start: 2021-11-18 | End: 2022-02-09 | Stop reason: SDUPTHER

## 2022-02-09 RX ADMIN — LEUPROLIDE ACETATE 45 MG: KIT at 11:02

## 2022-02-11 ENCOUNTER — HOSPITAL ENCOUNTER (OUTPATIENT)
Dept: RADIOLOGY | Facility: HOSPITAL | Age: 67
Discharge: HOME | End: 2022-02-11
Attending: UROLOGY
Payer: MEDICARE

## 2022-02-11 DIAGNOSIS — C61 PROSTATE CANCER: ICD-10-CM

## 2022-02-11 DIAGNOSIS — Z79.818 LONG TERM (CURRENT) USE OF OTHER AGENTS AFFECTING ESTROGEN RECEPTORS AND ESTROGEN LEVELS: ICD-10-CM

## 2022-02-11 DIAGNOSIS — Z82.62 FAMILY HISTORY OF OSTEOPOROSIS: ICD-10-CM

## 2022-02-11 PROCEDURE — 77080 DEXA BONE DENSITY SPINE HIP: ICD-10-PCS | Mod: 26,,, | Performed by: RADIOLOGY

## 2022-02-11 PROCEDURE — 77080 DXA BONE DENSITY AXIAL: CPT | Mod: TC,PO

## 2022-02-11 PROCEDURE — 77080 DXA BONE DENSITY AXIAL: CPT | Mod: 26,,, | Performed by: RADIOLOGY

## 2022-02-14 ENCOUNTER — TELEPHONE (OUTPATIENT)
Dept: SURGERY | Facility: HOSPITAL | Age: 67
End: 2022-02-14
Payer: MEDICARE

## 2022-02-14 DIAGNOSIS — R97.20 ELEVATED PSA: Primary | ICD-10-CM

## 2022-02-14 NOTE — TELEPHONE ENCOUNTER
l called pt and let him know his dxa bone scan showed mild osteopenia. In view of our plans to do 1.5 years of lupron, it would be good to give him some calcium supplementation, vit D, etc. Dr edgardo argueta suggested I send to him, since he does a lot of the treatments to prevent bone fractures in our lupron patients.     PLEASE CALL DR ARGUETA'S NURSE AND ARRANGE AN APPOINTMENT WITH DR ARGUETA. Edy already accepted.

## 2022-02-24 ENCOUNTER — TELEPHONE (OUTPATIENT)
Dept: HEMATOLOGY/ONCOLOGY | Facility: CLINIC | Age: 67
End: 2022-02-24
Payer: MEDICARE

## 2022-02-24 NOTE — TELEPHONE ENCOUNTER
Spoke with patient regarding referral from Dr. Edmonds.  Patient stated that his family has a history of osteoporosis, therefore he has requested a Bone Scan to determine his status.  Patient had positive prostate bx in 12/2021 with elevated PSA.  Patient stated that he will start androgen therapy then start XRT @ [facility] under the direction of Dr. Collins, unsure of exact date. Patient inquired what options are available for his condition. Explained to patient that provider will review his entire history, labs, bone scan, etc to recommend the treatment options.  Explained to patient different options, oral/IM/IV.  Patient verbalized understanding.  Patient was offered 2/28 @ 9 am, he accepted and confirmed understanding of date, time and location.

## 2022-02-28 ENCOUNTER — OFFICE VISIT (OUTPATIENT)
Dept: HEMATOLOGY/ONCOLOGY | Facility: CLINIC | Age: 67
End: 2022-02-28
Payer: MEDICARE

## 2022-02-28 VITALS
SYSTOLIC BLOOD PRESSURE: 158 MMHG | BODY MASS INDEX: 31.28 KG/M2 | OXYGEN SATURATION: 95 % | HEART RATE: 78 BPM | DIASTOLIC BLOOD PRESSURE: 102 MMHG | WEIGHT: 211.19 LBS | HEIGHT: 69 IN | TEMPERATURE: 98 F | RESPIRATION RATE: 18 BRPM

## 2022-02-28 DIAGNOSIS — Z79.818 ANDROGEN DEPRIVATION THERAPY: ICD-10-CM

## 2022-02-28 DIAGNOSIS — C61 PROSTATE CANCER: Primary | ICD-10-CM

## 2022-02-28 DIAGNOSIS — R97.20 ELEVATED PSA: ICD-10-CM

## 2022-02-28 PROCEDURE — 99204 PR OFFICE/OUTPT VISIT, NEW, LEVL IV, 45-59 MIN: ICD-10-PCS | Mod: S$PBB,,, | Performed by: INTERNAL MEDICINE

## 2022-02-28 PROCEDURE — 99999 PR PBB SHADOW E&M-EST. PATIENT-LVL IV: CPT | Mod: PBBFAC,,, | Performed by: INTERNAL MEDICINE

## 2022-02-28 PROCEDURE — 99999 PR PBB SHADOW E&M-EST. PATIENT-LVL IV: ICD-10-PCS | Mod: PBBFAC,,, | Performed by: INTERNAL MEDICINE

## 2022-02-28 PROCEDURE — 99214 OFFICE O/P EST MOD 30 MIN: CPT | Mod: PBBFAC,PN | Performed by: INTERNAL MEDICINE

## 2022-02-28 PROCEDURE — 99204 OFFICE O/P NEW MOD 45 MIN: CPT | Mod: S$PBB,,, | Performed by: INTERNAL MEDICINE

## 2022-02-28 RX ORDER — FERROUS SULFATE, DRIED 160(50) MG
1 TABLET, EXTENDED RELEASE ORAL 2 TIMES DAILY
Qty: 180 TABLET | Refills: 3 | Status: SHIPPED | OUTPATIENT
Start: 2022-02-28 | End: 2023-03-10 | Stop reason: SDUPTHER

## 2022-02-28 NOTE — LETTER
February 28, 2022        Dilan Edmonds MD  [address] [facility] [address] [place] [address]             [facility] - Hematology Oncology  [address] [facility] [address]  Phone: [phone]  Fax: [phone]   Patient: Merrill Cummings   MR Number: [id number]   YOB: 1955   Date of Visit: 2/28/2022       Dear Dr. Edmonds:    Thank you for referring Merrill Cummings to me for evaluation of prostate cancer and prevention of androgen deprivation induced bone loss. Below are the relevant portions of my assessment and plan of care.  If you have questions, please do not hesitate to call me. I look forward to following Merrill along with you.    Sincerely,      MD SALLY Stevens MD Kenneth A. Gaddis, MD Marc Bernstein, MD Daniel G. Larriviere, MD

## 2022-02-28 NOTE — LETTER
February 28, 2022        Dilan Edmonds MD  [address] [facility] [address] [place] [address]             [facility] - Hematology Oncology  [address] [facility] [address]  Phone: [phone]  Fax: [phone]   Patient: Merrill Cummings   MR Number: [id number]   YOB: 1955   Date of Visit: 2/28/2022       Dear Dr. Edmonds:    Thank you for referring Merrill Cummings to me for evaluation of prostate cancer and prevention of androgen deprivation induced bone loss. Below are the relevant portions of my assessment and plan of care.  If you have questions, please do not hesitate to call me. I look forward to following Merrill along with you.    Sincerely,      MD SALLY Stevens MD Kenneth A. Gaddis, MD Marc Bernstein, MD Daniel G. Larriviere, MD Gregory C. Henkelmann, MD

## 2022-02-28 NOTE — Clinical Note
Return to clinic at earliest convenience for 1st dose of Prolia. See me 6 months later with interval CBC, CMP, LDH, and PSA prior to appointment.

## 2022-02-28 NOTE — PROGRESS NOTES
Chief complaint:  Adenocarcinoma the prostate    History of present illness:  The patient is a 67-year-old white gentleman who presents in consultation from Dr. Edmonds regarding recently diagnosed adenocarcinoma of the prostate.  Patient had a prior history of elevated PSA and several negative prostate biopsies before his most recent 1 returned positive.  Patient has been started on definitive external beam radiation therapy as there was no evidence of metastatic disease.  Patient is also receiving an 18 month course of androgen deprivation with Dr. Edmonds.  Consultation is requested regarding maintenance of bone health during androgen deprivation.  Recent bone density revealed osteopenia.    Past medical history:  1. Sensorineural hearing loss  2. Rotator cuff syndrome of the left shoulder  3. Gait disturbance/footdrop/Charcot-Mague-Tooth   4. Gallbladder polyps  5. Gout involving the right ankle  6. Gastroesophageal reflux disease   7. Essential tremor  8. Type 2 diabetes mellitus    9. Colon polyps  10. Hypertension  11. History of ankle fracture    Allergies:  1. Oxybutynin  2. Nexium    Medications:  1.  Coreg 12.5 mg p.o. b.i.d.  2. Coenzyme Q10 100 mg p.o. daily  3. Flaxseed oil daily  4. Glucotrol extended release 2.5 mg p.o. b.i.d.  5. Hydrochlorothiazide 25 mg p.o. daily  6. Ibuprofen 200 mg every 6 hours as needed for pain  7. Cozaar 100 mg p.o. daily  8. Metformin 1000 mg p.o. b.i.d. with meals  9. Crestor 20 mg p.o. daily    Family/social history:  Patient is a never smoker.  Patient does not consume alcohol.  Followed  due to lung cancer/also suffered from prostate carcinoma.    Physical examination:  Well-developed, well-nourished, white gentleman, no acute distress, who has a weight of 211 lb  VITAL SIGNS: Documented  and reviewed this visit.  HEENT: Normocephalic, atraumatic. Oral mucosa pink and moist. Lips without lesions. Tongue midline. Oropharynx clear. Nonicteric sclerae.   NECK:  Supple, no adenopathy. No carotid bruits, thyromegaly or thyroid nodule.   HEART: Regular rate and rhythm without murmur, gallop or rub.   LUNGS: Clear to auscultation bilaterally. Normal respiratory effort.   ABDOMEN: Soft, nontender, nondistended with positive normoactive bowel sounds, no hepatosplenomegaly.   EXTREMITIES: No cyanosis, clubbing or edema. Distal pulses are intact.   AXILLAE AND GROIN: No palpable pathologic lymphadenopathy is appreciated.   SKIN: Intact/turgor normal   NEUROLOGIC: Cranial nerves II-XII grossly intact. Motor: Good muscle bulk and tone. Strength/sensory 5/5 throughout. Gait disturbed/unstable.     Laboratory:  PSA obtained on 11/18/2020 was elevated 21.    Bone mineral density:  Osteopenia with an 8.5% risk of major osteoporotic fracture/1.1% risk of hip fracture over the next 10 years.    Impression:  1. Adenocarcinoma the prostate  2. Osteopenia  3. Chronic/ongoing androgen deprivation.    Plan:  1. Start calcium with vitamin-D 2 tabs daily.  2. Prolia 60 mg subQ every 6 months x3 doses during patient's treatment for prostate carcinoma.  3. Return to clinic in earliest convenience for 1st dose and seems 6 months later with interval CBC, CMP, LDH, and PSA prior to appointment.    This note was created using voice recognition software and may contain grammatical errors.

## 2022-03-02 ENCOUNTER — INFUSION (OUTPATIENT)
Dept: INFUSION THERAPY | Facility: HOSPITAL | Age: 67
End: 2022-03-02
Attending: INTERNAL MEDICINE
Payer: MEDICARE

## 2022-03-02 VITALS
SYSTOLIC BLOOD PRESSURE: 156 MMHG | HEART RATE: 85 BPM | TEMPERATURE: 99 F | RESPIRATION RATE: 18 BRPM | DIASTOLIC BLOOD PRESSURE: 96 MMHG

## 2022-03-02 DIAGNOSIS — Z79.818 ANDROGEN DEPRIVATION THERAPY: Primary | ICD-10-CM

## 2022-03-02 PROCEDURE — 96372 THER/PROPH/DIAG INJ SC/IM: CPT | Mod: PN

## 2022-03-02 PROCEDURE — 63600175 PHARM REV CODE 636 W HCPCS: Mod: JG,PN | Performed by: INTERNAL MEDICINE

## 2022-03-02 RX ADMIN — DENOSUMAB 60 MG: 60 INJECTION SUBCUTANEOUS at 09:03

## 2022-03-02 NOTE — PLAN OF CARE
Problem: Adult Inpatient Plan of Care  Goal: Plan of Care Review  Outcome: Ongoing, Progressing  Flowsheets (Taken 3/2/2022 0921)  Plan of Care Reviewed With: patient  Goal: Patient-Specific Goal (Individualized)  Outcome: Ongoing, Progressing     Problem: Fatigue  Goal: Improved Activity Tolerance  Outcome: Ongoing, Progressing     Pt tolerated prolia injection well.   No adverse reaction noted.  Pt has elevated BP, states Cardiologist is well aware and monitoring BP.   Pt left clinic in no acute distress.

## 2022-03-14 ENCOUNTER — PATIENT MESSAGE (OUTPATIENT)
Dept: INFUSION THERAPY | Facility: HOSPITAL | Age: 67
End: 2022-03-14
Payer: MEDICARE

## 2022-03-28 ENCOUNTER — DOCUMENTATION ONLY (OUTPATIENT)
Dept: ADMINISTRATIVE | Facility: OTHER | Age: 67
End: 2022-03-28
Payer: MEDICARE

## 2022-03-28 RX ORDER — TAMSULOSIN HYDROCHLORIDE 0.4 MG/1
0.4 CAPSULE ORAL DAILY
Qty: 30 CAPSULE | Refills: 11 | Status: SHIPPED | OUTPATIENT
Start: 2022-03-28 | End: 2023-03-23

## 2022-03-30 ENCOUNTER — TELEPHONE (OUTPATIENT)
Dept: UROLOGY | Facility: CLINIC | Age: 67
End: 2022-03-30
Payer: MEDICARE

## 2022-03-30 ENCOUNTER — PATIENT MESSAGE (OUTPATIENT)
Dept: UROLOGY | Facility: CLINIC | Age: 67
End: 2022-03-30
Payer: MEDICARE

## 2022-03-30 NOTE — TELEPHONE ENCOUNTER
----- Message from MARLEY Barr MD sent at 3/28/2022  6:12 PM CDT -----  Regarding: FW: Bladder outlet obstruction SX  I will send Flomax to his pharmacy    ----- Message -----  From: Lv Collins MD  Sent: 3/28/2022   3:03 PM CDT  To: Dilan Edmonds MD, MARLEY Barr MD  Subject: Bladder outlet obstruction SX                    Garrett / Herber,    I saw your patient today.  He has an IPSS of 17 and I suspect that he would have improvement with a Flomax type med.  The patient will start RT in about 2 weeks and improving his bladder outlet obstruction SX would be helpful.     I told the patient that you would be the best to make the decision about RX Flomax type med.    Thanks.    Genaro

## 2022-03-31 NOTE — PROGRESS NOTES
2022      Dilan Edmonds MD  [address] [facility]  [place] [address]    RE: Merrill Cummings    MR#:  [id number]    :  1955       DX:      1. High risk adenocarcinoma of the prostate diagnosed in 2021.  Group clinical stage IIIA. cT1c cN0 cM0  Hope score 4+3=7.  PSA = 21.   Patient's father and grandfather  had prostate cancer.    2. History of Charcot Mague Tooth muscular dystrophy.       Dear Too:     I am seeing your patient today, and this note will supplement information since I saw the patient in consultation on .  On , PSMA PET-CT scan performed for both diagnostic as well as radiation therapy treatment planning purposes.  The scan  was negative for any areas of metastasis.     On  of this year, the patient started androgen-deprivation therapy and received a six-month androgen deprivation shot.  As previously mentioned, normally we would consider 1.5 years of androgen deprivation in this situation of high-risk adenocarcinoma. However, because of a number of factors that were discussed in the  consultation, in this case it may be appropriate to stop at one year and this is mostly because of the patient's Charcot-Mague-Tooth disease.  Patient has tolerated the androgen-deprivation therapy fairly well, although he does have some hot flashes.  It should be noted that the patient has not been started on Flomax, although his International Prostate Symptom Score that I obtained on  showed moderate obstructive symptoms with an IPSS of 17.     On , the patient saw Dr. Lv Snow.  Dr. Snow started the patient on calcium and vitamin D, and also gave subcu Prolia for bone health.     On , my partner, Dr. Fuentes, inserted gold  fiducial markers into the prostate that will be used on a daily basis for precise image guidance.  At the same time, a SpaceOAR gel was inserted to decrease the dose to the anterior rectum.  Patient tolerated this procedure well.    ASSESSMENT AND PLAN:  Later today, treatment planning CT scan will be performed, and I am anticipating that definitive irradiation will probably start on April 11.  I have communicated with you and your partner, Dr. Barr, that I think that the patient may benefit by starting Flomax-type medicine and it probably is better to get his bladder outlet obstruction symptoms improved before starting irradiation.  I am considering either 70 Gy at 2.5 Gy per fraction versus 78 Gy at 2 Gy per fraction, and this decision will be made after the patient has started Flomax under your direction.  If the patient still has considerable bladder outlet obstruction symptoms, I am more likely to recommend 78 Gy at 2 Gy per fraction, since this would have a lower risk for increasing bladder outlet obstruction symptoms.  My preferred fractionation, however, is the prostate receiving 70 Gy in six weeks at 2.5 Gy per fraction if his urinary symptoms can be improved with Flomax.  I once again went over the acute and late effects of irradiation. Multiple questions were answered. He has good understanding and is in complete agreement with this strategy.  The patient has previously signed a site-specific consent form.  15-20 minutes was spent on this follow up today, and almost all of it was spent with the patient and his wife.     I appreciate the opportunity to consult on your patients.  Please call me with any questions or comments.     Sincerely,      Electronically Signed By:  HUMERA Slaughter/Rae  DD:  03/29/2022  DT:  03/29/2022  Job #:  [id number]    CC:  Go Raza M.D., [address] [facility] [place] [address], Fax: [phone]        Lv Snow M.D., [address] [facility] [place] [address], Fax: [phone]        Dustin Thompson MD, [address] [facility], [place] [address], Fax: [phone]

## 2022-04-04 ENCOUNTER — TELEPHONE (OUTPATIENT)
Dept: UROLOGY | Facility: CLINIC | Age: 67
End: 2022-04-04
Payer: MEDICARE

## 2022-04-04 NOTE — TELEPHONE ENCOUNTER
----- Message from Lv Collins MD sent at 3/28/2022  2:59 PM CDT -----  Regarding: Bladder outlet obstruction SX  Garrett / Herber,    I saw your patient today.  He has an IPSS of 17 and I suspect that he would have improvement with a Flomax type med.  The patient will start RT in about 2 weeks and improving his bladder outlet obstruction SX would be helpful.     I told the patient that you would be the best to make the decision about RX Flomax type med.    Thanks.    Genaro

## 2022-04-05 ENCOUNTER — LAB VISIT (OUTPATIENT)
Dept: LAB | Facility: HOSPITAL | Age: 67
End: 2022-04-05
Attending: FAMILY MEDICINE
Payer: MEDICARE

## 2022-04-05 DIAGNOSIS — E11.9 TYPE 2 DIABETES MELLITUS WITHOUT COMPLICATION, WITHOUT LONG-TERM CURRENT USE OF INSULIN: ICD-10-CM

## 2022-04-05 LAB
ESTIMATED AVG GLUCOSE: 174 MG/DL (ref 68–131)
HBA1C MFR BLD: 7.7 % (ref 4–5.6)

## 2022-04-05 PROCEDURE — 36415 COLL VENOUS BLD VENIPUNCTURE: CPT | Mod: PO | Performed by: FAMILY MEDICINE

## 2022-04-05 PROCEDURE — 83036 HEMOGLOBIN GLYCOSYLATED A1C: CPT | Performed by: FAMILY MEDICINE

## 2022-04-06 ENCOUNTER — TELEPHONE (OUTPATIENT)
Dept: FAMILY MEDICINE | Facility: CLINIC | Age: 67
End: 2022-04-06
Payer: MEDICARE

## 2022-04-06 DIAGNOSIS — E11.9 TYPE 2 DIABETES MELLITUS WITHOUT COMPLICATION, WITHOUT LONG-TERM CURRENT USE OF INSULIN: Primary | ICD-10-CM

## 2022-04-06 NOTE — TELEPHONE ENCOUNTER
----- Message from Go Raza MD sent at 4/5/2022  4:26 PM CDT -----  Sugar borderline, increased from previously. Watch diet, continue current meds. Repeat hgba1c in 3 mon.   My nurse will contact you to arrange.  Thanks,  Dr. Raza

## 2022-04-26 ENCOUNTER — PATIENT MESSAGE (OUTPATIENT)
Dept: ADMINISTRATIVE | Facility: HOSPITAL | Age: 67
End: 2022-04-26
Payer: MEDICARE

## 2022-04-27 ENCOUNTER — PATIENT OUTREACH (OUTPATIENT)
Dept: ADMINISTRATIVE | Facility: HOSPITAL | Age: 67
End: 2022-04-27
Payer: MEDICARE

## 2022-04-27 NOTE — LETTER
AUTHORIZATION FOR RELEASE OF   CONFIDENTIAL INFORMATION      We are seeing Merrill Cummings, date of birth 1955, in the clinic at [facility]. Go Raza MD is the patient's PCP. Merrill Cummings has an outstanding lab/procedure at the time we reviewed his chart. In order to help keep his health information updated, he has authorized us to request the following medical record(s):        (  )  MAMMOGRAM                                      (  )  COLONOSCOPY      (  )  PAP SMEAR                                          (  )  OUTSIDE LAB RESULTS     (  )  DEXA SCAN                                          ( X )  EYE EXAM            (  )  FOOT EXAM                                          (  )  ENTIRE RECORD     (  )  OUTSIDE IMMUNIZATIONS                 (  )  _______________         Please fax records to [facility], [phone]     If you have any questions, please contact Ciaran Lujan           Patient Name: Merrill Cummings  : 1955  Patient Phone #: [phone]

## 2022-07-01 ENCOUNTER — OFFICE VISIT (OUTPATIENT)
Dept: FAMILY MEDICINE | Facility: CLINIC | Age: 67
End: 2022-07-01
Payer: MEDICARE

## 2022-07-01 DIAGNOSIS — R05.9 COUGH: ICD-10-CM

## 2022-07-01 DIAGNOSIS — U07.1 COVID-19: Primary | ICD-10-CM

## 2022-07-01 DIAGNOSIS — R06.02 SOB (SHORTNESS OF BREATH): ICD-10-CM

## 2022-07-01 PROCEDURE — 99443 PR PHYSICIAN TELEPHONE EVALUATION 21-30 MIN: CPT | Mod: CR,95,, | Performed by: NURSE PRACTITIONER

## 2022-07-01 PROCEDURE — 99443 PR PHYSICIAN TELEPHONE EVALUATION 21-30 MIN: ICD-10-PCS | Mod: CR,95,, | Performed by: NURSE PRACTITIONER

## 2022-07-01 RX ORDER — PROMETHAZINE HYDROCHLORIDE AND DEXTROMETHORPHAN HYDROBROMIDE 6.25; 15 MG/5ML; MG/5ML
5 SYRUP ORAL 2 TIMES DAILY PRN
Qty: 118 ML | Refills: 0 | Status: SHIPPED | OUTPATIENT
Start: 2022-07-01 | End: 2022-07-11

## 2022-07-01 RX ORDER — ALBUTEROL SULFATE 90 UG/1
2 AEROSOL, METERED RESPIRATORY (INHALATION) EVERY 6 HOURS PRN
Qty: 18 G | Refills: 0 | Status: SHIPPED | OUTPATIENT
Start: 2022-07-01 | End: 2022-08-03

## 2022-07-01 NOTE — PATIENT INSTRUCTIONS
Hold CRESTOR while taking paxlovid  Hydrate well  Rest  Tylenol OTC as directed  Monitor blood sugars carefully while taking phenergan DM; stop if  or more  Phenergan DM causes drowsiness  Albuterol can cause jitteriness, palpitations  Report to ER immediately if symptoms worsen or persist    Instructions for Patients with Confirmed or Suspected COVID-19    If you are awaiting your test result, you will either be called or it will be released to the patient portal.  If you have any questions about your test, please visit [email or web address] or call our COVID-19 information line at [phone].      Please isolate yourself at home.  You may leave home and/or return to work once the following conditions are met:    If you have symptoms and tested positive:  More than 5 days since symptoms first appeared AND  More than 24 hours fever free without medications AND       symptoms have improved   For five days after ending isolation, masks are required.    If you had no symptoms but tested positive:  More than 5 days since the date of the first positive test. If you develop symptoms, then use the guidelines above  For five days after ending isolation, masks are required.      Testing is not recommended if you are symptom free after completing isolation.

## 2022-07-02 ENCOUNTER — PATIENT MESSAGE (OUTPATIENT)
Dept: FAMILY MEDICINE | Facility: CLINIC | Age: 67
End: 2022-07-02
Payer: MEDICARE

## 2022-07-02 NOTE — PROGRESS NOTES
Subjective:       Patient ID: Merrill Cummings is a 67 y.o. male.    Chief Complaint: No chief complaint on file.  The patient location is: [place]  The chief complaint leading to consultation is: Positive home COVID-19 test    Visit type: audio only    Face to Face time with patient: 21 min  minutes of total time spent on the encounter, which includes face to face time and non-face to face time preparing to see the patient (eg, review of tests), Obtaining and/or reviewing separately obtained history, Documenting clinical information in the electronic or other health record, Independently interpreting results (not separately reported) and communicating results to the patient/family/caregiver, or Care coordination (not separately reported).         Each patient to whom he or she provides medical services by telemedicine is:  (1) informed of the relationship between the physician and patient and the respective role of any other health care provider with respect to management of the patient; and (2) notified that he or she may decline to receive medical services by telemedicine and may withdraw from such care at any time.    Notes:     Cough  This is a new (Pt's wife diagnosed with COVID-19 on Wednesday; pt states his symptoms began on yesterday; states home COVID-19 test positive today) problem. The current episode started yesterday. The problem has been unchanged. The problem occurs every few minutes. The cough is productive of sputum. Associated symptoms include a fever (states 101) and wheezing. Pertinent negatives include no chest pain, chills, ear congestion, ear pain, headaches, heartburn, hemoptysis, myalgias, nasal congestion, postnasal drip, rash, rhinorrhea, sore throat, shortness of breath, sweats or weight loss. Associated symptoms comments: fatigue. Nothing aggravates the symptoms. He has tried nothing for the symptoms. The treatment provided no relief. There is no history of asthma, bronchiectasis,  bronchitis, COPD, emphysema, environmental allergies or pneumonia.     Past Medical History:   Diagnosis Date    Charcot Mague Tooth muscular atrophy 2007    His father had it. Pt started with dropped foot and poor pectoral muscles. able to walk well.     Colon polyp     Diabetes mellitus, type 2     Elevated PSA     Foot drop, bilateral     Gait abnormality     Gout     right ankle    H. 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     [place] [employer] [place] [employer]    Occupation: avid bicycle rider, rides twice a day     Comment: 10 miles a day    Tobacco Use    Smoking status: Never Smoker    Smokeless tobacco: Never Used   Substance and Sexual Activity    Alcohol use: No    Drug use: No    Sexual activity: Never     Social Determinants of Health     Financial Resource Strain: Low Risk     Difficulty of Paying Living Expenses: Not hard at all   Food Insecurity: No Food Insecurity    Worried About Running Out of Food in the Last Year: Never true    Ran Out of Food in the Last Year: Never true   Transportation Needs: No Transportation Needs    Lack of Transportation (Medical): No    Lack of Transportation (Non-Medical): No   Physical Activity: Insufficiently Active    Days of Exercise per Week: 5 days    Minutes of Exercise per Session: 20 min   Stress: No Stress Concern Present    Feeling of Stress : Only a little   Social Connections: Unknown    Frequency of Communication with Friends and  "Family: Three times a week    Frequency of Social Gatherings with Friends and Family: Once a week    Active Member of Clubs or Organizations: No    Attends Club or Organization Meetings: Never    Marital Status:    Housing Stability: Low Risk     Unable to Pay for Housing in the Last Year: No    Number of Places Lived in the Last Year: 1    Unstable Housing in the Last Year: No     Past Surgical History:   Procedure Laterality Date    ANKLE FRACTURE SURGERY      CIRCUMCISION      COLONOSCOPY  ~2004 or 05  ([place]?  [place]?)    "Nothing"    COLONOSCOPY N/A 10/2/2020    Procedure: COLONOSCOPY;  Surgeon: Vernon Houser Jr., MD;  Location: [facility] ENDO;  Service: Endoscopy;  Laterality: N/A;    COLONOSCOPY W/ POLYPECTOMY  04/15/2015    SARAH.   Two 1 to 3 mm polyps in the proximal ascending colon.  TUBULAR ADENOMA.   Internal hemorrhoids.  Otherwise normalcolon and TI.    ESOPHAGOGASTRODUODENOSCOPY      EXTRACORPOREAL SHOCK WAVE LITHOTRIPSY  2002, 2004    x 2 - dr chivo joshi ([facility])    FACIAL LACERATIONS REPAIR      as a child    MULTIPLE TOOTH EXTRACTIONS      ORIF ankle fracture  1988    PROSTATE BIOPSY      X2       Review of Systems   Constitutional: Positive for fatigue and fever (states 101). Negative for chills and weight loss.   HENT: Negative.  Negative for ear pain, postnasal drip, rhinorrhea and sore throat.    Eyes: Negative.    Respiratory: Positive for cough and wheezing. Negative for hemoptysis and shortness of breath.    Cardiovascular: Negative.  Negative for chest pain.   Gastrointestinal: Negative.  Negative for heartburn.   Endocrine: Negative.    Genitourinary: Negative.    Musculoskeletal: Negative.  Negative for myalgias.   Integumentary:  Negative for rash. Negative.   Allergic/Immunologic: Negative.  Negative for environmental allergies.   Neurological: Negative.  Negative for headaches.   Psychiatric/Behavioral: Negative.          Objective:      Physical Exam "    Assessment:       Problem List Items Addressed This Visit    None     Visit Diagnoses     COVID-19    -  Primary    SOB (shortness of breath)        Cough              Plan:           Diagnoses and all orders for this visit:    COVID-19  SOB (shortness of breath)  Cough  -     albuterol (PROVENTIL/VENTOLIN HFA) 90 mcg/actuation inhaler; Inhale 2 puffs into the lungs every 6 (six) hours as needed for Wheezing or Shortness of Breath.  -     promethazine-dextromethorphan (PROMETHAZINE-DM) 6.25-15 mg/5 mL Syrp; Take 5 mLs by mouth 2 (two) times daily as needed.  -     nirmatrelvir-ritonavir 150 mg x 2- 100 mg copackaged tablets (EUA); Take 3 tablets by mouth 2 (two) times daily for 5 days. Each dose contains 2 nirmatrelvir (pink tablets) and 1 ritonavir (white tablet). Take all 3 tablets together  -     COVID-19 Home Symptom Monitoring  - Duration (days): 14  Hold CRESTOR while taking paxlovid  Hydrate well  Rest  Tylenol OTC as directed  Monitor blood sugars carefully while taking phenergan DM; stop if  or more  COVID-19 home instructions given via AVS  Handout r/t paxlovid uses, potential side effects/interactions given and discussed  Report to ER immediately if symptoms worsen or persist

## 2022-07-05 ENCOUNTER — PATIENT MESSAGE (OUTPATIENT)
Dept: FAMILY MEDICINE | Facility: CLINIC | Age: 67
End: 2022-07-05
Payer: MEDICARE

## 2022-07-08 ENCOUNTER — NURSE TRIAGE (OUTPATIENT)
Dept: ADMINISTRATIVE | Facility: CLINIC | Age: 67
End: 2022-07-08
Payer: MEDICARE

## 2022-07-08 ENCOUNTER — LAB VISIT (OUTPATIENT)
Dept: LAB | Facility: HOSPITAL | Age: 67
End: 2022-07-08
Attending: FAMILY MEDICINE
Payer: MEDICARE

## 2022-07-08 DIAGNOSIS — E11.9 TYPE 2 DIABETES MELLITUS WITHOUT COMPLICATION, WITHOUT LONG-TERM CURRENT USE OF INSULIN: ICD-10-CM

## 2022-07-08 DIAGNOSIS — C61 MALIGNANT NEOPLASM OF PROSTATE: Primary | ICD-10-CM

## 2022-07-08 LAB
COMPLEXED PSA SERPL-MCNC: 0.02 NG/ML (ref 0–4)
ESTIMATED AVG GLUCOSE: 163 MG/DL (ref 68–131)
HBA1C MFR BLD: 7.3 % (ref 4–5.6)

## 2022-07-08 PROCEDURE — 83036 HEMOGLOBIN GLYCOSYLATED A1C: CPT | Performed by: FAMILY MEDICINE

## 2022-07-08 PROCEDURE — 36415 COLL VENOUS BLD VENIPUNCTURE: CPT | Mod: PO | Performed by: RADIOLOGY

## 2022-07-08 PROCEDURE — 84153 ASSAY OF PSA TOTAL: CPT | Performed by: RADIOLOGY

## 2022-07-08 NOTE — TELEPHONE ENCOUNTER
Pt contacted for [facility] escalation - Pt has questions regarding ending home isolation. NT able to answer all questions.  Denies any fever or SOB, pt able to speak in full sentences without noted SOB or cough. Covid 19 protocol used and pt advised on home care. Pt instructed to call [facility] for worsening of symptoms or health questions.    Reason for Disposition   COVID-19 Home Isolation, questions about    Protocols used: CORONAVIRUS (COVID-19) DIAGNOSED OR [id number]

## 2022-07-15 ENCOUNTER — DOCUMENTATION ONLY (OUTPATIENT)
Dept: ADMINISTRATIVE | Facility: OTHER | Age: 67
End: 2022-07-15
Payer: MEDICARE

## 2022-07-18 ENCOUNTER — PATIENT MESSAGE (OUTPATIENT)
Dept: FAMILY MEDICINE | Facility: CLINIC | Age: 67
End: 2022-07-18
Payer: MEDICARE

## 2022-07-18 ENCOUNTER — TELEPHONE (OUTPATIENT)
Dept: UROLOGY | Facility: CLINIC | Age: 67
End: 2022-07-18
Payer: MEDICARE

## 2022-07-18 DIAGNOSIS — C61 PROSTATE CANCER: Primary | ICD-10-CM

## 2022-07-18 NOTE — TELEPHONE ENCOUNTER
----- Message from Mick Martino sent at 7/18/2022 11:37 AM CDT -----  Contact: Pt  Type: Needs Medical Advice    Who Called:Pt  Best Call Back Number:[phone]    Additional Information Requesting a call back regarding Pt was calling in regards to there Lupron injection pt was calling to get appt scheduled pt stated they will be due for another in August pt stated to please call Thank you  Please Advise-Thank you

## 2022-07-18 NOTE — TELEPHONE ENCOUNTER
I put in an order for PSA to schedule in January.  This would usually be followed by his urologist.  Looks like he was seeing Dr. Edmonds who retired.  I put in referral for Urology.

## 2022-07-20 NOTE — PROGRESS NOTES
07/15/2022      Anil Barr M.D.   [address]   [place] [address]      RE: Merrill Cummings    MR#:  [id number]    :  1955       DX: 1.  High-risk adenocarcinoma of the prostate diagnosed in 2021.  Group clinical   stage IIIA. cT1c cN0 cM0, Amaury score 4+3=7.  PSA = 21.   Patient's father and grandfather  had prostate cancer.      2.         History of Charcot Mague Tooth muscular dystrophy.       Dear Herber:     Your patient returns one month after completing definitive pelvic irradiation delivered with 1.5 years of androgen deprivation therapy.  Since completing irradiation a month ago, he has done well.  He did develop COVID on , but has recovered from that.  On , PSA was 0.02.     The patient said his bowel and bladder function is almost back to normal.  He does have some hot flashes.  He thinks that his Charcot-Mague-Tooth muscular dystrophy is fairly stable.     Physical exam shows a man in no distress.  He continues to have a good performance status.  Digital rectal examination is not performed since he has not had previously palpable disease.     Assessment and Plan:  Symptomatically, he is doing well and his current PSA is 0.02.  In late August, the patient will receive his second of three six-month androgen deprivation therapy shots.  At the time of initial diagnosis, I had talked with the patient the possibility of receiving only one year, instead of 1.5 years, of androgen deprivation therapy because of his muscular dystrophy; however, the patient thinks his Charcot-Mague-Tooth muscular dystrophy is stable and he will go ahead with the 1.5 years of androgen deprivation therapy.  The patient said he will be getting his PSAs done every six months at Dr. Raza'.  The patient will be calling Dr. Barr's office to get an appointment for his next LHRH agonist injection.  I have asked to see the patient in nine months.     I appreciate the opportunity to consult  on your patients.  Please call me with any questions or comments.     Sincerely,          Electronically Signed By:  Genaro Collins M.D.                                                         GH/MedQ  DD:  07/15/2022  DT:  07/18/2022  Job #:  [id number]    CC:  Anil Barr M.D., [address], [place] [address], Fax: [phone]        Lv Snow M.D., [address] [place] [address], Fax: [phone]        Dustin Thompson MD, [address] [facility] [place] [address], Fax: [phone]        Go Raza M.D., [address] [facility] [place] [address], Fax: [phone]

## 2022-08-03 ENCOUNTER — PATIENT MESSAGE (OUTPATIENT)
Dept: FAMILY MEDICINE | Facility: CLINIC | Age: 67
End: 2022-08-03
Payer: MEDICARE

## 2022-08-17 ENCOUNTER — PATIENT MESSAGE (OUTPATIENT)
Dept: INFUSION THERAPY | Facility: HOSPITAL | Age: 67
End: 2022-08-17
Payer: MEDICARE

## 2022-08-17 ENCOUNTER — TELEPHONE (OUTPATIENT)
Dept: INFUSION THERAPY | Facility: HOSPITAL | Age: 67
End: 2022-08-17
Payer: MEDICARE

## 2022-08-17 NOTE — TELEPHONE ENCOUNTER
----- Message from Neri Ferguson NP sent at 8/17/2022  3:48 PM CDT -----  At present, 3 months post dental work  ----- Message -----  From: Deonna Martins MA  Sent: 8/17/2022   3:46 PM CDT  To: Neri Ferguson NP    Patient having dental work done on 08/29/22 he is getting two wisdom teeth pulled and getting an implant put same day   He is schedule for prolia on 09/02/22 he is asking how long does he need to wait into he gets his prolia after dental work

## 2022-08-22 ENCOUNTER — OFFICE VISIT (OUTPATIENT)
Dept: UROLOGY | Facility: CLINIC | Age: 67
End: 2022-08-22
Payer: MEDICARE

## 2022-08-22 VITALS — BODY MASS INDEX: 31.19 KG/M2 | HEIGHT: 69 IN

## 2022-08-22 DIAGNOSIS — C61 PROSTATE CANCER: Primary | ICD-10-CM

## 2022-08-22 LAB
BILIRUB SERPL-MCNC: NORMAL MG/DL
BLOOD URINE, POC: NORMAL
CLARITY, POC UA: CLEAR
COLOR, POC UA: YELLOW
GLUCOSE UR QL STRIP: NORMAL
KETONES UR QL STRIP: NORMAL
LEUKOCYTE ESTERASE URINE, POC: NORMAL
NITRITE, POC UA: NORMAL
PH, POC UA: 7.5
PROTEIN, POC: NORMAL
SPECIFIC GRAVITY, POC UA: 1.02
UROBILINOGEN, POC UA: 1

## 2022-08-22 PROCEDURE — 99499 UNLISTED E&M SERVICE: CPT | Mod: PBBFAC | Performed by: UROLOGY

## 2022-08-22 PROCEDURE — 81002 URINALYSIS NONAUTO W/O SCOPE: CPT | Mod: PBBFAC,PO | Performed by: UROLOGY

## 2022-08-22 PROCEDURE — 99999 PR PBB SHADOW E&M-EST. PATIENT-LVL III: CPT | Mod: PBBFAC,,, | Performed by: UROLOGY

## 2022-08-22 PROCEDURE — 99999 PR PBB SHADOW E&M-EST. PATIENT-LVL III: ICD-10-PCS | Mod: PBBFAC,,, | Performed by: UROLOGY

## 2022-08-22 PROCEDURE — 99213 OFFICE O/P EST LOW 20 MIN: CPT | Mod: PBBFAC,PO | Performed by: UROLOGY

## 2022-08-22 PROCEDURE — 99499 UNLISTED E&M SERVICE: CPT | Mod: S$PBB,,, | Performed by: UROLOGY

## 2022-08-22 PROCEDURE — 99499 NO LOS: ICD-10-PCS | Mod: S$PBB,,, | Performed by: UROLOGY

## 2022-08-22 PROCEDURE — 96402 CHEMO HORMON ANTINEOPL SQ/IM: CPT | Mod: PBBFAC | Performed by: UROLOGY

## 2022-08-22 RX ADMIN — LEUPROLIDE ACETATE 45 MG: KIT at 05:08

## 2022-08-22 NOTE — PROGRESS NOTES
Subjective:       Patient ID: Merrill Cummings is a 67 y.o. male.    Chief Complaint: Prostate Cancer    HPI     67-year-old with prostate cancer diagnosed in December 2021.  Group clinical stage IIIA. cT1c cN0 cM0, Amaury score 4+3=7.  PSA = 21.   Patient's father and grandfather  had prostate cancer.    he was treated by Dr. Hansel Almonte with a full course of radiation therapy and completed in June of 2022.  He is overall doing well.  He has no bothersome urinary symptoms.  Plan was 18 months of androgen deprivation.  He received his 1st Lupron injection in February of 2022.  He has hot flashes but is otherwise doing well.  Urine dipstick shows negative for all components.      Review of Systems   Constitutional: Negative for fever.   Genitourinary: Negative for dysuria and hematuria.       Objective:      Physical Exam  Vitals reviewed.   Constitutional:       Appearance: He is well-developed.   Pulmonary:      Effort: Pulmonary effort is normal.   Skin:     Findings: No rash.   Neurological:      Mental Status: He is alert and oriented to person, place, and time.         Assessment:       1. Prostate cancer        Plan:       Prostate cancer  -     POCT URINE DIPSTICK WITHOUT MICROSCOPE  -     Prior authorization Order    Other orders  -     leuprolide acetate (6 month) injection 45 mg      follow-up 6 months for last Lupron injection

## 2022-09-01 ENCOUNTER — PATIENT MESSAGE (OUTPATIENT)
Dept: HEMATOLOGY/ONCOLOGY | Facility: CLINIC | Age: 67
End: 2022-09-01
Payer: MEDICARE

## 2022-10-15 NOTE — TELEPHONE ENCOUNTER
Care Due:                  Date            Visit Type   Department     Provider  --------------------------------------------------------------------------------                                EP -                              PRIMARY      [facility] FAMILY  Last Visit: 11-      CARE ([facility])   MEDICINE       Go Raza  Next Visit: None Scheduled  None         None Found                                                            Last  Test          Frequency    Reason                     Performed    Due Date  --------------------------------------------------------------------------------    Office Visit  12 months..  losartan, rosuvastatin...  11- 11-    Lipid Panel.  12 months..  rosuvastatin.............  10-   10-    Health [facility] Embedded Care Gaps. Reference number: [id number]. 10/15/2022   8:51:56 AM CDT

## 2022-10-16 RX ORDER — METFORMIN HYDROCHLORIDE 1000 MG/1
TABLET ORAL
Qty: 180 TABLET | Refills: 0 | Status: SHIPPED | OUTPATIENT
Start: 2022-10-16 | End: 2022-12-27

## 2022-10-16 NOTE — TELEPHONE ENCOUNTER
"This is discharge notice for Martin Mason  Reference/Auth # [id number]  Pt discharged routine to home on 10/7/21.    Kay Zapien, RN, BSN  Utilization Review Nurse  [facility]  Direct & confidential phone # [phone]  Fax # [phone]      Martin Mason (65 y.o. Female)     Date of Birth Social Security Number Address Home Phone MRN    1956  [id number]  [place] [address] [phone] [id number]    [employer] Marital Status          None        Admission Date Admission Type Admitting Provider Attending Provider Department, Room/Bed    10/5/21 Emergency Rodrigo Go MD  [facility] NEURO HEART, 263/1    Discharge Date Discharge Disposition Discharge Destination        10/7/2021 Home or Self Care              Attending Provider: (none)   Allergies: Ibuprofen, Prednisone, Pregabalin, Tramadol, Levofloxacin, Sulfamethoxazole-trimethoprim    Isolation: None   Infection: None   Code Status: Prior    Ht: 162.6 cm (64\")   Wt: 105 kg (231 lb 7.7 oz)    Admission Cmt: None   Principal Problem: Atrial fibrillation with RVR (HCC) [I48.91]                 Active Insurance as of 10/5/2021     Primary Coverage     Payor Plan Insurance Group Employer/Plan Group    [employer] [place] - [employer] [id number]     Payor Plan Address Payor Plan Phone Number Payor Plan Fax Number Effective Dates    MAIL STOP:   4/1/2017 - None Entered    [address] [address]       [facility] [id number]       Subscriber Name Subscriber Birth Date Member ID       MARTIN MASON 1956 [id number]                 Emergency Contacts      (Rel.) Home Phone Work Phone Mobile Phone    CHRIS LEIDY (Daughter) [phone] -- [phone]    INDIOBARBARA (Spouse) [phone] -- --    ForeAgnes caceres (Daughter) -- -- [phone]               Discharge Summary      Rodrigo Go MD at 10/07/21 1152              " Refill Routing Note   Medication(s) are not appropriate for processing by [facility] for the following reason(s):      - Medication not previously prescribed by PCP    [facility] action(s):  Defer          Medication reconciliation completed: No     Appointments  past 12m or future 3m with PCP    Date Provider   Last Visit   11/8/2021 Go Raza MD   Next Visit   Visit date not found Go Raza MD   ED visits in past 90 days: 0        Note composed:8:31 PM 10/15/2022                    [facility] Medicine Services  DISCHARGE SUMMARY    Patient Name: Caterina Mason  : 1956  MRN: [id number]    Date of Admission: 10/5/2021  Date of Discharge: 10/7/2021  Condition at discharge stable  Primary Care Physician: Lou Curran MD      Presenting Problem:   Atrial fibrillation with RVR (HCC) [I48.91]  Chest pain, unspecified type [R07.9]    Active and Resolved Hospital Problems:  Active Hospital Problems    Diagnosis POA   • **Atrial fibrillation with RVR (HCC) [I48.91] Yes   • Chest pain, atypical [R07.89] Yes   • Elevated LFTs [R79.89] Yes   • Hypomagnesemia [E83.42] Yes   • Cholelithiasis [K80.20] Yes   • Class 2 obesity due to excess calories without serious comorbidity with body mass index (BMI) of 39.0 to 39.9 in adult [E66.09, Z68.39] Not Applicable   • Chronic diastolic CHF (congestive heart failure) (HCC) [I50.32] Yes   • Essential hypertension [I10] Yes   • Chronic obstructive pulmonary disease (HCC) [J44.9] Yes   • Depression [F32.A] Yes   • Gastroesophageal reflux disease [K21.9] Yes   • Sleep apnea [G47.30] Yes   • Paroxysmal atrial fibrillation (HCC) [I48.0] Yes   • Diabetic peripheral neuropathy (HCC) [E11.42] Yes   • Type 2 diabetes mellitus with hyperglycemia, with long-term current use of insulin (HCC) [E11.65, Z79.4] Not Applicable   • Mixed hyperlipidemia [E78.2] Yes      Resolved Hospital Problems   No resolved problems to display.         Hospital Course     Hospital Course:  Caterina Mason is a 65 y.o. female       History of Present Illness: Caterina Mason is a 65 y.o. female with a history of paroxysmal a-fib on chronic anticoagulation with Eliquis, chronic diastolic CHF, Type 2 DM, and sleep apnea on CPAP with supplemental oxygen who presented to [facility] ED on 10/5/2021 complaining of chest pain. The patient states her pain started last night. She states it is located in her right chest, radiates into her back, and  down her right arm. She states the pain is worse with movement and deep breaths. She reports associated shortness of breath that is worse with exertion. She denies cough, fever, or chills. She reports some leg swelling. She denies any other complaints. She states she quit smoking 2 years ago.     Workup in the ER revealed atrial fibrillation with rapid ventricular response. She was given Cardizem 20 mg IV bolus and started on continuous drip. Her magnesium was low, so she was given Mag sulfate 1 g IV. Cardiology was consulted. She was admitted to the Hospitalist group for further evaluation and treatment.     Admission patient start on Cardizem drip and initiated on metoprolol instead of CV total.  Patient seen by Dr. Charles who has seen her in the past and had previous heart catheter was negative and no further ischemic work-up was initiated except for stress test and echocardiogram  Patient heart rate now under control with metoprolol and Cardizem.  Patient continues on Eliquis.  She had right-sided chest discomfort into the right upper extremity which seems to be tender on examination today and she is advised to follow-up with orthopedic surgeon for evaluation of rotator cuff abnormalities.  Her x-ray of the right humerus was negative.  She had negative serial troponins  CT angiogram of the chest was negative for PE.  She hadRight upper quad ultrasound for elevated LFT and was negative for gallstones stable initially showed on CT scan.    Patient had 2D echo is currently pending  She had refused stress test and wants to go home   Is okay with patient discharge and follow-up for outpatient heart cath      DISCHARGE Follow Up Recommendations for labs and diagnostics: *    Currently pending echo and cardiology clearance  Follow-up with PCP     Follow-up with Dr. Charles 2 to 4 weeks or as instructed  Heart healthy diabetic diet  May resume Metformin 10/7/1931  Follow-up with outpatient orthopedic surgery for  evaluation of right shoulder pain and possible rotator cuff abnormalities.  Avoid lifting more than 5 pounds right upper extremity.  Take medication as prescribed  Follow-up with GI in 1 to 2 months for evaluation of LFT elevation.  Might be related to possible Josue.    Reasons For Change In Medications and Indications for New Medications:  Cardiology recommendation    Day of Discharge     Vital Signs:  Temp:  [98.2 °F (36.8 °C)-98.4 °F (36.9 °C)] 98.2 °F (36.8 °C)  Heart Rate:  [74-96] 85  Resp:  [16-19] 19  BP: (117-152)/() 132/52  Flow (L/min):  [2-3] 2    Physical Exam:  Physical Exam   refer to progress note    Pertinent  and/or Most Recent Results     LAB RESULTS:      Lab 10/06/21  0448 10/05/21  0519   WBC 9.20 12.40*   HEMOGLOBIN 14.1 15.8   HEMATOCRIT 42.5 47.6*   PLATELETS 284 323   NEUTROS ABS 4.60 9.00*   LYMPHS ABS 3.10 2.10   MONOS ABS 1.30* 1.20*   EOS ABS 0.10 0.10   MCV 87.0 86.6   PROCALCITONIN  --  0.11   PROTIME  --  11.4   APTT  --  29.0         Lab 10/06/21  0448 10/05/21  0519   SODIUM 136 138   POTASSIUM 3.7 4.5   CHLORIDE 99 99   CO2 22.0 22.0   ANION GAP 15.0 17.0*   BUN 18 18   CREATININE 0.78 0.94   GLUCOSE 310* 356*   CALCIUM 9.5 10.4   MAGNESIUM  --  1.4*   TSH  --  1.830         Lab 10/06/21  0448 10/05/21  0519   TOTAL PROTEIN 7.7 8.2   ALBUMIN 4.00 4.40   GLOBULIN  --  3.8   ALT (SGPT) 39* 54*   AST (SGOT) 31 51*   BILIRUBIN 1.1 0.8   INDIRECT BILIRUBIN 0.9  --    BILIRUBIN DIRECT 0.2  --    ALK PHOS 75 81         Lab 10/05/21  1209 10/05/21  0519   PROBNP  --  1,170.0*   TROPONIN T <0.010 <0.010   PROTIME  --  11.4   INR  --  1.03                 Brief Urine Lab Results  (Last result in the past 365 days)      Color   Clarity   Blood   Leuk Est   Nitrite   Protein   CREAT   Urine HCG        05/20/21 0740             14.2           Microbiology Results (last 10 days)     Procedure Component Value - Date/Time    COVID-19,CEPHEID/FRANDY/BDMAX,COR/FORTUNATO/PAD/[place] IN-HOUSE(OR  EMERGENT/ADD-ON),NP SWAB IN TRANSPORT MEDIA 3-4 HR TAT, RT-PCR - Swab, Nasopharynx [[id number]]  (Normal) Collected: 10/05/21 0505    Lab Status: Final result Specimen: Swab from Nasopharynx Updated: 10/05/21 0643     COVID19 Not Detected    Narrative:      Fact sheet for providers: https://www.fda.gov/media/263469/download     Fact sheet for patients: https://www.fda.gov/media/944290/download  Fact sheet for providers: https://www.fda.gov/media/159054/download     Fact sheet for patients: https://www.fda.gov/media/568416/download          XR Humerus Right    Result Date: 10/6/2021  Impression: Negative for acute osseous abnormality.  Electronically Signed By-Hardik Woodson MD On:10/6/2021 1:26 PM This report was finalized on [id number] by  Hardik Woodson MD.    XR Chest 1 View    Result Date: 10/5/2021  Impression: Marked cardiomegaly. No acute cardiopulmonary process.   Electronically Signed By-Rudy Lopez DO On:10/5/2021 7:17 AM This report was finalized on [id number] by  Rudy Lopez DO.    CT Chest Pulmonary Embolism    Result Date: 10/5/2021  Impression: 1. No pulmonary embolism. 2. Mild bilateral upper lobe medial subsegmental atelectasis. 3. Mild to moderate cardiomegaly. 4. Marked diffuse hepatic steatosis. 5. Uncomplicated cholelithiasis.    Electronically Signed By-Emeli Corona MD On:10/5/2021 8:41 AM This report was finalized on [id number] by  Emeli Corona MD.    US Abdomen Limited    Result Date: 10/5/2021  Impression: No acute sonographic abnormality within the right upper quadrant.  Electronically Signed By-Rudy Lopez DO On:10/5/2021 12:03 PM This report was finalized on [id number] by  Rudy Lopez DO.              Results for orders placed during the hospital encounter of 03/10/20    Adult Transthoracic Echo Complete W/ Cont if Necessary Per Protocol    Interpretation Summary  · Left ventricular systolic function is normal.  · Estimated EF = 55%.      Labs Pending at  Discharge:      Procedures Performed           Consults:   Consults     Date and Time Order Name Status Description    10/5/2021  9:40 AM Cardiology (on-call MD unless specified) Completed     10/5/2021  9:40 AM Hospitalist (on-call MD unless specified) Completed             Discharge Details        Discharge Medications      New Medications      Instructions Start Date   Baclofen 5 MG tablet   5 mg, Oral, 3 Times Daily PRN      metoprolol tartrate 25 MG tablet  Commonly known as: LOPRESSOR   25 mg, Oral, Every 12 Hours Scheduled         Changes to Medications      Instructions Start Date   dilTIAZem  MG 24 hr capsule  Commonly known as: CARDIZEM CD  What changed: when to take this   120 mg, Oral, Every 24 Hours Scheduled   Start Date: October 7, 2021        Continue These Medications      Instructions Start Date   albuterol sulfate  (90 Base) MCG/ACT inhaler  Commonly known as: PROVENTIL HFA;VENTOLIN HFA;PROAIR HFA   2 puffs, Inhalation, Every 4 Hours PRN      apixaban 5 MG tablet tablet  Commonly known as: ELIQUIS   5 mg, Oral, 2 Times Daily      atorvastatin 20 MG tablet  Commonly known as: LIPITOR   20 mg, Oral, Daily      BASAGLAR KWIKPEN 100 UNIT/ML injection pen   32 Units, Subcutaneous, Nightly      cholecalciferol 25 MCG (1000 UT) tablet  Commonly known as: VITAMIN D3   1,000 Units, Oral, Daily      fenofibrate 160 MG tablet   160 mg, Oral, Daily      furosemide 40 MG tablet  Commonly known as: LASIX   40 mg, Oral, Daily      HYDROcodone-acetaminophen 7.5-325 MG per tablet  Commonly known as: NORCO   1 tablet, Oral, 3 Times Daily PRN      insulin lispro 100 UNIT/ML injection  Commonly known as: humaLOG   12 Units, Subcutaneous, 3 Times Daily Before Meals      Janumet XR  MG tablet  Generic drug: SITagliptin-metFORMIN HCl ER   2 tablets, Oral, Daily      lisinopril 20 MG tablet  Commonly known as: PRINIVIL,ZESTRIL   20 mg, Oral, Daily      magnesium oxide 400 (241.3 Mg) MG tablet  tablet  Commonly known as: MAGOX   400 mg, Oral, 2 Times Daily      potassium chloride 10 MEQ CR tablet  Commonly known as: K-DUR,KLOR-CON   10 mEq, Oral, Daily         Stop These Medications    acebutolol 200 MG capsule  Commonly known as: SECTRAL            Allergies   Allergen Reactions   • Ibuprofen GI Intolerance   • Prednisone Other (See Comments)   • Pregabalin Other (See Comments)     jerking   • Tramadol Other (See Comments)     Legs jerked   • Levofloxacin Other (See Comments)     Increase sunburn   • Sulfamethoxazole-Trimethoprim Swelling         Discharge Disposition:     Home or Self Care    Diet:  Hospital:  Diet Order   Procedures   • Diet Cardiac, Diabetic/Consistent Carbs; 2gm Na+; Diabetic - Consistent Carb; No Caffeine         Discharge Activity:         CODE STATUS:  Code Status and Medical Interventions:   Ordered at: 10/05/21 1033     Code Status:    CPR     Medical Interventions (Level of Support Prior to Arrest):    Full         Future Appointments   Date Time Provider Department Center   11/1/2021  9:00 AM SOSA Sprague DPM [facility] PODIATRY [place]   11/16/2021  8:20 AM LAB  [facility] None   11/23/2021  2:00 PM Bautista Archibald MD [facility] END NA [place]   3/18/2022  1:45 PM Jesse Charles MD [facility] CARD CD [place]           Time spent on Discharge including face to face service:  33* minutes    This patient has been examined wearing appropriate Personal Protective Equipment and discussed with hospital infection control department. 10/06/21      Signature: Electronically signed by Rodrigo Go MD, 10/06/21, 2:17 PM EDT.  [facility] Hospitalist Team      Electronically signed by Rodrigo Go MD at 10/07/21 1867

## 2022-10-18 ENCOUNTER — PATIENT MESSAGE (OUTPATIENT)
Dept: ADMINISTRATIVE | Facility: HOSPITAL | Age: 67
End: 2022-10-18
Payer: MEDICARE

## 2022-10-19 DIAGNOSIS — E11.9 TYPE 2 DIABETES MELLITUS WITHOUT COMPLICATION: ICD-10-CM

## 2022-11-04 NOTE — ANESTHESIA RELEASE NOTE
"Anesthesia Release from PACU Note    Patient: Merrill Cummings    Procedure(s) Performed: Procedure(s) (LRB):  ESOPHAGOGASTRODUODENOSCOPY (EGD) (N/A)    Anesthesia type: MAC    Post pain: Adequate analgesia    Post assessment: no apparent anesthetic complications, tolerated procedure well and no evidence of recall    Last Vitals:   Visit Vitals  /79   Pulse 72   Temp 36.8 °C (98.2 °F) (Oral)   Resp 18   Ht 5' 10" (1.778 m)   Wt 91.2 kg (201 lb)   SpO2 100%   BMI 28.84 kg/m²       Post vital signs: stable    Level of consciousness: awake, alert  and oriented    Nausea/Vomiting: no nausea/no vomiting    Complications: none    Airway Patency: patent    Respiratory: unassisted, spontaneous ventilation, room air    Cardiovascular: stable and blood pressure at baseline    Hydration: euvolemic  "
No

## 2022-11-07 ENCOUNTER — PATIENT MESSAGE (OUTPATIENT)
Dept: FAMILY MEDICINE | Facility: CLINIC | Age: 67
End: 2022-11-07
Payer: MEDICARE

## 2022-11-07 DIAGNOSIS — Z79.899 ENCOUNTER FOR LONG-TERM (CURRENT) USE OF MEDICATIONS: ICD-10-CM

## 2022-11-07 DIAGNOSIS — E11.9 TYPE 2 DIABETES MELLITUS WITHOUT COMPLICATION, WITHOUT LONG-TERM CURRENT USE OF INSULIN: ICD-10-CM

## 2022-11-07 DIAGNOSIS — E78.5 HYPERLIPIDEMIA, UNSPECIFIED HYPERLIPIDEMIA TYPE: Primary | ICD-10-CM

## 2022-11-17 DIAGNOSIS — E78.9 BORDERLINE HIGH CHOLESTEROL: ICD-10-CM

## 2022-11-17 RX ORDER — ROSUVASTATIN CALCIUM 20 MG/1
20 TABLET, COATED ORAL DAILY
Qty: 90 TABLET | Refills: 0 | Status: SHIPPED | OUTPATIENT
Start: 2022-11-17 | End: 2023-02-07

## 2022-11-17 RX ORDER — LOSARTAN POTASSIUM 100 MG/1
100 TABLET ORAL DAILY
Qty: 90 TABLET | Refills: 0 | Status: SHIPPED | OUTPATIENT
Start: 2022-11-17 | End: 2023-02-07

## 2022-11-17 NOTE — TELEPHONE ENCOUNTER
Refill Routing Note   Medication(s) are not appropriate for processing by [facility] for the following reason(s):      - Required laboratory values are outdated  - Required vitals are abnormal    [facility] action(s):  Defer Medication-related problems identified: Requires labs        Medication reconciliation completed: No     Appointments  past 12m or future 3m with PCP    Date Provider   Last Visit   11/8/2021 Go Raza MD   Next Visit   Visit date not found Go Raza MD   ED visits in past 90 days: 0        Note composed:11:22 AM 11/17/2022

## 2022-11-17 NOTE — TELEPHONE ENCOUNTER
Care Due:                  Date            Visit Type   Department     Provider  --------------------------------------------------------------------------------                                EP -                              PRIMARY      [facility] FAMILY  Last Visit: 11-      CARE ([facility])   MEDICINE       Go Raza  Next Visit: None Scheduled  None         None Found                                                            Last  Test          Frequency    Reason                     Performed    Due Date  --------------------------------------------------------------------------------    HBA1C.......  6 months...  metFORMIN................  07- 01-    [facility] Embedded Care Gaps. Reference number: [id number]. 11/17/2022   2:01:02 AM CST

## 2022-11-28 ENCOUNTER — PATIENT MESSAGE (OUTPATIENT)
Dept: HEMATOLOGY/ONCOLOGY | Facility: CLINIC | Age: 67
End: 2022-11-28
Payer: MEDICARE

## 2022-11-28 ENCOUNTER — LAB VISIT (OUTPATIENT)
Dept: LAB | Facility: HOSPITAL | Age: 67
End: 2022-11-28
Attending: FAMILY MEDICINE
Payer: MEDICARE

## 2022-11-28 DIAGNOSIS — E11.9 TYPE 2 DIABETES MELLITUS WITHOUT COMPLICATION, WITHOUT LONG-TERM CURRENT USE OF INSULIN: ICD-10-CM

## 2022-11-28 LAB
ALBUMIN/CREAT UR: 17 UG/MG (ref 0–30)
CREAT UR-MCNC: 100 MG/DL (ref 23–375)
MICROALBUMIN UR DL<=1MG/L-MCNC: 17 UG/ML

## 2022-11-28 PROCEDURE — 82043 UR ALBUMIN QUANTITATIVE: CPT | Performed by: FAMILY MEDICINE

## 2022-11-28 PROCEDURE — 82570 ASSAY OF URINE CREATININE: CPT | Performed by: FAMILY MEDICINE

## 2022-11-30 ENCOUNTER — INFUSION (OUTPATIENT)
Dept: INFUSION THERAPY | Facility: HOSPITAL | Age: 67
End: 2022-11-30
Attending: NURSE PRACTITIONER
Payer: MEDICARE

## 2022-11-30 ENCOUNTER — OFFICE VISIT (OUTPATIENT)
Dept: HEMATOLOGY/ONCOLOGY | Facility: CLINIC | Age: 67
End: 2022-11-30
Payer: MEDICARE

## 2022-11-30 VITALS
SYSTOLIC BLOOD PRESSURE: 144 MMHG | BODY MASS INDEX: 32.13 KG/M2 | DIASTOLIC BLOOD PRESSURE: 91 MMHG | HEIGHT: 69 IN | TEMPERATURE: 98 F | HEART RATE: 68 BPM | RESPIRATION RATE: 18 BRPM | WEIGHT: 216.94 LBS | OXYGEN SATURATION: 95 %

## 2022-11-30 VITALS
WEIGHT: 216.94 LBS | BODY MASS INDEX: 32.13 KG/M2 | HEIGHT: 69 IN | RESPIRATION RATE: 18 BRPM | SYSTOLIC BLOOD PRESSURE: 144 MMHG | TEMPERATURE: 98 F | HEART RATE: 68 BPM | DIASTOLIC BLOOD PRESSURE: 91 MMHG

## 2022-11-30 DIAGNOSIS — Z79.818 ANDROGEN DEPRIVATION THERAPY: ICD-10-CM

## 2022-11-30 DIAGNOSIS — M85.80 OSTEOPENIA, UNSPECIFIED LOCATION: ICD-10-CM

## 2022-11-30 DIAGNOSIS — Z79.818 ANDROGEN DEPRIVATION THERAPY: Primary | ICD-10-CM

## 2022-11-30 DIAGNOSIS — C61 PROSTATE CANCER: Primary | ICD-10-CM

## 2022-11-30 PROCEDURE — 99214 OFFICE O/P EST MOD 30 MIN: CPT | Mod: PBBFAC,25,PN | Performed by: NURSE PRACTITIONER

## 2022-11-30 PROCEDURE — 63600175 PHARM REV CODE 636 W HCPCS: Mod: JG,PN | Performed by: INTERNAL MEDICINE

## 2022-11-30 PROCEDURE — 99215 OFFICE O/P EST HI 40 MIN: CPT | Mod: S$PBB,,, | Performed by: NURSE PRACTITIONER

## 2022-11-30 PROCEDURE — 99215 PR OFFICE/OUTPT VISIT, EST, LEVL V, 40-54 MIN: ICD-10-PCS | Mod: S$PBB,,, | Performed by: NURSE PRACTITIONER

## 2022-11-30 PROCEDURE — 96372 THER/PROPH/DIAG INJ SC/IM: CPT | Mod: PN

## 2022-11-30 PROCEDURE — 99999 PR PBB SHADOW E&M-EST. PATIENT-LVL IV: ICD-10-PCS | Mod: PBBFAC,,, | Performed by: NURSE PRACTITIONER

## 2022-11-30 PROCEDURE — 99999 PR PBB SHADOW E&M-EST. PATIENT-LVL IV: CPT | Mod: PBBFAC,,, | Performed by: NURSE PRACTITIONER

## 2022-11-30 RX ADMIN — DENOSUMAB 60 MG: 60 INJECTION SUBCUTANEOUS at 11:11

## 2022-11-30 NOTE — PROGRESS NOTES
Subjective:      Name: Merrill Cummings  : 1955  MRN: [id number]    CC:  Evaluation for #2 of 3 Prolia    HPI:   Merrill Cummings is a 67 y.o. male presents for evaluation of Establish Care (6 months labs  prolia)  Patient with a history of prostate CA, Charcot-Mague-Tooth, GERD, T2DM, Tremors, HTN.  Urology:  Dr. Barr  PCP:  Dr. Raza    Patient with a recent diagnosis of Adenocarcinoma of the prostate after a history of elevated PSA & several negative prostate biopsies.  Followed in Urology with Dr. Barr (prior Dr. Edmonds); placed on Leupron & XRT (ended )  1st Leupron:  22, #2 22  #3 due in     Consult to [facility] for bone management while on androgen deprivation therapy.  BMD 22:  Osteopenia in lumbar spine (T-score = -1.8) & femoral neck (T-score = -1.1).    Consult to Dr. Snow 22 & placed on Prolia x 3 through treatment.  1st Prolia received 22  2nd delayed due to oral surgery:  (2) wisdom teeth removed & (1) implant placed on 22    Presents to the clinic today for evaluation for #2 Prolia.  Tolerated 1st injection; no side effects to report.  Remains in good health.  Active and well.  Compliant with calcium/vitamin D supplementation.  Requesting labs be faxed to his cardiologist, Dr. Thompson as he has an upcoming appt.  Denies fevers, chills, dysuria, congestion, cough, etc.      Oncology History   Elevated PSA   4/10/2017 Initial Diagnosis    Elevated PSA            Past Medical History:   Diagnosis Date    Charcot Mague Tooth muscular atrophy     His father had it. Pt started with dropped foot and poor pectoral muscles. able to walk well.     Colon polyp     Diabetes mellitus, type 2     Elevated PSA     Foot drop, bilateral     Gait abnormality     Gout     right ankle    H. pylori infection 2018    had EGD, undergoing treatment    HEARING LOSS     Hyperlipidemia     Hypertension     Kidney stone     Muscular dystrophy      "diagnosed neurology [facility] -wears leg braces prn    MVP (mitral valve prolapse)     LAQUITA (obstructive sleep apnea)     resolved with weight loss- no longer requires cpap    Tubular adenoma of colon     Urolithiasis        Past Surgical History:   Procedure Laterality Date    ANKLE FRACTURE SURGERY      CIRCUMCISION      COLONOSCOPY  ~ or   ([place]?  [place]?)    "Nothing"    COLONOSCOPY N/A 10/2/2020    Procedure: COLONOSCOPY;  Surgeon: Vernon Houser Jr., MD;  Location: [facility];  Service: Endoscopy;  Laterality: N/A;    COLONOSCOPY W/ POLYPECTOMY  04/15/2015    [facility].   Two 1 to 3 mm polyps in the proximal ascending colon.  TUBULAR ADENOMA.   Internal hemorrhoids.  Otherwise normalcolon and TI.    ESOPHAGOGASTRODUODENOSCOPY      EXTRACORPOREAL SHOCK WAVE LITHOTRIPSY  , 2004    x 2 - dr chivo joshi ([facility])    FACIAL LACERATIONS REPAIR      as a child    MULTIPLE TOOTH EXTRACTIONS      ORIF ankle fracture      PROSTATE BIOPSY      X2       Family History   Problem Relation Age of Onset    Diabetes Father     Hypertension Father     Muscular dystrophy Father     Prostate cancer Father         did no0t die of it    Lung cancer Father         prostate, did not die of/lung,  of    Glaucoma Father     Macular degeneration Father     Hypertension Mother     Glaucoma Mother     Macular degeneration Mother     Scoliosis Sister     Stroke Sister         massive - SUDDEN DEATH    Pulmonary embolism Paternal Grandfather     Prostate cancer Paternal Grandfather        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      Comment: [employer], [employer]    Occupation: avid bicycle rider, rides twice a day     Comment: 10 miles a day    Tobacco Use    Smoking status: Never    Smokeless tobacco: Never   Substance and Sexual Activity    Alcohol use: No    Drug use: No    Sexual activity: Never "     Social Determinants of Health     Financial Resource Strain: Low Risk     Difficulty of Paying Living Expenses: Not hard at all   Food Insecurity: No Food Insecurity    Worried About Running Out of Food in the Last Year: Never true    Ran Out of Food in the Last Year: Never true   Transportation Needs: No Transportation Needs    Lack of Transportation (Medical): No    Lack of Transportation (Non-Medical): No   Physical Activity: Insufficiently Active    Days of Exercise per Week: 4 days    Minutes of Exercise per Session: 20 min   Stress: No Stress Concern Present    Feeling of Stress : Only a little   Social Connections: Unknown    Frequency of Communication with Friends and Family: Twice a week    Frequency of Social Gatherings with Friends and Family: Three times a week    Active Member of Clubs or Organizations: No    Attends Club or Organization Meetings: Never    Marital Status:    Housing Stability: Low Risk     Unable to Pay for Housing in the Last Year: No    Number of Places Lived in the Last Year: 1    Unstable Housing in the Last Year: No       Review of patient's allergies indicates:   Allergen Reactions    Oxybutynin chloride Other (See Comments)     Severe reflux  Other reaction(s): Other (See Comments)  Severe reflux    Esomeprazole magnesium Rash       Review of Systems   Eyes:  Negative for visual disturbance.   Cardiovascular:  Negative for chest pain.   Respiratory:  Negative for shortness of breath.    Hematologic/Lymphatic: Negative for adenopathy.   Skin:  Negative for rash.   Musculoskeletal:  Negative for back pain.   Gastrointestinal:  Negative for abdominal pain and diarrhea.   Neurological:  Negative for headaches.   Psychiatric/Behavioral:  The patient is not nervous/anxious.    All other systems reviewed and are negative.         Objective:   Weight:  Gain of 5 1/2 pounds in 9 months  Vitals:    11/30/22 1017   BP: (!) 144/91   BP Location: Left arm   Patient Position:  "Sitting   BP Method: Large (Automatic)   Pulse: 68   Resp: 18   Temp: 98 °F (36.7 °C)   TempSrc: Temporal   SpO2: 95%   Weight: 98.4 kg (216 lb 14.9 oz)   Height: 5' 9" (1.753 m)        Physical Exam  Vitals reviewed.   Constitutional:       General: He is not in acute distress.  HENT:      Head: Normocephalic and atraumatic.      Mouth/Throat:      Pharynx: Oropharynx is clear.   Cardiovascular:      Rate and Rhythm: Normal rate and regular rhythm.      Pulses: Normal pulses.      Heart sounds: Normal heart sounds.   Pulmonary:      Effort: Pulmonary effort is normal.      Breath sounds: No wheezing.   Musculoskeletal:      Cervical back: Neck supple.      Comments: Braces to lower extremities   Lymphadenopathy:      Cervical: No cervical adenopathy.   Neurological:      Mental Status: He is alert and oriented to person, place, and time.   Psychiatric:         Behavior: Behavior normal.         Thought Content: Thought content normal.           Current Outpatient Medications on File Prior to Visit   Medication Sig    blood sugar diagnostic Strp by Misc.(Non-Drug; Combo Route) route. One touch Ultra Blue    calcium-vitamin D3 (OYSTER SHELL CALCIUM-VIT D3) 500 mg-5 mcg (200 unit) per tablet Take 1 tablet by mouth 2 (two) times daily.    carvediloL (COREG) 12.5 MG tablet Take 12.5 mg by mouth 2 (two) times daily.     coenzyme Q10 100 mg capsule Take 100 mg by mouth once daily.    FLAXSEED ORAL Take by mouth. Flaxseed Ground-2 tablespoons daily    glipiZIDE (GLUCOTROL) 2.5 MG TR24 Take 2.5 mg by mouth 2 (two) times a day.    hydrochlorothiazide (HYDRODIURIL) 25 MG tablet Take 25 mg by mouth once daily.    losartan (COZAAR) 100 MG tablet Take 1 tablet (100 mg total) by mouth once daily.    metFORMIN (GLUCOPHAGE) 1000 MG tablet TAKE 1 TABLET BY MOUTH TWICE A DAY    ONETOUCH DELICA LANCETS 30 gauge Misc     rosuvastatin (CRESTOR) 20 MG tablet Take 1 tablet (20 mg total) by mouth once daily.    tamsulosin (FLOMAX) 0.4 mg " Cap Take 1 capsule (0.4 mg total) by mouth once daily.     No current facility-administered medications on file prior to visit.       CBC:  Lab Results   Component Value Date    WBC 9.48 11/28/2022    HGB 13.9 (L) 11/28/2022    HCT 42.6 11/28/2022    MCV 86 11/28/2022     11/28/2022     CMP:  Sodium   Date Value Ref Range Status   11/28/2022 143 136 - 145 mmol/L Final     Potassium   Date Value Ref Range Status   11/28/2022 3.7 3.5 - 5.1 mmol/L Final     Chloride   Date Value Ref Range Status   11/28/2022 103 95 - 110 mmol/L Final     CO2   Date Value Ref Range Status   11/28/2022 29 23 - 29 mmol/L Final     Glucose   Date Value Ref Range Status   11/28/2022 159 (H) 70 - 110 mg/dL Final     BUN   Date Value Ref Range Status   11/28/2022 14 8 - 23 mg/dL Final     Creatinine   Date Value Ref Range Status   11/28/2022 0.7 0.5 - 1.4 mg/dL Final     Calcium   Date Value Ref Range Status   11/28/2022 9.7 8.7 - 10.5 mg/dL Final     Total Protein   Date Value Ref Range Status   11/28/2022 7.1 6.0 - 8.4 g/dL Final     Albumin   Date Value Ref Range Status   11/28/2022 3.9 3.5 - 5.2 g/dL Final     Total Bilirubin   Date Value Ref Range Status   11/28/2022 0.4 0.1 - 1.0 mg/dL Final     Comment:     For infants and newborns, interpretation of results should be based  on gestational age, weight and in agreement with clinical  observations.    Premature Infant recommended reference ranges:  Up to 24 hours.............<8.0 mg/dL  Up to 48 hours............<12.0 mg/dL  3-5 days..................<15.0 mg/dL  6-29 days.................<15.0 mg/dL       Alkaline Phosphatase   Date Value Ref Range Status   11/28/2022 70 55 - 135 U/L Final     AST   Date Value Ref Range Status   11/28/2022 21 10 - 40 U/L Final     ALT   Date Value Ref Range Status   11/28/2022 21 10 - 44 U/L Final     Anion Gap   Date Value Ref Range Status   11/28/2022 11 8 - 16 mmol/L Final     eGFR if    Date Value Ref Range Status   03/02/2022  >60 >60 mL/min/1.73 m^2 Final     eGFR if non    Date Value Ref Range Status   03/02/2022 >60 >60 mL/min/1.73 m^2 Final     Comment:     Calculation used to obtain the estimated glomerular filtration  rate (eGFR) is the CKD-EPI equation.        LDH:  216  PSA:    < 0.01  Hemoglobin A1C   Date Value Ref Range Status   11/28/2022 7.0 (H) 4.0 - 5.6 % Final     Comment:     ADA Screening Guidelines:  5.7-6.4%  Consistent with prediabetes  >or=6.5%  Consistent with diabetes    High levels of fetal hemoglobin interfere with the HbA1C  assay. Heterozygous hemoglobin variants (HbS, HgC, etc)do  not significantly interfere with this assay.   However, presence of multiple variants may affect accuracy.     07/08/2022 7.3 (H) 4.0 - 5.6 % Final     Comment:     ADA Screening Guidelines:  5.7-6.4%  Consistent with prediabetes  >or=6.5%  Consistent with diabetes    High levels of fetal hemoglobin interfere with the HbA1C  assay. Heterozygous hemoglobin variants (HbS, HgC, etc)do  not significantly interfere with this assay.   However, presence of multiple variants may affect accuracy.     04/05/2022 7.7 (H) 4.0 - 5.6 % Final     Comment:     ADA Screening Guidelines:  5.7-6.4%  Consistent with prediabetes  >or=6.5%  Consistent with diabetes    High levels of fetal hemoglobin interfere with the HbA1C  assay. Heterozygous hemoglobin variants (HbS, HgC, etc)do  not significantly interfere with this assay.   However, presence of multiple variants may affect accuracy.     10/14/2021 6.9 (H) 5.7 % Final   06/09/2020 7.5 % Final   06/09/2020 7.5 (H) 4.8 - 5.6 % Final     DXA Bone Density Spine And Hip  Narrative: EXAMINATION:  DEXA BONE DENSITY SPINE HIP    CLINICAL HISTORY:  Family history of osteoporosis    TECHNIQUE:  DXA scanning was performed over the left hip and lumbar spine.  Review of the images confirms satisfactory positioning and technique.    COMPARISON:  None    FINDINGS:  The L1 to L4 vertebral bone mineral  density is equal to 0.898 g/cm squared with a T score of -1.8.    The left femoral neck bone mineral density is equal to 0.779 g/cm squared with a T score of -1.1.    There is a 8.5% risk of a major osteoporotic fracture and a 1.1% risk of hip fracture in the next 10 years (FRAX).  Impression: T-score of -1.8 suggestive of osteopenia    Consider FDA approved medical therapies in postmenopausal women and men aged 50 years and older, based on the following:    *A hip or vertebral (clinical or morphometric) fracture  *T score less than or equal to -2.5 at the femoral neck or spine after appropriate evaluation to exclude secondary causes.  *Low bone mass -- also known as osteopenia (T score between -1.0 and -2.5 at the femoral neck or spine) and a 10 year probability of hip fracture greater than or equal to 3% or a 10 year probability of major osteoporosis-related fracture greater than or equal to 20% based on the [place]-adapted WHO algorithm.  *Clinicians judgment and/or patient preference may indicate treatment for people with 10 year fracture probabilities is above or below these levels.    Electronically signed by: Solis Riley MD  Date:    02/11/2022  Time:    15:04       All pertinent labs and imaging reviewed.    Assessment:       1. Prostate cancer    2. Androgen deprivation therapy    3. Osteopenia, unspecified location         Plan:     Prostate cancer    Androgen deprivation therapy    Osteopenia, unspecified location       Proceed with #2 Prolia today.  Continue calcium/Vitamin D supplementation.  Notify the clinic for any invasive dental work.  F/U with Dr. Sigala to establish in 6 months with CBC, CMP, VIT D prior  Labs faxed to Dr. Thompson:  [phone]     Route Chart for Scheduling    Med Onc Chart Routing      Follow up with physician 6 months. f/u & establish with Dr. Sigala with CBC, CMP, VIT D prior   Follow up with LEANN    Infusion scheduling note    Injection scheduling note Prolia today #2/3   Labs     Imaging    Pharmacy appointment    Other referrals          Therapy Plan Information  Medications  denosumab (PROLIA) injection 60 mg  60 mg, Subcutaneous, Every 26 weeks

## 2022-12-05 ENCOUNTER — HOSPITAL ENCOUNTER (OUTPATIENT)
Dept: RADIOLOGY | Facility: HOSPITAL | Age: 67
Discharge: HOME | End: 2022-12-05
Attending: NURSE PRACTITIONER
Payer: MEDICARE

## 2022-12-05 ENCOUNTER — TELEPHONE (OUTPATIENT)
Dept: FAMILY MEDICINE | Facility: CLINIC | Age: 67
End: 2022-12-05

## 2022-12-05 ENCOUNTER — OFFICE VISIT (OUTPATIENT)
Dept: FAMILY MEDICINE | Facility: CLINIC | Age: 67
End: 2022-12-05
Payer: MEDICARE

## 2022-12-05 VITALS
DIASTOLIC BLOOD PRESSURE: 84 MMHG | HEIGHT: 70 IN | HEART RATE: 69 BPM | WEIGHT: 217.25 LBS | BODY MASS INDEX: 31.1 KG/M2 | TEMPERATURE: 99 F | SYSTOLIC BLOOD PRESSURE: 144 MMHG

## 2022-12-05 DIAGNOSIS — R05.3 CHRONIC COUGH: Primary | ICD-10-CM

## 2022-12-05 DIAGNOSIS — R05.9 COUGH, UNSPECIFIED TYPE: ICD-10-CM

## 2022-12-05 DIAGNOSIS — J06.9 UPPER RESPIRATORY TRACT INFECTION, UNSPECIFIED TYPE: Primary | ICD-10-CM

## 2022-12-05 DIAGNOSIS — R05.9 COUGH: ICD-10-CM

## 2022-12-05 DIAGNOSIS — R93.89 ABNORMAL CXR: ICD-10-CM

## 2022-12-05 LAB
CTP QC/QA: YES
FLUAV AG NPH QL: NEGATIVE
FLUBV AG NPH QL: NEGATIVE
SARS-COV-2 RNA RESP QL NAA+PROBE: NOT DETECTED

## 2022-12-05 PROCEDURE — U0003 INFECTIOUS AGENT DETECTION BY NUCLEIC ACID (DNA OR RNA); SEVERE ACUTE RESPIRATORY SYNDROME CORONAVIRUS 2 (SARS-COV-2) (CORONAVIRUS DISEASE [COVID-19]), AMPLIFIED PROBE TECHNIQUE, MAKING USE OF HIGH THROUGHPUT TECHNOLOGIES AS DESCRIBED BY CMS-2020-01-R: HCPCS | Performed by: NURSE PRACTITIONER

## 2022-12-05 PROCEDURE — 71046 XR CHEST PA AND LATERAL: ICD-10-PCS | Mod: 26,,, | Performed by: RADIOLOGY

## 2022-12-05 PROCEDURE — 99215 OFFICE O/P EST HI 40 MIN: CPT | Mod: PBBFAC,25,PO | Performed by: NURSE PRACTITIONER

## 2022-12-05 PROCEDURE — 99213 PR OFFICE/OUTPT VISIT, EST, LEVL III, 20-29 MIN: ICD-10-PCS | Mod: S$PBB,,, | Performed by: NURSE PRACTITIONER

## 2022-12-05 PROCEDURE — 99999 PR PBB SHADOW E&M-EST. PATIENT-LVL V: ICD-10-PCS | Mod: PBBFAC,,, | Performed by: NURSE PRACTITIONER

## 2022-12-05 PROCEDURE — U0005 INFEC AGEN DETEC AMPLI PROBE: HCPCS | Performed by: NURSE PRACTITIONER

## 2022-12-05 PROCEDURE — 99999 PR PBB SHADOW E&M-EST. PATIENT-LVL V: CPT | Mod: PBBFAC,,, | Performed by: NURSE PRACTITIONER

## 2022-12-05 PROCEDURE — 87804 INFLUENZA ASSAY W/OPTIC: CPT | Mod: PBBFAC,PO | Performed by: NURSE PRACTITIONER

## 2022-12-05 PROCEDURE — 99213 OFFICE O/P EST LOW 20 MIN: CPT | Mod: S$PBB,,, | Performed by: NURSE PRACTITIONER

## 2022-12-05 PROCEDURE — 71046 X-RAY EXAM CHEST 2 VIEWS: CPT | Mod: 26,,, | Performed by: RADIOLOGY

## 2022-12-05 PROCEDURE — 71046 X-RAY EXAM CHEST 2 VIEWS: CPT | Mod: TC,PO

## 2022-12-05 RX ORDER — CEPHALEXIN 500 MG/1
500 CAPSULE ORAL EVERY 12 HOURS
Qty: 14 CAPSULE | Refills: 0 | Status: SHIPPED | OUTPATIENT
Start: 2022-12-05 | End: 2022-12-05

## 2022-12-05 RX ORDER — DOXYCYCLINE HYCLATE 100 MG
100 TABLET ORAL 2 TIMES DAILY
Qty: 20 TABLET | Refills: 0 | Status: SHIPPED | OUTPATIENT
Start: 2022-12-05 | End: 2022-12-08 | Stop reason: ALTCHOICE

## 2022-12-05 RX ORDER — ALBUTEROL SULFATE 90 UG/1
2 AEROSOL, METERED RESPIRATORY (INHALATION) EVERY 6 HOURS PRN
Qty: 18 G | Refills: 0 | Status: SHIPPED | OUTPATIENT
Start: 2022-12-05 | End: 2022-12-29

## 2022-12-05 RX ORDER — PROMETHAZINE HYDROCHLORIDE AND DEXTROMETHORPHAN HYDROBROMIDE 6.25; 15 MG/5ML; MG/5ML
5 SYRUP ORAL
COMMUNITY
End: 2023-03-15

## 2022-12-05 NOTE — TELEPHONE ENCOUNTER
----- Message from Kavita Perez NP sent at 12/5/2022  2:22 PM CST -----  Reviewed; elevated hemidiaphragm with right basilar atelectasis vs infiltrate; possible pneumonia development vs bronchitis. This was also seen on CXR done CXR 12/2019. Stop keflex; rx for doxycline sent. Continue albuterol as prescribed. Pulmonology also recommended for further evaluation. Report to ER immediately if symptoms worsen or persist.

## 2022-12-05 NOTE — PATIENT INSTRUCTIONS
Phenergan DM causes drowsiness  Monitor BG while taking cough med; stop if  or more  Albuterol can cause palpitations  Report to ER immediately if symptoms worsen or persist

## 2022-12-06 ENCOUNTER — PATIENT MESSAGE (OUTPATIENT)
Dept: PULMONOLOGY | Facility: CLINIC | Age: 67
End: 2022-12-06
Payer: MEDICARE

## 2022-12-06 NOTE — PROGRESS NOTES
Subjective:       Patient ID: Merrill Cummings is a 67 y.o. male.    Chief Complaint: Cough (2 weeks,)    Cough  This is a recurrent problem. The current episode started 1 to 4 weeks ago. The problem has been waxing and waning. The problem occurs hourly. The cough is Non-productive. Associated symptoms include nasal congestion and wheezing. Pertinent negatives include no chest pain, chills, ear congestion, ear pain, fever, headaches, heartburn, hemoptysis, myalgias, postnasal drip, rash, rhinorrhea, sore throat, shortness of breath, sweats or weight loss. The symptoms are aggravated by cold air. He has tried OTC cough suppressant for the symptoms. The treatment provided mild relief. There is no history of asthma, bronchiectasis, bronchitis, COPD, emphysema, environmental allergies or pneumonia.   Past Medical History:   Diagnosis Date    Charcot Mague Tooth muscular atrophy 2007    His father had it. Pt started with dropped foot and poor pectoral muscles. able to walk well.     Colon polyp     Diabetes mellitus, type 2     Elevated PSA     Foot drop, bilateral     Gait abnormality     Gout     right ankle    H. 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     Comment: [employer] [place], [employer]    Occupation: avid bicycle rider, rides twice a day     Comment: 10 miles a day    Tobacco Use    Smoking status: Never    Smokeless tobacco: Never   Substance and Sexual Activity    Alcohol use: No    Drug use: No    Sexual activity: Never  "    Social Determinants of Health     Financial Resource Strain: Low Risk     Difficulty of Paying Living Expenses: Not hard at all   Food Insecurity: No Food Insecurity    Worried About Running Out of Food in the Last Year: Never true    Ran Out of Food in the Last Year: Never true   Transportation Needs: No Transportation Needs    Lack of Transportation (Medical): No    Lack of Transportation (Non-Medical): No   Physical Activity: Insufficiently Active    Days of Exercise per Week: 3 days    Minutes of Exercise per Session: 40 min   Stress: No Stress Concern Present    Feeling of Stress : Not at all   Social Connections: Unknown    Frequency of Communication with Friends and Family: Three times a week    Frequency of Social Gatherings with Friends and Family: Three times a week    Active Member of Clubs or Organizations: No    Attends Club or Organization Meetings: Never    Marital Status:    Housing Stability: Low Risk     Unable to Pay for Housing in the Last Year: No    Number of Places Lived in the Last Year: 1    Unstable Housing in the Last Year: No     Past Surgical History:   Procedure Laterality Date    ANKLE FRACTURE SURGERY      CIRCUMCISION      COLONOSCOPY  ~2004 or 05  ([place]?  [place]?)    "Nothing"    COLONOSCOPY N/A 10/2/2020    Procedure: COLONOSCOPY;  Surgeon: Vernon Houser Jr., MD;  Location: [facility];  Service: Endoscopy;  Laterality: N/A;    COLONOSCOPY W/ POLYPECTOMY  04/15/2015    [facility].   Two 1 to 3 mm polyps in the proximal ascending colon.  TUBULAR ADENOMA.   Internal hemorrhoids.  Otherwise normalcolon and TI.    ESOPHAGOGASTRODUODENOSCOPY      EXTRACORPOREAL SHOCK WAVE LITHOTRIPSY  2002, 2004    x 2 - dr chivo joshi ([facility])    FACIAL LACERATIONS REPAIR      as a child    MULTIPLE TOOTH EXTRACTIONS      ORIF ankle fracture  1988    PROSTATE BIOPSY      X2       Review of Systems   Constitutional: Negative.  Negative for chills, fever and weight loss.   HENT: " Negative.  Negative for ear pain, postnasal drip, rhinorrhea and sore throat.    Eyes: Negative.    Respiratory:  Positive for cough and wheezing. Negative for hemoptysis and shortness of breath.    Cardiovascular: Negative.  Negative for chest pain.   Gastrointestinal: Negative.  Negative for heartburn.   Endocrine: Negative.    Genitourinary: Negative.    Musculoskeletal: Negative.  Negative for myalgias.   Integumentary:  Negative for rash. Negative.   Allergic/Immunologic: Negative.  Negative for environmental allergies.   Neurological: Negative.  Negative for headaches.   Psychiatric/Behavioral: Negative.         Objective:      Physical Exam  Vitals and nursing note reviewed.   Constitutional:       Appearance: Normal appearance.   HENT:      Head: Normocephalic.      Right Ear: Hearing, tympanic membrane, ear canal and external ear normal.      Left Ear: Hearing, tympanic membrane, ear canal and external ear normal.      Nose: Nose normal.      Mouth/Throat:      Mouth: Mucous membranes are moist.      Pharynx: Oropharynx is clear.   Eyes:      Conjunctiva/sclera: Conjunctivae normal.      Pupils: Pupils are equal, round, and reactive to light.   Cardiovascular:      Rate and Rhythm: Normal rate and regular rhythm.      Pulses: Normal pulses.      Heart sounds: Normal heart sounds.   Pulmonary:      Effort: Pulmonary effort is normal.      Breath sounds: Normal breath sounds.   Abdominal:      General: Bowel sounds are normal.      Palpations: Abdomen is soft.   Musculoskeletal:         General: Normal range of motion.      Cervical back: Normal range of motion and neck supple.   Skin:     General: Skin is warm and dry.      Capillary Refill: Capillary refill takes 2 to 3 seconds.   Neurological:      Mental Status: He is alert and oriented to person, place, and time.   Psychiatric:         Mood and Affect: Mood normal.         Behavior: Behavior normal.         Thought Content: Thought content normal.          Judgment: Judgment normal.       Assessment:       Problem List Items Addressed This Visit    None  Visit Diagnoses       Upper respiratory tract infection, unspecified type    -  Primary    Cough, unspecified type        Relevant Orders    X-Ray Chest PA And Lateral (Completed)    POCT Influenza A/B (Completed)    Cough        Relevant Orders    X-Ray Chest PA And Lateral (Completed)    POCT Influenza A/B (Completed)              Plan:           Merrill was seen today for cough.    Diagnoses and all orders for this visit:    Upper respiratory tract infection, unspecified type  Cough, unspecified type  Cough  -     X-Ray Chest PA And Lateral; Future  -     POCT Influenza A/B        -     albuterol (PROVENTIL/VENTOLIN HFA) 90 mcg/actuation inhaler; Inhale 2 puffs into the             lungs every 6 (six) hours as needed for Wheezing or Shortness of Breath.  -     doxycycline (VIBRA-TABS) 100 MG tablet; Take 1 tablet (100 mg total) by mouth 2 (two) times daily. for 10 days  -     COVID-19 Routine Screening       Report to ER immediately if symptoms worsen or persist

## 2022-12-08 ENCOUNTER — OFFICE VISIT (OUTPATIENT)
Dept: FAMILY MEDICINE | Facility: CLINIC | Age: 67
End: 2022-12-08
Payer: MEDICARE

## 2022-12-08 VITALS
WEIGHT: 211.88 LBS | TEMPERATURE: 97 F | HEART RATE: 85 BPM | BODY MASS INDEX: 30.33 KG/M2 | RESPIRATION RATE: 18 BRPM | SYSTOLIC BLOOD PRESSURE: 129 MMHG | OXYGEN SATURATION: 96 % | HEIGHT: 70 IN | DIASTOLIC BLOOD PRESSURE: 84 MMHG

## 2022-12-08 DIAGNOSIS — M21.372 BILATERAL FOOT-DROP: ICD-10-CM

## 2022-12-08 DIAGNOSIS — G70.9: ICD-10-CM

## 2022-12-08 DIAGNOSIS — C61 PROSTATE CANCER: ICD-10-CM

## 2022-12-08 DIAGNOSIS — E78.5 HYPERLIPIDEMIA ASSOCIATED WITH TYPE 2 DIABETES MELLITUS: ICD-10-CM

## 2022-12-08 DIAGNOSIS — I15.2 HYPERTENSION ASSOCIATED WITH DIABETES: ICD-10-CM

## 2022-12-08 DIAGNOSIS — M21.371 BILATERAL FOOT-DROP: ICD-10-CM

## 2022-12-08 DIAGNOSIS — J98.6 CHRONICALLY ELEVATED HEMIDIAPHRAGM: ICD-10-CM

## 2022-12-08 DIAGNOSIS — I70.0 CALCIFICATION OF AORTA: ICD-10-CM

## 2022-12-08 DIAGNOSIS — K82.4 GALLBLADDER POLYP: ICD-10-CM

## 2022-12-08 DIAGNOSIS — Z79.899 ENCOUNTER FOR LONG-TERM (CURRENT) USE OF MEDICATIONS: ICD-10-CM

## 2022-12-08 DIAGNOSIS — E11.69 HYPERLIPIDEMIA ASSOCIATED WITH TYPE 2 DIABETES MELLITUS: ICD-10-CM

## 2022-12-08 DIAGNOSIS — Z79.818 ANDROGEN DEPRIVATION THERAPY: ICD-10-CM

## 2022-12-08 DIAGNOSIS — E11.9 TYPE 2 DIABETES MELLITUS WITHOUT COMPLICATION, WITHOUT LONG-TERM CURRENT USE OF INSULIN: Primary | ICD-10-CM

## 2022-12-08 DIAGNOSIS — E11.59 HYPERTENSION ASSOCIATED WITH DIABETES: ICD-10-CM

## 2022-12-08 PROCEDURE — 99214 OFFICE O/P EST MOD 30 MIN: CPT | Mod: S$PBB,,, | Performed by: FAMILY MEDICINE

## 2022-12-08 PROCEDURE — 99214 PR OFFICE/OUTPT VISIT, EST, LEVL IV, 30-39 MIN: ICD-10-PCS | Mod: S$PBB,,, | Performed by: FAMILY MEDICINE

## 2022-12-08 PROCEDURE — 99999 PR PBB SHADOW E&M-EST. PATIENT-LVL V: CPT | Mod: PBBFAC,,, | Performed by: FAMILY MEDICINE

## 2022-12-08 PROCEDURE — 99999 PR PBB SHADOW E&M-EST. PATIENT-LVL V: ICD-10-PCS | Mod: PBBFAC,,, | Performed by: FAMILY MEDICINE

## 2022-12-08 PROCEDURE — 99215 OFFICE O/P EST HI 40 MIN: CPT | Mod: PBBFAC,PO | Performed by: FAMILY MEDICINE

## 2022-12-08 RX ORDER — CEPHALEXIN 500 MG/1
500 CAPSULE ORAL EVERY 12 HOURS
COMMUNITY
Start: 2022-12-05 | End: 2022-12-08

## 2022-12-08 RX ORDER — LEUPROLIDE ACETATE 45 MG
45 KIT INTRAMUSCULAR
COMMUNITY
End: 2023-06-05

## 2022-12-09 NOTE — PROGRESS NOTES
Follow-up diabetes.  Blood pressure controlled.  Lipids on statin..  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  Uses ankle-foot orthotic .  He was diagnosed with prostate cancer this year treated with radiation.  Previous gallbladder polyp past due for follow-up imaging.  Aortic calcification asymptomatic.    Merrill was seen today for annual exam.    Diagnoses and all orders for this visit:    Type 2 diabetes mellitus without complication, without long-term current use of insulin  -     Hemoglobin A1C; Future  -     Vitamin B12; Future    Chronically elevated hemidiaphragm    Prostate cancer    Androgen deprivation therapy    Calcification of aorta    Neuromyopathy syndrome    Bilateral foot-drop    Gallbladder polyp  -     US Abdomen Limited; Future    Hyperlipidemia associated with type 2 diabetes mellitus    Hypertension associated with diabetes    Encounter for long-term (current) use of medications  -     Vitamin B12; Future    Reviewed recent laboratory stable conditions controlled.  Continue current medications.  Follow-up laboratory scheduled for 6 months.  Continue follow-up with Neurology and Urology.  States pending eye exam.              Diabetes Management Status    Statin: Taking  ACE/ARB: Taking    Screening or Prevention Patient's value Goal Complete/Controlled?   HgA1C Testing and Control   Lab Results   Component Value Date    HGBA1C 7.0 (H) 11/28/2022      Annually/Less than 8% Yes     Lipid profile : 11/28/2022 Annually Yes     LDL control Lab Results   Component Value Date    LDLCALC 58.6 (L) 11/28/2022    Annually/Less than 100 mg/dl  Yes     Nephropathy screening Lab Results   Component Value Date    LABMICR 17.0 11/28/2022     No results found for: PROTEINUA  No results found for: UTPCR   Annually Yes     Blood pressure BP Readings from Last 1  "Encounters:   12/08/22 129/84    Less than 140/90 Yes     Dilated retinal exam : 08/07/2020 Annually No     Foot exam   : 12/08/2022 Annually Yes             Past Medical History:  Past Medical History:   Diagnosis Date    Charcot Mague Tooth muscular atrophy 2007    His father had it. Pt started with dropped foot and poor pectoral muscles. able to walk well.     Colon polyp     Diabetes mellitus, type 2     Elevated PSA     Foot drop, bilateral     Gait abnormality     Gallbladder polyp 1/5/2015    Gout     right ankle    H. pylori infection 02/01/2018    had EGD, undergoing treatment    HEARING LOSS     Hyperlipidemia     Hypertension     Kidney stone     Muscular dystrophy 2012    diagnosed neurology [facility] -wears leg braces prn    MVP (mitral valve prolapse)     LAQUITA (obstructive sleep apnea)     resolved with weight loss- no longer requires cpap    Prostate cancer 2/28/2022    Tubular adenoma of colon     Urolithiasis      Past Surgical History:   Procedure Laterality Date    ANKLE FRACTURE SURGERY      CIRCUMCISION      COLONOSCOPY  ~2004 or 05  ([place]?  [place]    "Nothing"    COLONOSCOPY N/A 10/2/2020    Procedure: COLONOSCOPY;  Surgeon: Vernon Houser Jr., MD;  Location: [facility];  Service: Endoscopy;  Laterality: N/A;    COLONOSCOPY W/ POLYPECTOMY  04/15/2015    SARAH.   Two 1 to 3 mm polyps in the proximal ascending colon.  TUBULAR ADENOMA.   Internal hemorrhoids.  Otherwise normalcolon and TI.    ESOPHAGOGASTRODUODENOSCOPY      EXTRACORPOREAL SHOCK WAVE LITHOTRIPSY  2002, 2004    x 2 - dr chivo joshi ([facility])    FACIAL LACERATIONS REPAIR      as a child    MULTIPLE TOOTH EXTRACTIONS      ORIF ankle fracture  1988    PROSTATE BIOPSY      X2     Review of patient's allergies indicates:   Allergen Reactions    Oxybutynin chloride Other (See Comments)     Severe reflux  Other reaction(s): Other (See Comments)  Severe reflux    Esomeprazole magnesium Rash     Current Outpatient Medications on File " Prior to Visit   Medication Sig Dispense Refill    albuterol (PROVENTIL/VENTOLIN HFA) 90 mcg/actuation inhaler Inhale 2 puffs into the lungs every 6 (six) hours as needed for Wheezing or Shortness of Breath. 18 g 0    blood sugar diagnostic Strp by Misc.(Non-Drug; Combo Route) route. One touch Ultra Blue      calcium-vitamin D3 (OYSTER SHELL CALCIUM-VIT D3) 500 mg-5 mcg (200 unit) per tablet Take 1 tablet by mouth 2 (two) times daily. 180 tablet 3    carvediloL (COREG) 12.5 MG tablet Take 12.5 mg by mouth 2 (two) times daily.       coenzyme Q10 100 mg capsule Take 100 mg by mouth once daily.      FLAXSEED ORAL Take by mouth. Flaxseed Ground-2 tablespoons daily      glipiZIDE (GLUCOTROL) 2.5 MG TR24 Take 2.5 mg by mouth 2 (two) times a day.      hydrochlorothiazide (HYDRODIURIL) 25 MG tablet Take 25 mg by mouth once daily.  1    losartan (COZAAR) 100 MG tablet Take 1 tablet (100 mg total) by mouth once daily. 90 tablet 0    metFORMIN (GLUCOPHAGE) 1000 MG tablet TAKE 1 TABLET BY MOUTH TWICE A  tablet 0    ONETOUCH DELICA LANCETS 30 gauge Misc       rosuvastatin (CRESTOR) 20 MG tablet Take 1 tablet (20 mg total) by mouth once daily. 90 tablet 0    tamsulosin (FLOMAX) 0.4 mg Cap Take 1 capsule (0.4 mg total) by mouth once daily. 30 capsule 11    [DISCONTINUED] cephALEXin (KEFLEX) 500 MG capsule Take 500 mg by mouth every 12 (twelve) hours.      [DISCONTINUED] doxycycline (VIBRA-TABS) 100 MG tablet Take 1 tablet (100 mg total) by mouth 2 (two) times daily. for 10 days 20 tablet 0    leuprolide acetate, 6 month, (LUPRON DEPOT, 6 MONTH,) 45 mg SyKt injection Inject 45 mg into the muscle every 6 (six) months.      promethazine-dextromethorphan (PROMETHAZINE-DM) 6.25-15 mg/5 mL Syrp Take 5 mLs by mouth.       No current facility-administered medications on file prior to visit.     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       Comment: [employer], [employer]    Occupation: avid bicycle rider, rides twice a day     Comment: 10 miles a day    Tobacco Use    Smoking status: Never    Smokeless tobacco: Never   Substance and Sexual Activity    Alcohol use: No    Drug use: No    Sexual activity: Never     Social Determinants of Health     Financial Resource Strain: Low Risk     Difficulty of Paying Living Expenses: Not hard at all   Food Insecurity: No Food Insecurity    Worried About Running Out of Food in the Last Year: Never true    Ran Out of Food in the Last Year: Never true   Transportation Needs: No Transportation Needs    Lack of Transportation (Medical): No    Lack of Transportation (Non-Medical): No   Physical Activity: Insufficiently Active    Days of Exercise per Week: 3 days    Minutes of Exercise per Session: 40 min   Stress: No Stress Concern Present    Feeling of Stress : Not at all   Social Connections: Unknown    Frequency of Communication with Friends and Family: Three times a week    Frequency of Social Gatherings with Friends and Family: Three times a week    Active Member of Clubs or Organizations: No    Attends Club or Organization Meetings: Never    Marital Status:    Housing Stability: Low Risk     Unable to Pay for Housing in the Last Year: No    Number of Places Lived in the Last Year: 1    Unstable Housing in the Last Year: No     Family History   Problem Relation Age of Onset    Diabetes Father     Hypertension Father     Muscular dystrophy Father     Prostate cancer Father         did no0t die of it    Lung cancer Father         prostate, did not die of/lung,  of    Glaucoma Father     Macular degeneration Father     Hypertension Mother     Glaucoma Mother     Macular degeneration Mother     Scoliosis Sister     Stroke Sister         massive - SUDDEN DEATH    Pulmonary embolism Paternal Grandfather     Prostate cancer Paternal Grandfather            ROS:  GENERAL: No fever,  "chills,  or significant weight changes.   CARDIOVASCULAR: Denies chest pain, PND, orthopnea or reduced exercise tolerance.  ABDOMEN: Appetite fine. Denies diarrhea, abdominal pain, hematemesis or blood in stool.  URINARY: No flank pain, dysuria or hematuria.      OBJECTIVE:   Vitals:    12/08/22 0807   BP: 129/84   Pulse: 85   Resp: 18   Temp: 97 °F (36.1 °C)   SpO2: 96%   Weight: 96.1 kg (211 lb 13.8 oz)   Height: 5' 10" (1.778 m)     Wt Readings from Last 3 Encounters:   12/08/22 96.1 kg (211 lb 13.8 oz)   12/05/22 98.5 kg (217 lb 4.2 oz)   11/30/22 98.4 kg (216 lb 14.9 oz)       APPEARANCE: Well nourished, well developed, in no acute distress.   HEAD: Normocephalic. Atraumatic. No sinus tenderness.   EYES:   Right eye: Pupil reactive. Conjunctiva clear.   Left eye: Pupil reactive. Conjunctiva clear.   Both fundi: Grossly normal to nondilated exam. EOMI.   EARS: TM's intact. Light reflex normal. No retraction or perforation.   NOSE: clear.   MOUTH & THROAT: No pharyngeal erythema or exudate. No lesions.   NECK: No bruits. No JVD. No cervical lymphadenopathy. No thyromegaly.   CHEST: Breath sounds clear bilaterally. Normal respiratory effort   CARDIOVASCULAR: Normal rate. Regular rhythm. No murmurs. No rub. No gallops.   ABDOMEN: Bowel sounds normal. Soft. No tenderness. No organomegaly.   PERIPHERAL VASCULAR: No cyanosis. No clubbing. No edema.   NEUROLOGIC:  Cranial nerves intact.  Bilateral foot drop  Feet:Sensation in the feet intact to monofilament testing except some decrease at the heels more so on the right.  Some hammertoes  No significant foot lesions.Pulses palpable.  MENTAL STATUS: Alert. Oriented x 3.             "

## 2022-12-12 ENCOUNTER — PATIENT MESSAGE (OUTPATIENT)
Dept: FAMILY MEDICINE | Facility: CLINIC | Age: 67
End: 2022-12-12
Payer: MEDICARE

## 2022-12-15 ENCOUNTER — PATIENT MESSAGE (OUTPATIENT)
Dept: FAMILY MEDICINE | Facility: CLINIC | Age: 67
End: 2022-12-15
Payer: MEDICARE

## 2022-12-16 ENCOUNTER — PATIENT MESSAGE (OUTPATIENT)
Dept: FAMILY MEDICINE | Facility: CLINIC | Age: 67
End: 2022-12-16
Payer: MEDICARE

## 2022-12-20 ENCOUNTER — PATIENT MESSAGE (OUTPATIENT)
Dept: FAMILY MEDICINE | Facility: CLINIC | Age: 67
End: 2022-12-20
Payer: MEDICARE

## 2022-12-20 ENCOUNTER — HOSPITAL ENCOUNTER (OUTPATIENT)
Dept: RADIOLOGY | Facility: HOSPITAL | Age: 67
Discharge: HOME | End: 2022-12-20
Attending: FAMILY MEDICINE
Payer: MEDICARE

## 2022-12-20 DIAGNOSIS — K82.4 GALLBLADDER POLYP: ICD-10-CM

## 2022-12-20 PROBLEM — K80.20 CALCULUS OF GALLBLADDER WITHOUT CHOLECYSTITIS WITHOUT OBSTRUCTION: Status: ACTIVE | Noted: 2022-12-20

## 2022-12-20 PROCEDURE — 76705 US ABDOMEN LIMITED: ICD-10-PCS | Mod: 26,,, | Performed by: RADIOLOGY

## 2022-12-20 PROCEDURE — 76705 ECHO EXAM OF ABDOMEN: CPT | Mod: 26,,, | Performed by: RADIOLOGY

## 2022-12-20 PROCEDURE — 76705 ECHO EXAM OF ABDOMEN: CPT | Mod: TC,PO

## 2022-12-26 ENCOUNTER — PATIENT MESSAGE (OUTPATIENT)
Dept: FAMILY MEDICINE | Facility: CLINIC | Age: 67
End: 2022-12-26
Payer: MEDICARE

## 2022-12-26 DIAGNOSIS — E11.9 TYPE 2 DIABETES MELLITUS WITHOUT COMPLICATION, WITHOUT LONG-TERM CURRENT USE OF INSULIN: Primary | ICD-10-CM

## 2022-12-27 RX ORDER — LANCETS
EACH MISCELLANEOUS
Qty: 100 EACH | Refills: 3 | Status: SHIPPED | OUTPATIENT
Start: 2022-12-27 | End: 2023-10-02

## 2022-12-27 NOTE — TELEPHONE ENCOUNTER
No new care gaps identified.  [facility] Embedded Care Gaps. Reference number: [id number]. 12/27/2022   10:33:49 AM CST

## 2023-01-11 ENCOUNTER — PATIENT MESSAGE (OUTPATIENT)
Dept: FAMILY MEDICINE | Facility: CLINIC | Age: 68
End: 2023-01-11
Payer: MEDICARE

## 2023-01-12 ENCOUNTER — PATIENT OUTREACH (OUTPATIENT)
Dept: ADMINISTRATIVE | Facility: HOSPITAL | Age: 68
End: 2023-01-12
Payer: MEDICARE

## 2023-01-18 ENCOUNTER — PATIENT MESSAGE (OUTPATIENT)
Dept: FAMILY MEDICINE | Facility: CLINIC | Age: 68
End: 2023-01-18
Payer: MEDICARE

## 2023-01-19 NOTE — PROGRESS NOTES
Received duplicate 2022 eye exam. Called patient to advised he will need to complete a DM Eye Exam. Patient stated last DM Eye Exam was in 2021, he will call facility to have them to fax that record to our office. Patient has f/u appt next month for eye exam. He will ask that complete DM Eye Exam on same day and fax record to PCP office.

## 2023-01-20 ENCOUNTER — PATIENT MESSAGE (OUTPATIENT)
Dept: FAMILY MEDICINE | Facility: CLINIC | Age: 68
End: 2023-01-20
Payer: MEDICARE

## 2023-01-25 ENCOUNTER — TELEPHONE (OUTPATIENT)
Dept: HEMATOLOGY/ONCOLOGY | Facility: CLINIC | Age: 68
End: 2023-01-25
Payer: MEDICARE

## 2023-02-09 ENCOUNTER — PATIENT MESSAGE (OUTPATIENT)
Dept: UROLOGY | Facility: CLINIC | Age: 68
End: 2023-02-09
Payer: MEDICARE

## 2023-02-27 LAB
LEFT EYE DM RETINOPATHY: NEGATIVE
RIGHT EYE DM RETINOPATHY: NEGATIVE

## 2023-03-10 DIAGNOSIS — C61 PROSTATE CANCER: ICD-10-CM

## 2023-03-10 DIAGNOSIS — Z79.818 ANDROGEN DEPRIVATION THERAPY: ICD-10-CM

## 2023-03-10 RX ORDER — FERROUS SULFATE, DRIED 160(50) MG
1 TABLET, EXTENDED RELEASE ORAL 2 TIMES DAILY
Qty: 180 TABLET | Refills: 3 | Status: SHIPPED | OUTPATIENT
Start: 2023-03-10 | End: 2024-01-19

## 2023-03-10 NOTE — TELEPHONE ENCOUNTER
----- Message from Asim Whitehead sent at 3/10/2023 10:51 AM CST -----  Refill:   Name of Caller: Merrill Arredondo Call Back Number:  [phone]  Additional Information:  Patient calling to verify that his prscription was sent in to  Prescription Name: calcium-vitamin D3 (OYSTER SHELL CALCIUM-VIT D3) 500 mg-5 mcg (200 unit) per tablet ()  Patient asked if you can email him the prescription [email or web address] so he can take it to the pharmacy  Pharmacy Name:   [id number] [place] [address]   Phone:  [phone]  Fax:  [phone]

## 2023-03-15 ENCOUNTER — OFFICE VISIT (OUTPATIENT)
Dept: UROLOGY | Facility: CLINIC | Age: 68
End: 2023-03-15
Payer: MEDICARE

## 2023-03-15 VITALS — HEIGHT: 70 IN | BODY MASS INDEX: 30.4 KG/M2

## 2023-03-15 DIAGNOSIS — C61 PROSTATE CANCER: Primary | ICD-10-CM

## 2023-03-15 PROCEDURE — 99213 OFFICE O/P EST LOW 20 MIN: CPT | Mod: S$PBB,,, | Performed by: UROLOGY

## 2023-03-15 PROCEDURE — 99999 PR PBB SHADOW E&M-EST. PATIENT-LVL III: ICD-10-PCS | Mod: PBBFAC,,, | Performed by: UROLOGY

## 2023-03-15 PROCEDURE — 99213 OFFICE O/P EST LOW 20 MIN: CPT | Mod: PBBFAC,PO | Performed by: UROLOGY

## 2023-03-15 PROCEDURE — 99999 PR PBB SHADOW E&M-EST. PATIENT-LVL III: CPT | Mod: PBBFAC,,, | Performed by: UROLOGY

## 2023-03-15 PROCEDURE — 99213 PR OFFICE/OUTPT VISIT, EST, LEVL III, 20-29 MIN: ICD-10-PCS | Mod: S$PBB,,, | Performed by: UROLOGY

## 2023-03-15 NOTE — PROGRESS NOTES
Subjective:       Patient ID: Merrill Cummings is a 68 y.o. male.    Chief Complaint: Follow-up    HPI    68-year-old with prostate cancer diagnosed in December 2021.  Group clinical stage IIIA. cT1c cN0 cM0, Sidney score 4+3=7.  PSA = 21.   Patient's father and grandfather  had prostate cancer.   He was treated by Dr. Puckett with a full course of radiation therapy and completed in June of 2022.  He is overall doing well.  He has no bothersome urinary symptoms.  Plan was 18 months of androgen deprivation.  He received his 1st Lupron injection in February of 2022.  He has hot flashes but is otherwise doing well.  Urine dipstick shows negative for all components.    Review of Systems   Constitutional:  Negative for fever.   Genitourinary:  Negative for dysuria and hematuria.     Objective:      Physical Exam  Vitals reviewed.   Constitutional:       Appearance: He is well-developed.   Pulmonary:      Effort: Pulmonary effort is normal.   Skin:     Findings: No rash.   Neurological:      Mental Status: He is alert and oriented to person, place, and time.       Assessment:       1. Prostate cancer        Plan:       Prostate cancer    Other orders  -     leuprolide acetate (6 month) injection 45 mg      Follow-up 6 months with PSA    Therapy plan entered for his final Lupron injection at the cancer center.

## 2023-03-20 ENCOUNTER — INFUSION (OUTPATIENT)
Dept: INFUSION THERAPY | Facility: HOSPITAL | Age: 68
End: 2023-03-20
Attending: INTERNAL MEDICINE
Payer: MEDICARE

## 2023-03-20 VITALS
HEART RATE: 60 BPM | OXYGEN SATURATION: 98 % | DIASTOLIC BLOOD PRESSURE: 90 MMHG | SYSTOLIC BLOOD PRESSURE: 152 MMHG | TEMPERATURE: 97 F | RESPIRATION RATE: 20 BRPM

## 2023-03-20 DIAGNOSIS — C61 PROSTATE CANCER: Primary | ICD-10-CM

## 2023-03-20 PROCEDURE — 63600175 PHARM REV CODE 636 W HCPCS: Mod: JZ,JG,PN | Performed by: UROLOGY

## 2023-03-20 PROCEDURE — 96402 CHEMO HORMON ANTINEOPL SQ/IM: CPT | Mod: PN

## 2023-03-20 RX ADMIN — LEUPROLIDE ACETATE 45 MG: KIT at 10:03

## 2023-03-20 NOTE — PLAN OF CARE
Problem: Adult Inpatient Plan of Care  Goal: Plan of Care Review  Outcome: Ongoing, Progressing  Goal: Patient-Specific Goal (Individualized)  Outcome: Ongoing, Progressing     Problem: Fatigue  Goal: Improved Activity Tolerance  Outcome: Ongoing, Progressing   .Patient tolerated lupron injection well, d/c in no acute distress.

## 2023-04-07 ENCOUNTER — PATIENT MESSAGE (OUTPATIENT)
Dept: FAMILY MEDICINE | Facility: CLINIC | Age: 68
End: 2023-04-07
Payer: MEDICARE

## 2023-04-19 ENCOUNTER — PATIENT MESSAGE (OUTPATIENT)
Dept: ADMINISTRATIVE | Facility: HOSPITAL | Age: 68
End: 2023-04-19
Payer: MEDICARE

## 2023-04-21 ENCOUNTER — PATIENT MESSAGE (OUTPATIENT)
Dept: ADMINISTRATIVE | Facility: HOSPITAL | Age: 68
End: 2023-04-21
Payer: MEDICARE

## 2023-04-21 NOTE — LETTER
Jesus Brennan MD    AUTHORIZATION FOR RELEASE OF   CONFIDENTIAL INFORMATION      We are seeing Merrill Cummings, date of birth 1955, in the clinic at [facility]. Go Raza MD is the patient's PCP. Merrill Cummings has an outstanding lab/procedure at the time we reviewed his chart. In order to help keep his health information updated, he has authorized us to request the following medical record(s):        (  )  MAMMOGRAM                                      (  )  COLONOSCOPY      (  )  PAP SMEAR                                          (  )  OUTSIDE LAB RESULTS     (  )  DEXA SCAN                                          (  x)  EYE EXAM            (  )  FOOT EXAM                                          (  )  ENTIRE RECORD     (  )  OUTSIDE IMMUNIZATIONS                 (  )  _______________         Please fax records to [facility], Gerald J Sparks, MD, [phone]     If you have any questions, please contact Linda DELGADO at [phone].           Patient Name: Merrill Cummings  : 1955  Patient Phone #: [phone]

## 2023-05-01 ENCOUNTER — DOCUMENTATION ONLY (OUTPATIENT)
Dept: ADMINISTRATIVE | Facility: OTHER | Age: 68
End: 2023-05-01
Payer: MEDICARE

## 2023-05-02 NOTE — PROGRESS NOTES
2023      Anil Barr M.D.   [address] [place] [address]      RE: Merrill Cummings    MR#:  [id number]    :  1955       DX:   1.  High-risk adenocarcinoma of the prostate diagnosed in 2021.  Group clinical   stage IIIA.  cT1c cN0 cM0.  [place] score 4+3=7.  PSA =21.  The patient's father and grandfather  had prostate cancer.       2.      Patient was thought to have Charcot Mague Tooth muscular dystrophy, although work up in  puts that diagnosis in doubt.        Dear Herber:     Your patient returns 11 months after completing definitive pelvic irradiation delivered with 1.5 years of androgen deprivation therapy.  On March 15, the patient received his third and final six-month Lupron injection.  The patient's latest PSA was in November and the PSA was less than 0.01.     The patient is doing well.  He has fairly good bowel and bladder function.  According to the patient, he was tested and previous to what he had been told, the tests show that he does not have Charcot-Mague-Tooth disease.  He has some type of neurologic disease that is not easily definable.  He gets around with a brace that helps prevent getting tripped up related to his known footdrop.     Physical exam shows a man in no distress.  He still has a good performance status and ambulates well on his own.  Digital rectal examination is not performed since he has not had previously palpable disease and his PSA is undetectable.     Symptomatically, the patient is doing well and his PSA is undetectable.  In , the patient will have a PSA and according to the patient he will be getting PSA's every six months.  On , the patient will see you in follow up.  I have asked see the patient back in 1.5 years or sooner if the need should arise.     25 to 30 minutes was spent on this follow up, at least 20 minutes was spent directly with the patient and his wife.  The patient had numerous questions that were  answered to the patient's and wife's satisfaction.     I appreciate the opportunity to consult on your patients.  Please call me with any questions or comments.     Sincerely,          Electronically Signed By:  Genaro Collins M.D.                                          GH/MedQ  DD:  05/01/2023  DT:  05/01/2023  Job #:  [id number]    CC:  Genaro Snow M.D., [address] [facility] [address], [place] [address], Fax: [phone]        Go Raza M.D., [address] [facility] [place] [address], Fax: [phone]        Dustin Thompson MD, [address] [facility] [place] [address], Fax: [phone]

## 2023-05-29 ENCOUNTER — PATIENT OUTREACH (OUTPATIENT)
Dept: ADMINISTRATIVE | Facility: HOSPITAL | Age: 68
End: 2023-05-29
Payer: MEDICARE

## 2023-05-29 NOTE — PROGRESS NOTES
Working Diabetes Lab Report.    Pt has lab appt scheduled, 5/30/23.  All needed labs scheduled.

## 2023-06-02 ENCOUNTER — LAB VISIT (OUTPATIENT)
Dept: LAB | Facility: HOSPITAL | Age: 68
End: 2023-06-02
Attending: FAMILY MEDICINE
Payer: MEDICARE

## 2023-06-02 DIAGNOSIS — Z79.899 ENCOUNTER FOR LONG-TERM (CURRENT) USE OF MEDICATIONS: ICD-10-CM

## 2023-06-02 DIAGNOSIS — E11.9 TYPE 2 DIABETES MELLITUS WITHOUT COMPLICATION, WITHOUT LONG-TERM CURRENT USE OF INSULIN: ICD-10-CM

## 2023-06-02 DIAGNOSIS — C61 PROSTATE CANCER: ICD-10-CM

## 2023-06-02 DIAGNOSIS — M85.80 OSTEOPENIA, UNSPECIFIED LOCATION: ICD-10-CM

## 2023-06-02 LAB
25(OH)D3+25(OH)D2 SERPL-MCNC: 30 NG/ML (ref 30–96)
ALBUMIN SERPL BCP-MCNC: 3.9 G/DL (ref 3.5–5.2)
ALP SERPL-CCNC: 50 U/L (ref 55–135)
ALT SERPL W/O P-5'-P-CCNC: 10 U/L (ref 10–44)
ANION GAP SERPL CALC-SCNC: 9 MMOL/L (ref 8–16)
AST SERPL-CCNC: 16 U/L (ref 10–40)
BASOPHILS # BLD AUTO: 0.05 K/UL (ref 0–0.2)
BASOPHILS NFR BLD: 1.2 % (ref 0–1.9)
BILIRUB SERPL-MCNC: 0.6 MG/DL (ref 0.1–1)
BUN SERPL-MCNC: 17 MG/DL (ref 8–23)
CALCIUM SERPL-MCNC: 9.7 MG/DL (ref 8.7–10.5)
CHLORIDE SERPL-SCNC: 102 MMOL/L (ref 95–110)
CO2 SERPL-SCNC: 26 MMOL/L (ref 23–29)
CREAT SERPL-MCNC: 0.7 MG/DL (ref 0.5–1.4)
DIFFERENTIAL METHOD: ABNORMAL
EOSINOPHIL # BLD AUTO: 0.1 K/UL (ref 0–0.5)
EOSINOPHIL NFR BLD: 3.5 % (ref 0–8)
ERYTHROCYTE [DISTWIDTH] IN BLOOD BY AUTOMATED COUNT: 14.2 % (ref 11.5–14.5)
EST. GFR  (NO RACE VARIABLE): >60 ML/MIN/1.73 M^2
ESTIMATED AVG GLUCOSE: 123 MG/DL (ref 68–131)
GLUCOSE SERPL-MCNC: 123 MG/DL (ref 70–110)
HBA1C MFR BLD: 5.9 % (ref 4–5.6)
HCT VFR BLD AUTO: 41.7 % (ref 40–54)
HGB BLD-MCNC: 13.5 G/DL (ref 14–18)
IMM GRANULOCYTES # BLD AUTO: 0.04 K/UL (ref 0–0.04)
IMM GRANULOCYTES NFR BLD AUTO: 1 % (ref 0–0.5)
LYMPHOCYTES # BLD AUTO: 1 K/UL (ref 1–4.8)
LYMPHOCYTES NFR BLD: 24.4 % (ref 18–48)
MCH RBC QN AUTO: 28.5 PG (ref 27–31)
MCHC RBC AUTO-ENTMCNC: 32.4 G/DL (ref 32–36)
MCV RBC AUTO: 88 FL (ref 82–98)
MONOCYTES # BLD AUTO: 0.5 K/UL (ref 0.3–1)
MONOCYTES NFR BLD: 12.1 % (ref 4–15)
NEUTROPHILS # BLD AUTO: 2.4 K/UL (ref 1.8–7.7)
NEUTROPHILS NFR BLD: 58.8 % (ref 38–73)
NRBC BLD-RTO: 0 /100 WBC
PLATELET # BLD AUTO: 123 K/UL (ref 150–450)
PMV BLD AUTO: 12.6 FL (ref 9.2–12.9)
POTASSIUM SERPL-SCNC: 4.3 MMOL/L (ref 3.5–5.1)
PROT SERPL-MCNC: 7 G/DL (ref 6–8.4)
RBC # BLD AUTO: 4.73 M/UL (ref 4.6–6.2)
SODIUM SERPL-SCNC: 137 MMOL/L (ref 136–145)
VIT B12 SERPL-MCNC: 259 PG/ML (ref 210–950)
WBC # BLD AUTO: 4.05 K/UL (ref 3.9–12.7)

## 2023-06-02 PROCEDURE — 83036 HEMOGLOBIN GLYCOSYLATED A1C: CPT | Performed by: FAMILY MEDICINE

## 2023-06-02 PROCEDURE — 82306 VITAMIN D 25 HYDROXY: CPT | Performed by: NURSE PRACTITIONER

## 2023-06-02 PROCEDURE — 80053 COMPREHEN METABOLIC PANEL: CPT | Performed by: NURSE PRACTITIONER

## 2023-06-02 PROCEDURE — 36415 COLL VENOUS BLD VENIPUNCTURE: CPT | Mod: PO | Performed by: NURSE PRACTITIONER

## 2023-06-02 PROCEDURE — 85025 COMPLETE CBC W/AUTO DIFF WBC: CPT | Mod: PO | Performed by: NURSE PRACTITIONER

## 2023-06-02 PROCEDURE — 82607 VITAMIN B-12: CPT | Performed by: FAMILY MEDICINE

## 2023-06-03 ENCOUNTER — PATIENT MESSAGE (OUTPATIENT)
Dept: FAMILY MEDICINE | Facility: CLINIC | Age: 68
End: 2023-06-03
Payer: MEDICARE

## 2023-06-04 NOTE — PROGRESS NOTES
PATIENT: Merrill Cummings  MRN: [id number]  DATE: 6/5/2023      Diagnosis:   1. Prostate cancer    2. Osteopenia, unspecified location    3. Muscular dystrophy    4. Anemia, unspecified type    5. Thrombocytopenia    6. Hypertension, unspecified type    7. Type 2 diabetes mellitus without complication, without long-term current use of insulin        Chief Complaint: Prostate Cancer (6 month follow up )      Subjective:    Initial History: Mr. Cummings is a 68 y.o. male with DMII, gout, HLD, HTN, muscular dystrophy, LAQUITA, urolithiasis, osteopenia who presents for follow up for prostate cancer.  The patient was initially diagnosed 12/20/21 with Dahlen score 4+3=7. PSA =21, stage IIIA. cT1c cN0 cM0.  The patient started on Lupron 2/22/22 and completed radiation under the care of Dr Hansel Almonte 6/2022.  The patient has been following with medical oncology for management of osteopenia with prolia during ADT.  Pt with last dose of Lupron 3/20/23 with 45mg given.  PT endorses weakness in his legs and shoulder which he attributes to muscular dystrophy.  The patient denies CP, cough, SOB, abdominal pain, nausea, vomiting, constipation, diarrhea.  The patient denies fever, chills, night sweats, weight loss, new lumps or bumps, easy bruising or bleeding.  Patient endorses lower back pain which is chronic.      Past Medical History:   Past Medical History:   Diagnosis Date    Calculus of gallbladder without cholecystitis without obstruction 12/20/2022    Charcot Mague Tooth muscular atrophy 2007    His father had it. Pt started with dropped foot and poor pectoral muscles. able to walk well.     Colon polyp     Diabetes mellitus, type 2     Elevated PSA     Foot drop, bilateral     Gait abnormality     Gallbladder polyp 1/5/2015    Gout     right ankle    H. pylori infection 02/01/2018    had EGD, undergoing treatment    HEARING LOSS     Hyperlipidemia     Hypertension     Kidney stone     Muscular dystrophy 2012    diagnosed  "neurology OF -wears leg braces prn    MVP (mitral valve prolapse)     LAQUITA (obstructive sleep apnea)     resolved with weight loss- no longer requires cpap    Prostate cancer 2022    Tubular adenoma of colon     Urolithiasis        Past Surgical HIstory:   Past Surgical History:   Procedure Laterality Date    ANKLE FRACTURE SURGERY      CIRCUMCISION      COLONOSCOPY  ~ or   ([place]?  [place]?)    "Nothing"    COLONOSCOPY N/A 10/2/2020    Procedure: COLONOSCOPY;  Surgeon: Vernon Houser Jr., MD;  Location: [facility];  Service: Endoscopy;  Laterality: N/A;    COLONOSCOPY W/ POLYPECTOMY  04/15/2015    [facility].   Two 1 to 3 mm polyps in the proximal ascending colon.  TUBULAR ADENOMA.   Internal hemorrhoids.  Otherwise normalcolon and TI.    ESOPHAGOGASTRODUODENOSCOPY      EXTRACORPOREAL SHOCK WAVE LITHOTRIPSY  , 2004    x 2 - dr chivo joshi ([facility])    FACIAL LACERATIONS REPAIR      as a child    MULTIPLE TOOTH EXTRACTIONS      ORIF ankle fracture      PROSTATE BIOPSY      X2       Family History:   Family History   Problem Relation Age of Onset    Diabetes Father     Hypertension Father     Muscular dystrophy Father     Prostate cancer Father         did no0t die of it    Lung cancer Father         prostate, did not die of/lung,  of    Glaucoma Father     Macular degeneration Father     Hypertension Mother     Glaucoma Mother     Macular degeneration Mother     Scoliosis Sister     Stroke Sister         massive - SUDDEN DEATH    Pulmonary embolism Paternal Grandfather     Prostate cancer Paternal Grandfather        Social History:  reports that he has never smoked. He has never used smokeless tobacco. He reports that he does not drink alcohol and does not use drugs.    Allergies:  Review of patient's allergies indicates:   Allergen Reactions    Oxybutynin chloride Other (See Comments)     Severe reflux  Other reaction(s): Other (See Comments)  Severe reflux    Esomeprazole magnesium Rash "       Medications:  Current Outpatient Medications   Medication Sig Dispense Refill    blood sugar diagnostic Strp To check BG one daily, to use with insurance preferred meter 100 strip 3    calcium-vitamin D3 (OYSTER SHELL CALCIUM-VIT D3) 500 mg-5 mcg (200 unit) per tablet Take 1 tablet by mouth 2 (two) times daily. 180 tablet 3    carvediloL (COREG) 12.5 MG tablet Take 12.5 mg by mouth 2 (two) times daily.       coenzyme Q10 100 mg capsule Take 100 mg by mouth once daily.      FLAXSEED ORAL Take by mouth. Flaxseed Ground-2 tablespoons daily      lancets Misc To check BG once daily, to use with insurance preferred meter 100 each 3    losartan (COZAAR) 100 MG tablet Take 1 tablet (100 mg total) by mouth once daily. 90 tablet 3    metFORMIN (GLUCOPHAGE) 1000 MG tablet TAKE 1 TABLET BY MOUTH TWICE A DAY (Patient taking differently: Take 500 mg by mouth daily with breakfast.) 180 tablet 3    tamsulosin (FLOMAX) 0.4 mg Cap TAKE 1 CAPSULE BY MOUTH EVERY DAY 90 capsule 2     No current facility-administered medications for this visit.       Review of Systems   Constitutional:  Negative for appetite change and unexpected weight change.   HENT:  Negative for mouth sores.    Eyes:  Negative for visual disturbance.   Respiratory:  Negative for cough and shortness of breath.    Cardiovascular:  Negative for chest pain.   Gastrointestinal:  Negative for abdominal pain and diarrhea.   Genitourinary:  Negative for frequency.   Musculoskeletal:  Positive for back pain.   Skin:  Negative for rash.   Neurological:  Positive for weakness. Negative for headaches.   Hematological:  Negative for adenopathy.   Psychiatric/Behavioral:  The patient is not nervous/anxious.      ECOG Performance Status: 1   Objective:      Vitals:   Vitals:    06/05/23 1126   BP: (!) 156/90   BP Location: Left arm   Patient Position: Sitting   BP Method: Medium (Manual)   Pulse: 65   Temp: 96.5 °F (35.8 °C)   TempSrc: Temporal   SpO2: 98%   Weight: 85.1 kg  "(187 lb 9.8 oz)   Height: 5' 10" (1.778 m)       Physical Exam  Constitutional:       General: He is not in acute distress.     Appearance: He is well-developed. He is not diaphoretic.   HENT:      Head: Normocephalic and atraumatic.   Cardiovascular:      Rate and Rhythm: Normal rate and regular rhythm.      Heart sounds: Normal heart sounds. No murmur heard.    No friction rub. No gallop.   Pulmonary:      Effort: Pulmonary effort is normal. No respiratory distress.      Breath sounds: Normal breath sounds. No wheezing or rales.   Chest:      Chest wall: No tenderness.   Abdominal:      General: Bowel sounds are normal. There is no distension.      Palpations: Abdomen is soft. There is no mass.      Tenderness: There is no abdominal tenderness. There is no rebound.   Musculoskeletal:      Cervical back: Normal range of motion.      Comments: Pt with abnormal gait   Lymphadenopathy:      Cervical: No cervical adenopathy.      Upper Body:      Right upper body: No supraclavicular or axillary adenopathy.      Left upper body: No supraclavicular or axillary adenopathy.   Skin:     General: Skin is warm and dry.      Findings: No erythema or rash.   Neurological:      Mental Status: He is alert and oriented to person, place, and time.   Psychiatric:         Behavior: Behavior normal.       Laboratory Data:  Lab Visit on 06/02/2023   Component Date Value Ref Range Status    Vit D, 25-Hydroxy 06/02/2023 30  30 - 96 ng/mL Final    Comment: Vitamin D deficiency.........<10 ng/mL                              Vitamin D insufficiency......10-29 ng/mL       Vitamin D sufficiency........> or equal to 30 ng/mL  Vitamin D toxicity............>100 ng/mL      WBC 06/02/2023 4.05  3.90 - 12.70 K/uL Final    RBC 06/02/2023 4.73  4.60 - 6.20 M/uL Final    Hemoglobin 06/02/2023 13.5 (L)  14.0 - 18.0 g/dL Final    Hematocrit 06/02/2023 41.7  40.0 - 54.0 % Final    MCV 06/02/2023 88  82 - 98 fL Final    MCH 06/02/2023 28.5  27.0 - 31.0 " pg Final    MCHC 06/02/2023 32.4  32.0 - 36.0 g/dL Final    RDW 06/02/2023 14.2  11.5 - 14.5 % Final    Platelets 06/02/2023 123 (L)  150 - 450 K/uL Final    MPV 06/02/2023 12.6  9.2 - 12.9 fL Final    Immature Granulocytes 06/02/2023 1.0 (H)  0.0 - 0.5 % Final    Gran # (ANC) 06/02/2023 2.4  1.8 - 7.7 K/uL Final    Immature Grans (Abs) 06/02/2023 0.04  0.00 - 0.04 K/uL Final    Comment: Mild elevation in immature granulocytes is non specific and   can be seen in a variety of conditions including stress response,   acute inflammation, trauma and pregnancy. Correlation with other   laboratory and clinical findings is essential.      Lymph # 06/02/2023 1.0  1.0 - 4.8 K/uL Final    Mono # 06/02/2023 0.5  0.3 - 1.0 K/uL Final    Eos # 06/02/2023 0.1  0.0 - 0.5 K/uL Final    Baso # 06/02/2023 0.05  0.00 - 0.20 K/uL Final    nRBC 06/02/2023 0  0 /100 WBC Final    Gran % 06/02/2023 58.8  38.0 - 73.0 % Final    Lymph % 06/02/2023 24.4  18.0 - 48.0 % Final    Mono % 06/02/2023 12.1  4.0 - 15.0 % Final    Eosinophil % 06/02/2023 3.5  0.0 - 8.0 % Final    Basophil % 06/02/2023 1.2  0.0 - 1.9 % Final    Differential Method 06/02/2023 Automated   Final    Sodium 06/02/2023 137  136 - 145 mmol/L Final    Potassium 06/02/2023 4.3  3.5 - 5.1 mmol/L Final    Chloride 06/02/2023 102  95 - 110 mmol/L Final    CO2 06/02/2023 26  23 - 29 mmol/L Final    Glucose 06/02/2023 123 (H)  70 - 110 mg/dL Final    BUN 06/02/2023 17  8 - 23 mg/dL Final    Creatinine 06/02/2023 0.7  0.5 - 1.4 mg/dL Final    Calcium 06/02/2023 9.7  8.7 - 10.5 mg/dL Final    Total Protein 06/02/2023 7.0  6.0 - 8.4 g/dL Final    Albumin 06/02/2023 3.9  3.5 - 5.2 g/dL Final    Total Bilirubin 06/02/2023 0.6  0.1 - 1.0 mg/dL Final    Comment: For infants and newborns, interpretation of results should be based  on gestational age, weight and in agreement with clinical  observations.    Premature Infant recommended reference ranges:  Up to 24 hours.............<8.0  mg/dL  Up to 48 hours............<12.0 mg/dL  3-5 days..................<15.0 mg/dL  6-29 days.................<15.0 mg/dL      Alkaline Phosphatase 06/02/2023 50 (L)  55 - 135 U/L Final    AST 06/02/2023 16  10 - 40 U/L Final    ALT 06/02/2023 10  10 - 44 U/L Final    Anion Gap 06/02/2023 9  8 - 16 mmol/L Final    eGFR 06/02/2023 >60.0  >60 mL/min/1.73 m^2 Final    Hemoglobin A1C 06/02/2023 5.9 (H)  4.0 - 5.6 % Final    Comment: ADA Screening Guidelines:  5.7-6.4%  Consistent with prediabetes  >or=6.5%  Consistent with diabetes    High levels of fetal hemoglobin interfere with the HbA1C  assay. Heterozygous hemoglobin variants (HbS, HgC, etc)do  not significantly interfere with this assay.   However, presence of multiple variants may affect accuracy.      Estimated Avg Glucose 06/02/2023 123  68 - 131 mg/dL Final    Vitamin B-12 06/02/2023 259  210 - 950 pg/mL Final         Imaging: Reviewed   Assessment:       1. Prostate cancer    2. Osteopenia, unspecified location    3. Muscular dystrophy    4. Anemia, unspecified type    5. Thrombocytopenia    6. Hypertension, unspecified type    7. Type 2 diabetes mellitus without complication, without long-term current use of insulin           Plan:     Prostate Cancer - pt with high risk prostate cancer diagnosed Amaury score 4+3=7. PSA =21, stage IIIA. cT1c cN0 cM0  -The patient started on Lupron 2/22/22 and completed radiation under the care of Dr Hansel Almonte 6/2022  -Last lupron dose was 3/20/23 with 45mg given  -Pt has completed 1.5 years of treatment with lupron.  Will avoid additional ADT given pt's weakness from muscular dystrophy and desire not to increase weakness  -Will check PSA today  -Pt to undergo further monitoring under the care of Urology with Dr Barr    Osteopenia - BMD scan on 2/11/22 showed osteopenia  -ADT increases risk of progression to osteoporosis  -Will proceed with prolia today  -Treatment to stop after ADT completed  -Ptt o continue taking  calcium until 12/2023    Muscular Dystrophy - Pt followed by neurology  -Neurology amnaging    Anemia - Pt with borderline low B12 on lab check 6/02/23  -Will cehck MMA  -If MMA elevated, I would recommend IM b12 injections    Thrombocytopenia - platelets 123k on 6/02/23  -Unclear if this is persistent  -Pt recommended to be referred back to us if this persists    HTN - pt on coreg, losartan  -BP slightly elevated   -PCP managing    DMII - metformin  Lab Results   Component Value Date    HGBA1C 5.9 (H) 06/02/2023   -PCP managing    Route Chart for Scheduling    Med Onc Chart Routing      Follow up with physician No follow up needed. Pt needs PSA and MMA done today.  Pt can proceed with prolia today.  His appt on 9/20/23 for lupron can be cancelled. His appt for prolia on 12/05/23 can be cancelled.  Pt does not need a follow up   Follow up with LEANN    Infusion scheduling note    Injection scheduling note    Labs    Imaging    Pharmacy appointment    Other referrals            Therapy Plan Information  DENOSUMAB (PROLIA) Q6M  Medications  denosumab (PROLIA) injection 60 mg  60 mg, Subcutaneous, Every 26 weeks      Bg Sigala MD  [facility]  Hematology and Oncology  [facility]   [address] [place] [address]   O: [phone]  F: [phone]

## 2023-06-05 ENCOUNTER — PATIENT MESSAGE (OUTPATIENT)
Dept: FAMILY MEDICINE | Facility: CLINIC | Age: 68
End: 2023-06-05
Payer: MEDICARE

## 2023-06-05 ENCOUNTER — OFFICE VISIT (OUTPATIENT)
Dept: HEMATOLOGY/ONCOLOGY | Facility: CLINIC | Age: 68
End: 2023-06-05
Payer: MEDICARE

## 2023-06-05 ENCOUNTER — INFUSION (OUTPATIENT)
Dept: INFUSION THERAPY | Facility: HOSPITAL | Age: 68
End: 2023-06-05
Attending: INTERNAL MEDICINE
Payer: MEDICARE

## 2023-06-05 ENCOUNTER — TELEPHONE (OUTPATIENT)
Dept: FAMILY MEDICINE | Facility: CLINIC | Age: 68
End: 2023-06-05
Payer: MEDICARE

## 2023-06-05 VITALS
HEIGHT: 70 IN | TEMPERATURE: 97 F | OXYGEN SATURATION: 98 % | HEART RATE: 65 BPM | WEIGHT: 187.63 LBS | SYSTOLIC BLOOD PRESSURE: 156 MMHG | RESPIRATION RATE: 20 BRPM | DIASTOLIC BLOOD PRESSURE: 90 MMHG | BODY MASS INDEX: 26.86 KG/M2

## 2023-06-05 VITALS
HEART RATE: 65 BPM | HEIGHT: 70 IN | OXYGEN SATURATION: 98 % | TEMPERATURE: 97 F | SYSTOLIC BLOOD PRESSURE: 156 MMHG | DIASTOLIC BLOOD PRESSURE: 90 MMHG | WEIGHT: 187.63 LBS | BODY MASS INDEX: 26.86 KG/M2

## 2023-06-05 DIAGNOSIS — M85.80 OSTEOPENIA, UNSPECIFIED LOCATION: ICD-10-CM

## 2023-06-05 DIAGNOSIS — D64.9 ANEMIA, UNSPECIFIED TYPE: ICD-10-CM

## 2023-06-05 DIAGNOSIS — D69.6 THROMBOCYTOPENIA: ICD-10-CM

## 2023-06-05 DIAGNOSIS — G71.00 MUSCULAR DYSTROPHY: ICD-10-CM

## 2023-06-05 DIAGNOSIS — I10 HYPERTENSION, UNSPECIFIED TYPE: ICD-10-CM

## 2023-06-05 DIAGNOSIS — C61 PROSTATE CANCER: Primary | ICD-10-CM

## 2023-06-05 DIAGNOSIS — Z79.818 ANDROGEN DEPRIVATION THERAPY: Primary | ICD-10-CM

## 2023-06-05 DIAGNOSIS — E78.5 HYPERLIPIDEMIA ASSOCIATED WITH TYPE 2 DIABETES MELLITUS: Primary | ICD-10-CM

## 2023-06-05 DIAGNOSIS — E11.69 HYPERLIPIDEMIA ASSOCIATED WITH TYPE 2 DIABETES MELLITUS: Primary | ICD-10-CM

## 2023-06-05 DIAGNOSIS — E11.9 TYPE 2 DIABETES MELLITUS WITHOUT COMPLICATION, WITHOUT LONG-TERM CURRENT USE OF INSULIN: ICD-10-CM

## 2023-06-05 PROCEDURE — 63600175 PHARM REV CODE 636 W HCPCS: Mod: JZ,JG,PN | Performed by: NURSE PRACTITIONER

## 2023-06-05 PROCEDURE — 99214 OFFICE O/P EST MOD 30 MIN: CPT | Mod: S$PBB,,, | Performed by: INTERNAL MEDICINE

## 2023-06-05 PROCEDURE — 99999 PR PBB SHADOW E&M-EST. PATIENT-LVL III: CPT | Mod: PBBFAC,,, | Performed by: INTERNAL MEDICINE

## 2023-06-05 PROCEDURE — 96372 THER/PROPH/DIAG INJ SC/IM: CPT | Mod: PN

## 2023-06-05 PROCEDURE — 99214 PR OFFICE/OUTPT VISIT, EST, LEVL IV, 30-39 MIN: ICD-10-PCS | Mod: S$PBB,,, | Performed by: INTERNAL MEDICINE

## 2023-06-05 PROCEDURE — 99213 OFFICE O/P EST LOW 20 MIN: CPT | Mod: PBBFAC,PN | Performed by: INTERNAL MEDICINE

## 2023-06-05 PROCEDURE — 99999 PR PBB SHADOW E&M-EST. PATIENT-LVL III: ICD-10-PCS | Mod: PBBFAC,,, | Performed by: INTERNAL MEDICINE

## 2023-06-05 RX ADMIN — DENOSUMAB 60 MG: 60 INJECTION SUBCUTANEOUS at 12:06

## 2023-06-05 NOTE — TELEPHONE ENCOUNTER
----- Message from Go Raza MD sent at 6/5/2023  2:45 PM CDT -----  Sugar looks a lot better.  B12 normal, but lower end.  It looks like his oncologist is already checking methylmalonic acid level for further evaluation on this..  Recommend recheck hemoglobin A1c, urine microalbumin, lipid panel mid November. My nurse will contact you to arrange.  Thanks,  Dr. Raza

## 2023-06-07 ENCOUNTER — TELEPHONE (OUTPATIENT)
Dept: HEMATOLOGY/ONCOLOGY | Facility: CLINIC | Age: 68
End: 2023-06-07
Payer: MEDICARE

## 2023-06-07 NOTE — TELEPHONE ENCOUNTER
Left message with this information.     ----- Message from Bg Sigala MD sent at 6/7/2023 11:00 AM CDT -----  Please call pt and let him know his PSA was <0.01 which is great.

## 2023-06-08 ENCOUNTER — PATIENT MESSAGE (OUTPATIENT)
Dept: HEMATOLOGY/ONCOLOGY | Facility: CLINIC | Age: 68
End: 2023-06-08
Payer: MEDICARE

## 2023-07-11 LAB
ALBUMIN/GLOB SERPL ELPH: 2.1 {RATIO} (ref 1.2–2.2)
ALBUMIN: 4.6 G/DL (ref 3.9–4.9)
ALP SERPL-CCNC: 49 IU/L (ref 44–121)
ALT SERPL W P-5'-P-CCNC: 9 IU/L (ref 0–44)
AST SERPL-CCNC: 18 IU/L (ref 0–40)
BILIRUB SERPL-MCNC: 0.5 MG/DL (ref 0–1.2)
BUN BLD-MCNC: 14 MG/DL (ref 8–27)
BUN/CREAT RATIO: 26 (ref 10–24)
CALCIUM SERPL-MCNC: 9.3 MG/DL (ref 8.6–10.2)
CHLORIDE SERPL-SCNC: 105 MMOL/L (ref 96–106)
CHOLESTEROL, TOTAL: 232 MG/DL (ref 100–199)
CO2 SERPL-SCNC: 21 MMOL/L (ref 20–29)
CREAT SERPL-MCNC: 0.5 MG/DL (ref 0.8–1.3)
EGFR: 108 ML/MIN/1.73
GLOBULIN SER CALC-MCNC: 2.2 G/DL (ref 1.5–4.5)
GLUCOSE SERPL-MCNC: 119 MG/DL (ref 70–99)
HBA1C MFR BLD: 6.3 % (ref 4.8–5.6)
HDLC SERPL-MCNC: 59 MG/DL
LDL/HDL RATIO: 2.7 (ref 0–3.6)
LDLC SERPL CALC-MCNC: 159 MG/DL (ref 0–99)
POTASSIUM SERPL-SCNC: 4 MMOL/L (ref 3.5–5.2)
PROT SNV-MCNC: 6.8 G/DL (ref 6–8.5)
SODIUM BLD-SCNC: 143 MMOL/L (ref 134–144)
TRIGL SERPL-MCNC: 79 MG/DL (ref 0–149)
VLDLC SERPL-MCNC: 14 MG/DL (ref 5–40)

## 2023-08-04 ENCOUNTER — PATIENT OUTREACH (OUTPATIENT)
Dept: ADMINISTRATIVE | Facility: HOSPITAL | Age: 68
End: 2023-08-04
Payer: MEDICARE

## 2023-08-04 NOTE — PROGRESS NOTES
Manually uploaded and linked Hemoglobin A1c, Lipid Panel, CMP to . Uploaded all other labs to media

## 2023-09-18 ENCOUNTER — TELEPHONE (OUTPATIENT)
Dept: HEMATOLOGY/ONCOLOGY | Facility: CLINIC | Age: 68
End: 2023-09-18
Payer: MEDICARE

## 2023-09-18 NOTE — TELEPHONE ENCOUNTER
Received phone call from Dr Collins's nurse, Bryan ([phone]).  Bryan stated pt called their office, due to receiving an notification from us to remind him of Lupron scheduled for 9/20/23, which the pt was in fact scheduled for.  According to Dr Sigala's note, this appt and any further Lupron and Prolia was to be cancelled.  Bryan also stated Dr Collins also stated pt is finished his treatment, completed.  Nurse notified infusion, spoke with Kaia Souza RN, to cancel Lupron and Prolia.

## 2023-10-02 ENCOUNTER — OFFICE VISIT (OUTPATIENT)
Dept: UROLOGY | Facility: CLINIC | Age: 68
End: 2023-10-02
Payer: MEDICARE

## 2023-10-02 VITALS — BODY MASS INDEX: 26.35 KG/M2 | WEIGHT: 184.06 LBS | HEIGHT: 70 IN

## 2023-10-02 DIAGNOSIS — C61 PROSTATE CANCER: Primary | ICD-10-CM

## 2023-10-02 LAB
BILIRUBIN, UA POC OHS: NEGATIVE
BLOOD, UA POC OHS: NEGATIVE
CLARITY, UA POC OHS: CLEAR
COLOR, UA POC OHS: YELLOW
GLUCOSE, UA POC OHS: NEGATIVE
KETONES, UA POC OHS: NEGATIVE
LEUKOCYTES, UA POC OHS: NEGATIVE
NITRITE, UA POC OHS: NEGATIVE
PH, UA POC OHS: 7.5
PROTEIN, UA POC OHS: NEGATIVE
SPECIFIC GRAVITY, UA POC OHS: 1.02
UROBILINOGEN, UA POC OHS: 0.2

## 2023-10-02 PROCEDURE — 99213 OFFICE O/P EST LOW 20 MIN: CPT | Mod: S$PBB,,, | Performed by: UROLOGY

## 2023-10-02 PROCEDURE — 99213 OFFICE O/P EST LOW 20 MIN: CPT | Mod: PBBFAC,PO | Performed by: UROLOGY

## 2023-10-02 PROCEDURE — 99999 PR PBB SHADOW E&M-EST. PATIENT-LVL III: ICD-10-PCS | Mod: PBBFAC,,, | Performed by: UROLOGY

## 2023-10-02 PROCEDURE — 81003 URINALYSIS AUTO W/O SCOPE: CPT | Mod: PBBFAC,PO | Performed by: UROLOGY

## 2023-10-02 PROCEDURE — 99999PBSHW POCT URINALYSIS(INSTRUMENT): Mod: PBBFAC,,,

## 2023-10-02 PROCEDURE — 99213 PR OFFICE/OUTPT VISIT, EST, LEVL III, 20-29 MIN: ICD-10-PCS | Mod: S$PBB,,, | Performed by: UROLOGY

## 2023-10-02 PROCEDURE — 99999PBSHW POCT URINALYSIS(INSTRUMENT): ICD-10-PCS | Mod: PBBFAC,,,

## 2023-10-02 PROCEDURE — 99999 PR PBB SHADOW E&M-EST. PATIENT-LVL III: CPT | Mod: PBBFAC,,, | Performed by: UROLOGY

## 2023-10-02 RX ORDER — HYDROCHLOROTHIAZIDE 25 MG/1
25 TABLET ORAL
COMMUNITY
Start: 2023-09-06

## 2023-10-02 RX ORDER — GLUCOSAM/CHON-MSM1/C/MANG/BOSW 500-416.6
TABLET ORAL
COMMUNITY
Start: 2023-07-20 | End: 2024-04-03 | Stop reason: SDUPTHER

## 2023-10-02 NOTE — PROGRESS NOTES
Subjective:       Patient ID: Merrill Cummings is a 68 y.o. male.    Chief Complaint: Follow-up    HPI    68-year-old with prostate cancer diagnosed in December 2021.  Group clinical stage IIIA. cT1c cN0 cM0, Mattoon score 4+3=7.  PSA = 21.   Patient's father and grandfather had prostate cancer.   He was treated by Dr. Puckett with a full course of radiation therapy and completed in June of 2022.  He is overall doing well.  He has no bothersome urinary symptoms.  Plan was 18 months of androgen deprivation.  He received his 1st Lupron injection in February of 2022.  Hot flashes have now nearly resolved.  Urine dipstick shows negative for all components.    Review of Systems   Constitutional:  Negative for fever.   Genitourinary:  Negative for dysuria and hematuria.       Objective:      Physical Exam  Vitals reviewed.   Constitutional:       Appearance: He is well-developed.   Pulmonary:      Effort: Pulmonary effort is normal.   Skin:     Findings: No rash.   Neurological:      Mental Status: He is alert and oriented to person, place, and time.         Assessment:       1. Prostate cancer        Plan:       Prostate cancer  -     POCT Urinalysis(Instrument)  -     Prostate Specific Antigen, Diagnostic; Future; Expected date: 10/02/2023

## 2023-11-17 ENCOUNTER — LAB VISIT (OUTPATIENT)
Dept: LAB | Facility: HOSPITAL | Age: 68
End: 2023-11-17
Attending: FAMILY MEDICINE
Payer: MEDICARE

## 2023-11-17 DIAGNOSIS — E11.69 HYPERLIPIDEMIA ASSOCIATED WITH TYPE 2 DIABETES MELLITUS: ICD-10-CM

## 2023-11-17 DIAGNOSIS — E78.5 HYPERLIPIDEMIA ASSOCIATED WITH TYPE 2 DIABETES MELLITUS: ICD-10-CM

## 2023-11-17 DIAGNOSIS — E11.9 TYPE 2 DIABETES MELLITUS WITHOUT COMPLICATION, WITHOUT LONG-TERM CURRENT USE OF INSULIN: ICD-10-CM

## 2023-11-17 LAB
CHOLEST SERPL-MCNC: 226 MG/DL (ref 120–199)
CHOLEST/HDLC SERPL: 3.9 {RATIO} (ref 2–5)
ESTIMATED AVG GLUCOSE: 137 MG/DL (ref 68–131)
HBA1C MFR BLD: 6.4 % (ref 4–5.6)
HDLC SERPL-MCNC: 58 MG/DL (ref 40–75)
HDLC SERPL: 25.7 % (ref 20–50)
LDLC SERPL CALC-MCNC: 144.4 MG/DL (ref 63–159)
NONHDLC SERPL-MCNC: 168 MG/DL
TRIGL SERPL-MCNC: 118 MG/DL (ref 30–150)

## 2023-11-17 PROCEDURE — 83036 HEMOGLOBIN GLYCOSYLATED A1C: CPT | Performed by: FAMILY MEDICINE

## 2023-11-17 PROCEDURE — 80061 LIPID PANEL: CPT | Performed by: FAMILY MEDICINE

## 2023-11-17 PROCEDURE — 36415 COLL VENOUS BLD VENIPUNCTURE: CPT | Mod: PO | Performed by: FAMILY MEDICINE

## 2023-12-01 ENCOUNTER — PATIENT MESSAGE (OUTPATIENT)
Dept: FAMILY MEDICINE | Facility: CLINIC | Age: 68
End: 2023-12-01
Payer: MEDICARE

## 2023-12-14 RX ORDER — TAMSULOSIN HYDROCHLORIDE 0.4 MG/1
CAPSULE ORAL
Qty: 90 CAPSULE | Refills: 0 | Status: SHIPPED | OUTPATIENT
Start: 2023-12-14 | End: 2024-01-19

## 2023-12-14 NOTE — TELEPHONE ENCOUNTER
Refill Routing Note   Medication(s) are not appropriate for processing by [facility] for the following reason(s):        Drug-disease interaction    [facility] action(s):  Defer   Requires appointment : Yes        Medication Therapy Plan: Prostate Cancer is on problem list      Appointments  past 12m or future 3m with PCP    Date Provider   Last Visit   12/8/2022 Go Raza MD   Next Visit   Visit date not found Go Raza MD   ED visits in past 90 days: 0        Note composed:6:42 AM 12/14/2023

## 2023-12-14 NOTE — TELEPHONE ENCOUNTER
Care Due:                  Date            Visit Type   Department     Provider  --------------------------------------------------------------------------------                                EP -                              PRIMARY      [facility] FAMILY  Last Visit: 12-      CARE ([facility])   MEDICINE       Go Raza  Next Visit: None Scheduled  None         None Found                                                            Last  Test          Frequency    Reason                     Performed    Due Date  --------------------------------------------------------------------------------    Office Visit  15 months..  losartan, metFORMIN,       12- 03-                             tamsulosin...............    [employer] Embedded Care Due Messages. Reference number: [id number].   12/14/2023 12:49:55 AM CST

## 2023-12-18 ENCOUNTER — PATIENT MESSAGE (OUTPATIENT)
Dept: HEMATOLOGY/ONCOLOGY | Facility: CLINIC | Age: 68
End: 2023-12-18
Payer: MEDICARE

## 2023-12-30 DIAGNOSIS — E11.9 TYPE 2 DIABETES MELLITUS WITHOUT COMPLICATION, WITHOUT LONG-TERM CURRENT USE OF INSULIN: ICD-10-CM

## 2023-12-30 NOTE — TELEPHONE ENCOUNTER
No care due was identified.  [facility] Embedded Care Due Messages. Reference number: [id number].   12/30/2023 7:29:26 AM CST

## 2023-12-31 RX ORDER — CALCIUM CITRATE/VITAMIN D3 200MG-6.25
TABLET ORAL
Qty: 100 STRIP | Refills: 3 | Status: SHIPPED | OUTPATIENT
Start: 2023-12-31

## 2024-01-01 NOTE — TELEPHONE ENCOUNTER
Refill Routing Note   Medication(s) are not appropriate for processing by [facility] for the following reason(s):        Required vitals abnormal  No active prescription written by provider    [facility] action(s):  Defer  Approve             Appointments  past 12m or future 3m with PCP    Date Provider   Last Visit   12/8/2022 Go Raza MD   Next Visit   1/19/2024 Go Raza MD   ED visits in past 90 days: 0        Note composed:9:50 PM 12/31/2023

## 2024-01-02 RX ORDER — CARVEDILOL 12.5 MG/1
12.5 TABLET ORAL 2 TIMES DAILY
Qty: 180 TABLET | Refills: 0 | Status: SHIPPED | OUTPATIENT
Start: 2024-01-02

## 2024-01-18 ENCOUNTER — PATIENT MESSAGE (OUTPATIENT)
Dept: HEMATOLOGY/ONCOLOGY | Facility: CLINIC | Age: 69
End: 2024-01-18
Payer: MEDICARE

## 2024-01-18 ENCOUNTER — PATIENT MESSAGE (OUTPATIENT)
Dept: FAMILY MEDICINE | Facility: CLINIC | Age: 69
End: 2024-01-18
Payer: MEDICARE

## 2024-01-18 ENCOUNTER — PATIENT MESSAGE (OUTPATIENT)
Dept: UROLOGY | Facility: CLINIC | Age: 69
End: 2024-01-18
Payer: MEDICARE

## 2024-01-19 ENCOUNTER — LAB VISIT (OUTPATIENT)
Dept: LAB | Facility: HOSPITAL | Age: 69
End: 2024-01-19
Attending: FAMILY MEDICINE
Payer: MEDICARE

## 2024-01-19 ENCOUNTER — OFFICE VISIT (OUTPATIENT)
Dept: FAMILY MEDICINE | Facility: CLINIC | Age: 69
End: 2024-01-19
Payer: MEDICARE

## 2024-01-19 VITALS
SYSTOLIC BLOOD PRESSURE: 154 MMHG | BODY MASS INDEX: 27.13 KG/M2 | HEART RATE: 88 BPM | DIASTOLIC BLOOD PRESSURE: 90 MMHG | HEIGHT: 70 IN | WEIGHT: 189.5 LBS | TEMPERATURE: 97 F

## 2024-01-19 DIAGNOSIS — I70.0 CALCIFICATION OF AORTA: ICD-10-CM

## 2024-01-19 DIAGNOSIS — E11.59 HYPERTENSION ASSOCIATED WITH DIABETES: Primary | ICD-10-CM

## 2024-01-19 DIAGNOSIS — C61 PROSTATE CANCER: ICD-10-CM

## 2024-01-19 DIAGNOSIS — I15.2 HYPERTENSION ASSOCIATED WITH DIABETES: Primary | ICD-10-CM

## 2024-01-19 DIAGNOSIS — M21.372 BILATERAL FOOT-DROP: ICD-10-CM

## 2024-01-19 DIAGNOSIS — D69.6 THROMBOCYTOPENIA: ICD-10-CM

## 2024-01-19 DIAGNOSIS — G70.9: ICD-10-CM

## 2024-01-19 DIAGNOSIS — E11.69 HYPERLIPIDEMIA ASSOCIATED WITH TYPE 2 DIABETES MELLITUS: ICD-10-CM

## 2024-01-19 DIAGNOSIS — E78.5 HYPERLIPIDEMIA ASSOCIATED WITH TYPE 2 DIABETES MELLITUS: ICD-10-CM

## 2024-01-19 DIAGNOSIS — E11.9 TYPE 2 DIABETES MELLITUS WITHOUT COMPLICATION, WITHOUT LONG-TERM CURRENT USE OF INSULIN: ICD-10-CM

## 2024-01-19 DIAGNOSIS — M21.371 BILATERAL FOOT-DROP: ICD-10-CM

## 2024-01-19 LAB — COMPLEXED PSA SERPL-MCNC: <0.01 NG/ML (ref 0–4)

## 2024-01-19 PROCEDURE — 84153 ASSAY OF PSA TOTAL: CPT | Performed by: UROLOGY

## 2024-01-19 PROCEDURE — 99999 PR PBB SHADOW E&M-EST. PATIENT-LVL IV: CPT | Mod: PBBFAC,,, | Performed by: FAMILY MEDICINE

## 2024-01-19 PROCEDURE — 99214 OFFICE O/P EST MOD 30 MIN: CPT | Mod: PBBFAC,PO | Performed by: FAMILY MEDICINE

## 2024-01-19 PROCEDURE — 36415 COLL VENOUS BLD VENIPUNCTURE: CPT | Mod: PO | Performed by: UROLOGY

## 2024-01-19 PROCEDURE — 99214 OFFICE O/P EST MOD 30 MIN: CPT | Mod: S$PBB,,, | Performed by: FAMILY MEDICINE

## 2024-01-19 RX ORDER — ROSUVASTATIN CALCIUM 20 MG/1
20 TABLET, COATED ORAL DAILY
Qty: 90 TABLET | Refills: 3 | Status: SHIPPED | OUTPATIENT
Start: 2024-01-19

## 2024-01-19 RX ORDER — LOSARTAN POTASSIUM 100 MG/1
100 TABLET ORAL NIGHTLY
Qty: 90 TABLET | Refills: 0 | Status: SHIPPED | OUTPATIENT
Start: 2024-01-19 | End: 2024-04-15 | Stop reason: SDUPTHER

## 2024-01-19 RX ORDER — METFORMIN HYDROCHLORIDE 1000 MG/1
1000 TABLET ORAL
Qty: 90 TABLET | Refills: 3 | Status: SHIPPED | OUTPATIENT
Start: 2024-01-19 | End: 2024-02-12

## 2024-01-20 NOTE — PROGRESS NOTES
Follow-up diabetes.  Blood pressure elevated today, states usually higher in the morning, much better in the evening..  Lipids not currently on statin..  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  Uses ankle-foot orthotic .  He was diagnosed with prostate cancer treated with radiation and antiandrogen treatment.  .  Aortic calcification asymptomatic.  He had mild thrombocytopenia on last CBC.    Merrill was seen today for annual exam.    Diagnoses and all orders for this visit:    Hypertension associated with diabetes    Hyperlipidemia associated with type 2 diabetes mellitus  -     Lipid Panel; Future  -     Comprehensive Metabolic Panel; Future  -     Hemoglobin A1C; Future    Prostate cancer    Neuromyopathy syndrome    Calcification of aorta    Thrombocytopenia  -     CBC Auto Differential; Future    Bilateral foot-drop    Type 2 diabetes mellitus without complication, without long-term current use of insulin  -     Ambulatory referral/consult to Ophthalmology; Future    Other orders  -     rosuvastatin (CRESTOR) 20 MG tablet; Take 1 tablet (20 mg total) by mouth once daily.  -     metFORMIN (GLUCOPHAGE) 1000 MG tablet; Take 1 tablet (1,000 mg total) by mouth daily with breakfast.  -     losartan (COZAAR) 100 MG tablet; Take 1 tablet (100 mg total) by mouth nightly.      Reviewed recent laboratory stable conditions controlled.  Change losartan to evening time.  Follow-up laboratory scheduled for May..  Continue follow-up with Oncology and Urology.  States pending eye exam.  Recheck blood pressure with nurse in 2 weeks            Diabetes Management Status    Statin: Taking  ACE/ARB: Taking    Screening or Prevention Patient's value Goal Complete/Controlled?   HgA1C Testing and Control   Lab Results   Component Value Date    HGBA1C 6.4 (H) 11/17/2023       "Annually/Less than 8% Yes   Lipid profile : 11/17/2023 Annually Yes   LDL control Lab Results   Component Value Date    LDLCALC 144.4 11/17/2023    Annually/Less than 100 mg/dl  No   Nephropathy screening Lab Results   Component Value Date    LABMICR 5.0 11/17/2023     No results found for: "PROTEINUA"  No results found for: "UTPCR"   Annually Yes   Blood pressure BP Readings from Last 1 Encounters:   01/19/24 (!) 154/90    Less than 140/90 No   Dilated retinal exam : 02/27/2023 Annually Yes   Foot exam   : 01/19/2024 Annually Yes           Past Medical History:  Past Medical History:   Diagnosis Date    Calculus of gallbladder without cholecystitis without obstruction 12/20/2022    Charcot Mague Tooth muscular atrophy 2007    His father had it. Pt started with dropped foot and poor pectoral muscles. able to walk well.     Colon polyp     Diabetes mellitus, type 2     Elevated PSA     Foot drop, bilateral     Gait abnormality     Gallbladder polyp 1/5/2015    Gout     right ankle    H. pylori infection 02/01/2018    had EGD, undergoing treatment    HEARING LOSS     Hyperlipidemia     Hypertension     Kidney stone     Muscular dystrophy 2012    diagnosed neurology [facility] -wears leg braces prn    MVP (mitral valve prolapse)     LAQUITA (obstructive sleep apnea)     resolved with weight loss- no longer requires cpap    Prostate cancer 2/28/2022    Tubular adenoma of colon     Urolithiasis      Past Surgical History:   Procedure Laterality Date    ANKLE FRACTURE SURGERY      CIRCUMCISION      COLONOSCOPY  ~2004 or 05  ([place]?  [place]?)    "Nothing"    COLONOSCOPY N/A 10/2/2020    Procedure: COLONOSCOPY;  Surgeon: Vernon Houser Jr., MD;  Location: [facility];  Service: Endoscopy;  Laterality: N/A;    COLONOSCOPY W/ POLYPECTOMY  04/15/2015    [facility].   Two 1 to 3 mm polyps in the proximal ascending colon.  TUBULAR ADENOMA.   Internal hemorrhoids.  Otherwise normalcolon and TI.    ESOPHAGOGASTRODUODENOSCOPY      " EXTRACORPOREAL SHOCK WAVE LITHOTRIPSY  2002, 2004    x 2 - dr chivo joshi ([facility])    FACIAL LACERATIONS REPAIR      as a child    MULTIPLE TOOTH EXTRACTIONS      ORIF ankle fracture  1988    PROSTATE BIOPSY      X2     Review of patient's allergies indicates:   Allergen Reactions    Oxybutynin chloride Other (See Comments)     Severe reflux  Other reaction(s): Other (See Comments)  Severe reflux    Esomeprazole magnesium Rash     Current Outpatient Medications on File Prior to Visit   Medication Sig Dispense Refill    carvediloL (COREG) 12.5 MG tablet TAKE 1 TABLET BY MOUTH TWICE A  tablet 0    coenzyme Q10 100 mg capsule Take 100 mg by mouth once daily.      FLAXSEED ORAL Take by mouth. Flaxseed Ground-2 tablespoons daily      hydroCHLOROthiazide (HYDRODIURIL) 25 MG tablet Take 25 mg by mouth.      [DISCONTINUED] losartan (COZAAR) 100 MG tablet Take 1 tablet (100 mg total) by mouth once daily. 90 tablet 3    [DISCONTINUED] metFORMIN (GLUCOPHAGE) 1000 MG tablet TAKE 1 TABLET BY MOUTH TWICE A DAY (Patient taking differently: Take 1,000 mg by mouth daily with breakfast. Takes once a day) 180 tablet 3    [DISCONTINUED] tamsulosin (FLOMAX) 0.4 mg Cap TAKE 1 CAPSULE BY MOUTH EVERY DAY 90 capsule 0    TRUE METRIX GLUCOSE TEST STRIP Strp USE TO CHECK BLOOD GLUCONSE ONCE DAILY 100 strip 3    TRUEPLUS LANCETS 30 gauge Misc USE TO CHECK BLOOD GLUCOSE ONCE DAILY      [DISCONTINUED] calcium-vitamin D3 (OYSTER SHELL CALCIUM-VIT D3) 500 mg-5 mcg (200 unit) per tablet Take 1 tablet by mouth 2 (two) times daily. 180 tablet 3     No current facility-administered medications on file prior to visit.     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     Comment: [employer], [email or web address]    Occupation: avid bicycle rider, rides twice a day     Comment: 10 miles a day    Tobacco Use    Smoking status: Never     Smokeless tobacco: Never   Substance and Sexual Activity    Alcohol use: No    Drug use: No    Sexual activity: Never     Social Determinants of Health     Financial Resource Strain: Low Risk  (2024)    Overall Financial Resource Strain (CARDIA)     Difficulty of Paying Living Expenses: Not hard at all   Food Insecurity: No Food Insecurity (2024)    Hunger Vital Sign     Worried About Running Out of Food in the Last Year: Never true     Ran Out of Food in the Last Year: Never true   Transportation Needs: No Transportation Needs (2024)    PRAPARE - Transportation     Lack of Transportation (Medical): No     Lack of Transportation (Non-Medical): No   Physical Activity: Insufficiently Active (2024)    Exercise Vital Sign     Days of Exercise per Week: 1 day     Minutes of Exercise per Session: 10 min   Stress: No Stress Concern Present (2024)    [employer] - Occupational Stress Questionnaire     Feeling of Stress : Not at all   Social Connections: Unknown (2024)    Social Connection and Isolation Panel [NHANES]     Frequency of Communication with Friends and Family: Three times a week     Frequency of Social Gatherings with Friends and Family: Once a week     Active Member of Clubs or Organizations: No     Attends Club or Organization Meetings: Never     Marital Status:    Housing Stability: Low Risk  (2024)    Housing Stability Vital Sign     Unable to Pay for Housing in the Last Year: No     Number of Places Lived in the Last Year: 1     Unstable Housing in the Last Year: No     Family History   Problem Relation Age of Onset    Diabetes Father     Hypertension Father     Muscular dystrophy Father     Prostate cancer Father         did no0t die of it    Lung cancer Father         prostate, did not die of/lung,  of    Glaucoma Father     Macular degeneration Father     Hypertension Mother     Glaucoma Mother     Macular degeneration Mother      "Scoliosis Sister     Stroke Sister         massive - SUDDEN DEATH    Pulmonary embolism Paternal Grandfather     Prostate cancer Paternal Grandfather            ROS:  GENERAL: No fever, chills,  or significant weight changes.   CARDIOVASCULAR: Denies chest pain, PND, orthopnea or reduced exercise tolerance.  ABDOMEN: Appetite fine. Denies diarrhea, abdominal pain, hematemesis or blood in stool.  URINARY: No flank pain, dysuria or hematuria.      OBJECTIVE:   Vitals:    01/19/24 0804 01/19/24 0828   BP: (!) 159/97 (!) 154/90   Pulse: 88    Temp: 97.2 °F (36.2 °C)    TempSrc: Temporal    Weight: 86 kg (189 lb 8 oz)    Height: 5' 10" (1.778 m)      Wt Readings from Last 3 Encounters:   01/19/24 86 kg (189 lb 8 oz)   10/02/23 83.5 kg (184 lb 1.4 oz)   06/05/23 85.1 kg (187 lb 9.8 oz)       APPEARANCE: Well nourished, well developed, in no acute distress.   HEAD: Normocephalic. Atraumatic. No sinus tenderness.   EYES:   Right eye: Pupil reactive. Conjunctiva clear.   Left eye: Pupil reactive. Conjunctiva clear.   Both fundi: Grossly normal to nondilated exam. EOMI.   EARS: TM's intact. Light reflex normal. No retraction or perforation.   NOSE: clear.   MOUTH & THROAT: No pharyngeal erythema or exudate. No lesions.   NECK: No bruits. No JVD. No cervical lymphadenopathy. No thyromegaly.   CHEST: Breath sounds clear bilaterally. Normal respiratory effort   CARDIOVASCULAR: Normal rate. Regular rhythm. No murmurs. No rub. No gallops.   ABDOMEN: Bowel sounds normal. Soft. No tenderness. No organomegaly.   PERIPHERAL VASCULAR: No cyanosis. No clubbing. No edema.   NEUROLOGIC:  Cranial nerves intact.  Bilateral foot drop  Feet:Sensation in the feet mildly decreased to monofilament testing especially at the heels more so on the right.  Some hammertoes  No significant foot lesions.Pulses palpable.  MENTAL STATUS: Alert. Oriented x 3.           "

## 2024-01-29 ENCOUNTER — OFFICE VISIT (OUTPATIENT)
Dept: FAMILY MEDICINE | Facility: CLINIC | Age: 69
End: 2024-01-29
Payer: MEDICARE

## 2024-01-29 ENCOUNTER — HOSPITAL ENCOUNTER (OUTPATIENT)
Dept: RADIOLOGY | Facility: HOSPITAL | Age: 69
Discharge: HOME | End: 2024-01-29
Attending: FAMILY MEDICINE
Payer: MEDICARE

## 2024-01-29 VITALS
SYSTOLIC BLOOD PRESSURE: 131 MMHG | BODY MASS INDEX: 26.8 KG/M2 | HEART RATE: 74 BPM | WEIGHT: 187.19 LBS | HEIGHT: 70 IN | DIASTOLIC BLOOD PRESSURE: 78 MMHG | TEMPERATURE: 97 F

## 2024-01-29 DIAGNOSIS — E11.59 HYPERTENSION ASSOCIATED WITH DIABETES: ICD-10-CM

## 2024-01-29 DIAGNOSIS — M79.89 LEFT LEG SWELLING: Primary | ICD-10-CM

## 2024-01-29 DIAGNOSIS — I15.2 HYPERTENSION ASSOCIATED WITH DIABETES: ICD-10-CM

## 2024-01-29 DIAGNOSIS — R60.9 EDEMA, UNSPECIFIED TYPE: ICD-10-CM

## 2024-01-29 DIAGNOSIS — M79.89 LEFT LEG SWELLING: ICD-10-CM

## 2024-01-29 PROCEDURE — 99213 OFFICE O/P EST LOW 20 MIN: CPT | Mod: S$PBB,,, | Performed by: FAMILY MEDICINE

## 2024-01-29 PROCEDURE — 99214 OFFICE O/P EST MOD 30 MIN: CPT | Mod: PBBFAC,25,PO | Performed by: FAMILY MEDICINE

## 2024-01-29 PROCEDURE — 93971 EXTREMITY STUDY: CPT | Mod: TC,PO,LT

## 2024-01-29 PROCEDURE — 99999 PR PBB SHADOW E&M-EST. PATIENT-LVL IV: CPT | Mod: PBBFAC,,, | Performed by: FAMILY MEDICINE

## 2024-01-29 PROCEDURE — 93971 EXTREMITY STUDY: CPT | Mod: 26,LT,, | Performed by: STUDENT IN AN ORGANIZED HEALTH CARE EDUCATION/TRAINING PROGRAM

## 2024-01-30 NOTE — PROGRESS NOTES
"Spouse noted that patient's left leg seem to be slightly red and swollen.  Denies any symptoms.  No pain.  No fever.  Hypertension home blood pressure readings averaging 131/78 over numerous readings.    Merrill was seen today for  calf redness.    Diagnoses and all orders for this visit:    Left leg swelling  -     US Lower Extremity Veins Left; Future    Hypertension associated with diabetes    Edema, unspecified type  -     US Lower Extremity Veins Left; Future    Appears she may have some mild venous insufficiency.  Will rule out clot as above.  Continue current antihypertensive treatment.  He can try to use compression stocking.              Past Medical History:  Past Medical History:   Diagnosis Date    Calculus of gallbladder without cholecystitis without obstruction 12/20/2022    Charcot Mague Tooth muscular atrophy 2007    His father had it. Pt started with dropped foot and poor pectoral muscles. able to walk well.     Colon polyp     Diabetes mellitus, type 2     Elevated PSA     Foot drop, bilateral     Gait abnormality     Gallbladder polyp 1/5/2015    Gout     right ankle    H. pylori infection 02/01/2018    had EGD, undergoing treatment    HEARING LOSS     Hyperlipidemia     Hypertension     Kidney stone     Muscular dystrophy 2012    diagnosed neurology [facility] -wears leg braces prn    MVP (mitral valve prolapse)     LAQUITA (obstructive sleep apnea)     resolved with weight loss- no longer requires cpap    Prostate cancer 2/28/2022    Tubular adenoma of colon     Urolithiasis      Past Surgical History:   Procedure Laterality Date    ANKLE FRACTURE SURGERY      CIRCUMCISION      COLONOSCOPY  ~2004 or 05  ([place]  [place]?)    "Nothing"    COLONOSCOPY N/A 10/2/2020    Procedure: COLONOSCOPY;  Surgeon: Vernon Houser Jr., MD;  Location: [facility];  Service: Endoscopy;  Laterality: N/A;    COLONOSCOPY W/ POLYPECTOMY  04/15/2015    [facility].   Two 1 to 3 mm polyps in the proximal ascending colon.  TUBULAR " ADENOMA.   Internal hemorrhoids.  Otherwise normalcolon and TI.    ESOPHAGOGASTRODUODENOSCOPY      EXTRACORPOREAL SHOCK WAVE LITHOTRIPSY  2002, 2004    x 2 - dr chivo joshi ([facility])    FACIAL LACERATIONS REPAIR      as a child    MULTIPLE TOOTH EXTRACTIONS      ORIF ankle fracture  1988    PROSTATE BIOPSY      X2     Review of patient's allergies indicates:   Allergen Reactions    Oxybutynin chloride Other (See Comments)     Severe reflux  Other reaction(s): Other (See Comments)  Severe reflux    Esomeprazole magnesium Rash     Current Outpatient Medications on File Prior to Visit   Medication Sig Dispense Refill    carvediloL (COREG) 12.5 MG tablet TAKE 1 TABLET BY MOUTH TWICE A  tablet 0    coenzyme Q10 100 mg capsule Take 100 mg by mouth once daily.      FLAXSEED ORAL Take by mouth. Flaxseed Ground-2 tablespoons daily      hydroCHLOROthiazide (HYDRODIURIL) 25 MG tablet Take 25 mg by mouth.      losartan (COZAAR) 100 MG tablet Take 1 tablet (100 mg total) by mouth nightly. 90 tablet 0    metFORMIN (GLUCOPHAGE) 1000 MG tablet Take 1 tablet (1,000 mg total) by mouth daily with breakfast. 90 tablet 3    rosuvastatin (CRESTOR) 20 MG tablet Take 1 tablet (20 mg total) by mouth once daily. 90 tablet 3    TRUE METRIX GLUCOSE TEST STRIP Strp USE TO CHECK BLOOD GLUCONSE ONCE DAILY 100 strip 3    TRUEPLUS LANCETS 30 gauge Misc USE TO CHECK BLOOD GLUCOSE ONCE DAILY       No current facility-administered medications on file prior to visit.     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     Comment: [employer] [place], [employer]    Occupation: avid bicycle rider, rides twice a day     Comment: 10 miles a day    Tobacco Use    Smoking status: Never    Smokeless tobacco: Never   Substance and Sexual Activity    Alcohol use: No    Drug use: No    Sexual activity: Never     Social Determinants of Health      Financial Resource Strain: Low Risk  (2024)    Overall Financial Resource Strain (CARDIA)     Difficulty of Paying Living Expenses: Not hard at all   Food Insecurity: No Food Insecurity (2024)    Hunger Vital Sign     Worried About Running Out of Food in the Last Year: Never true     Ran Out of Food in the Last Year: Never true   Transportation Needs: No Transportation Needs (2024)    PRAPARE - Transportation     Lack of Transportation (Medical): No     Lack of Transportation (Non-Medical): No   Physical Activity: Insufficiently Active (2024)    Exercise Vital Sign     Days of Exercise per Week: 1 day     Minutes of Exercise per Session: 10 min   Stress: No Stress Concern Present (2024)    [employer] - Occupational Stress Questionnaire     Feeling of Stress : Not at all   Social Connections: Unknown (2024)    Social Connection and Isolation Panel [NHANES]     Frequency of Communication with Friends and Family: Three times a week     Frequency of Social Gatherings with Friends and Family: Once a week     Active Member of Clubs or Organizations: No     Attends Club or Organization Meetings: Never     Marital Status:    Housing Stability: Low Risk  (2024)    Housing Stability Vital Sign     Unable to Pay for Housing in the Last Year: No     Number of Places Lived in the Last Year: 1     Unstable Housing in the Last Year: No     Family History   Problem Relation Age of Onset    Diabetes Father     Hypertension Father     Muscular dystrophy Father     Prostate cancer Father         did no0t die of it    Lung cancer Father         prostate, did not die of/lung,  of    Glaucoma Father     Macular degeneration Father     Hypertension Mother     Glaucoma Mother     Macular degeneration Mother     Scoliosis Sister     Stroke Sister         massive - SUDDEN DEATH    Pulmonary embolism Paternal Grandfather     Prostate cancer Paternal Grandfather   "      Answers submitted by the patient for this visit:  Review of Systems Questionnaire (Submitted on 1/27/2024)  activity change: No  unexpected weight change: No  neck pain: Yes  hearing loss: Yes  rhinorrhea: Yes  trouble swallowing: No  eye discharge: No  visual disturbance: No  chest tightness: No  wheezing: No  chest pain: No  palpitations: No  blood in stool: No  constipation: No  vomiting: No  diarrhea: No  polydipsia: No  polyuria: No  difficulty urinating: No  urgency: Yes  hematuria: No  joint swelling: No  arthralgias: No  headaches: No  weakness: No  confusion: No  dysphoric mood: No      Vitals:    01/29/24 1025 01/29/24 1100 01/29/24 1110   BP: (!) 161/90 (!) 154/90 131/78   Pulse: 74     Temp: 97 °F (36.1 °C)     TempSrc: Temporal     Weight: 84.9 kg (187 lb 3.2 oz)     Height: 5' 10" (1.778 m)         Wt Readings from Last 3 Encounters:   01/29/24 84.9 kg (187 lb 3.2 oz)   01/19/24 86 kg (189 lb 8 oz)   10/02/23 83.5 kg (184 lb 1.4 oz)       APPEARANCE: Well nourished, well developed, in no acute distress.    HEAD: Normocephalic.  Atraumatic.  EYES:   Right eye: Pupil reactive.  Conjunctiva clear.    Left eye: Pupil reactive.  Conjunctiva clear.    NECK: Supple. No bruits.  No JVD.  No cervical lymphadenopathy.  No thyromegaly.    CHEST: Breath sounds clear bilaterally.  Normal respiratory effort  CARDIOVASCULAR: Normal rate.  Regular rhythm.  No murmurs.  No rub.  No gallops.  MENTAL STATUS: Alert.  Oriented x 3.  Minimal erythema left calf.  He has dry skin both lower legs.  Left calf is slightly larger than the right.  Footdrop bilateral.  There is no tenderness.  No fluctuance.  No rash.  He has mild venous stasis change.  "

## 2024-02-01 ENCOUNTER — PATIENT MESSAGE (OUTPATIENT)
Dept: FAMILY MEDICINE | Facility: CLINIC | Age: 69
End: 2024-02-01

## 2024-02-01 ENCOUNTER — CLINICAL SUPPORT (OUTPATIENT)
Dept: FAMILY MEDICINE | Facility: CLINIC | Age: 69
End: 2024-02-01
Payer: MEDICARE

## 2024-02-01 ENCOUNTER — LAB VISIT (OUTPATIENT)
Dept: LAB | Facility: HOSPITAL | Age: 69
End: 2024-02-01
Attending: FAMILY MEDICINE
Payer: MEDICARE

## 2024-02-01 VITALS — SYSTOLIC BLOOD PRESSURE: 136 MMHG | HEART RATE: 67 BPM | DIASTOLIC BLOOD PRESSURE: 78 MMHG

## 2024-02-01 DIAGNOSIS — E11.69 HYPERLIPIDEMIA ASSOCIATED WITH TYPE 2 DIABETES MELLITUS: ICD-10-CM

## 2024-02-01 DIAGNOSIS — D69.6 THROMBOCYTOPENIA: ICD-10-CM

## 2024-02-01 DIAGNOSIS — Z01.30 BP CHECK: Primary | ICD-10-CM

## 2024-02-01 DIAGNOSIS — E78.5 HYPERLIPIDEMIA ASSOCIATED WITH TYPE 2 DIABETES MELLITUS: ICD-10-CM

## 2024-02-01 LAB
ALBUMIN SERPL BCP-MCNC: 4 G/DL (ref 3.5–5.2)
ALP SERPL-CCNC: 53 U/L (ref 55–135)
ALT SERPL W/O P-5'-P-CCNC: 17 U/L (ref 10–44)
ANION GAP SERPL CALC-SCNC: 9 MMOL/L (ref 8–16)
AST SERPL-CCNC: 20 U/L (ref 10–40)
BASOPHILS # BLD AUTO: 0.04 K/UL (ref 0–0.2)
BASOPHILS NFR BLD: 0.9 % (ref 0–1.9)
BILIRUB SERPL-MCNC: 0.6 MG/DL (ref 0.1–1)
BUN SERPL-MCNC: 18 MG/DL (ref 8–23)
CALCIUM SERPL-MCNC: 10.6 MG/DL (ref 8.7–10.5)
CHLORIDE SERPL-SCNC: 104 MMOL/L (ref 95–110)
CHOLEST SERPL-MCNC: 135 MG/DL (ref 120–199)
CHOLEST/HDLC SERPL: 2.8 {RATIO} (ref 2–5)
CO2 SERPL-SCNC: 30 MMOL/L (ref 23–29)
CREAT SERPL-MCNC: 0.7 MG/DL (ref 0.5–1.4)
DIFFERENTIAL METHOD BLD: ABNORMAL
EOSINOPHIL # BLD AUTO: 0.1 K/UL (ref 0–0.5)
EOSINOPHIL NFR BLD: 2.8 % (ref 0–8)
ERYTHROCYTE [DISTWIDTH] IN BLOOD BY AUTOMATED COUNT: 14 % (ref 11.5–14.5)
EST. GFR  (NO RACE VARIABLE): >60 ML/MIN/1.73 M^2
ESTIMATED AVG GLUCOSE: 134 MG/DL (ref 68–131)
GLUCOSE SERPL-MCNC: 139 MG/DL (ref 70–110)
HBA1C MFR BLD: 6.3 % (ref 4–5.6)
HCT VFR BLD AUTO: 42.1 % (ref 40–54)
HDLC SERPL-MCNC: 49 MG/DL (ref 40–75)
HDLC SERPL: 36.3 % (ref 20–50)
HGB BLD-MCNC: 13.9 G/DL (ref 14–18)
IMM GRANULOCYTES # BLD AUTO: 0.01 K/UL (ref 0–0.04)
IMM GRANULOCYTES NFR BLD AUTO: 0.2 % (ref 0–0.5)
LDLC SERPL CALC-MCNC: 72 MG/DL (ref 63–159)
LYMPHOCYTES # BLD AUTO: 1 K/UL (ref 1–4.8)
LYMPHOCYTES NFR BLD: 20.6 % (ref 18–48)
MCH RBC QN AUTO: 29.1 PG (ref 27–31)
MCHC RBC AUTO-ENTMCNC: 33 G/DL (ref 32–36)
MCV RBC AUTO: 88 FL (ref 82–98)
MONOCYTES # BLD AUTO: 0.6 K/UL (ref 0.3–1)
MONOCYTES NFR BLD: 12.6 % (ref 4–15)
NEUTROPHILS # BLD AUTO: 2.9 K/UL (ref 1.8–7.7)
NEUTROPHILS NFR BLD: 62.9 % (ref 38–73)
NONHDLC SERPL-MCNC: 86 MG/DL
NRBC BLD-RTO: 0 /100 WBC
PLATELET # BLD AUTO: 152 K/UL (ref 150–450)
PMV BLD AUTO: 13 FL (ref 9.2–12.9)
POTASSIUM SERPL-SCNC: 4.6 MMOL/L (ref 3.5–5.1)
PROT SERPL-MCNC: 7.2 G/DL (ref 6–8.4)
RBC # BLD AUTO: 4.78 M/UL (ref 4.6–6.2)
SODIUM SERPL-SCNC: 143 MMOL/L (ref 136–145)
TRIGL SERPL-MCNC: 70 MG/DL (ref 30–150)
WBC # BLD AUTO: 4.62 K/UL (ref 3.9–12.7)

## 2024-02-01 PROCEDURE — 99212 OFFICE O/P EST SF 10 MIN: CPT | Mod: PBBFAC,PO

## 2024-02-01 PROCEDURE — 99999 PR PBB SHADOW E&M-EST. PATIENT-LVL II: CPT | Mod: PBBFAC,,,

## 2024-02-01 PROCEDURE — 83036 HEMOGLOBIN GLYCOSYLATED A1C: CPT | Performed by: FAMILY MEDICINE

## 2024-02-01 PROCEDURE — 36415 COLL VENOUS BLD VENIPUNCTURE: CPT | Mod: PO | Performed by: FAMILY MEDICINE

## 2024-02-01 PROCEDURE — 80061 LIPID PANEL: CPT | Performed by: FAMILY MEDICINE

## 2024-02-01 PROCEDURE — 80053 COMPREHEN METABOLIC PANEL: CPT | Performed by: FAMILY MEDICINE

## 2024-02-01 PROCEDURE — 85025 COMPLETE CBC W/AUTO DIFF WBC: CPT | Performed by: FAMILY MEDICINE

## 2024-02-01 NOTE — PROGRESS NOTES
Pt in clinic for BP check. /86 HR 67. Pt asymptomatic. Pt sat in clinic 5 minutes prior to rechecking BP. Manual /78. PCP notified by Epic message.

## 2024-02-08 ENCOUNTER — TELEPHONE (OUTPATIENT)
Dept: FAMILY MEDICINE | Facility: CLINIC | Age: 69
End: 2024-02-08
Payer: MEDICARE

## 2024-02-08 DIAGNOSIS — E11.9 TYPE 2 DIABETES MELLITUS WITHOUT COMPLICATION, WITHOUT LONG-TERM CURRENT USE OF INSULIN: Primary | ICD-10-CM

## 2024-02-08 NOTE — TELEPHONE ENCOUNTER
----- Message from Go Raza MD sent at 2/2/2024 12:08 PM CST -----  Sugar looks good.  Calcium was minimally elevated cholesterol looks good.  Otherwise lab stable.  Recommend repeat hemoglobin A1c, B12, BMP end of July. My nurse will contact you to arrange.  Thanks,  Dr. Raza

## 2024-02-11 NOTE — TELEPHONE ENCOUNTER
No care due was identified.  Health [facility] Embedded Care Due Messages. Reference number: [id number].   2/11/2024 7:17:51 AM CST

## 2024-02-12 RX ORDER — METFORMIN HYDROCHLORIDE 1000 MG/1
1000 TABLET ORAL
Qty: 90 TABLET | Refills: 3 | Status: SHIPPED | OUTPATIENT
Start: 2024-02-12

## 2024-02-12 NOTE — TELEPHONE ENCOUNTER
Refill Routing Note   Medication(s) are not appropriate for processing by [facility] for the following reason(s):        New or recently adjusted medication    [facility] action(s):  Defer               Appointments  past 12m or future 3m with PCP    Date Provider   Last Visit   1/29/2024 Go Raza MD   Next Visit   Visit date not found Go Raza MD   ED visits in past 90 days: 0        Note composed:6:38 AM 02/12/2024

## 2024-02-27 DIAGNOSIS — Z00.00 ENCOUNTER FOR MEDICARE ANNUAL WELLNESS EXAM: ICD-10-CM

## 2024-03-05 ENCOUNTER — TELEPHONE (OUTPATIENT)
Dept: UROLOGY | Facility: CLINIC | Age: 69
End: 2024-03-05
Payer: MEDICARE

## 2024-03-06 ENCOUNTER — OFFICE VISIT (OUTPATIENT)
Dept: UROLOGY | Facility: CLINIC | Age: 69
End: 2024-03-06
Payer: MEDICARE

## 2024-03-06 VITALS — BODY MASS INDEX: 27.01 KG/M2 | HEIGHT: 70 IN | WEIGHT: 188.69 LBS

## 2024-03-06 DIAGNOSIS — C61 PROSTATE CANCER: Primary | ICD-10-CM

## 2024-03-06 LAB
BILIRUBIN, UA POC OHS: NEGATIVE
BLOOD, UA POC OHS: NEGATIVE
CLARITY, UA POC OHS: CLEAR
COLOR, UA POC OHS: YELLOW
GLUCOSE, UA POC OHS: NEGATIVE
KETONES, UA POC OHS: NEGATIVE
LEUKOCYTES, UA POC OHS: NEGATIVE
NITRITE, UA POC OHS: NEGATIVE
PH, UA POC OHS: 7
PROTEIN, UA POC OHS: NEGATIVE
SPECIFIC GRAVITY, UA POC OHS: 1.02
UROBILINOGEN, UA POC OHS: 0.2

## 2024-03-06 PROCEDURE — 99999PBSHW POCT URINALYSIS(INSTRUMENT): Mod: PBBFAC,,,

## 2024-03-06 PROCEDURE — 99213 OFFICE O/P EST LOW 20 MIN: CPT | Mod: PBBFAC,PO | Performed by: UROLOGY

## 2024-03-06 PROCEDURE — G2211 COMPLEX E/M VISIT ADD ON: HCPCS | Mod: S$PBB,,, | Performed by: UROLOGY

## 2024-03-06 PROCEDURE — 81003 URINALYSIS AUTO W/O SCOPE: CPT | Mod: PBBFAC,PO | Performed by: UROLOGY

## 2024-03-06 PROCEDURE — 99999 PR PBB SHADOW E&M-EST. PATIENT-LVL III: CPT | Mod: PBBFAC,,, | Performed by: UROLOGY

## 2024-03-06 PROCEDURE — 99213 OFFICE O/P EST LOW 20 MIN: CPT | Mod: S$PBB,,, | Performed by: UROLOGY

## 2024-03-06 NOTE — PROGRESS NOTES
Subjective:       Patient ID: Merrill Cummings is a 69 y.o. male.    Chief Complaint: Follow-up    HPI    69-year-old with prostate cancer diagnosed in December 2021.  Group clinical stage IIIA. cT1c cN0 cM0, [place] score 4+3=7.  PSA = 21.   Patient's father and grandfather had prostate cancer.   He was treated by Dr. Puckett with a full course of radiation therapy and completed in June of 2022.  He is overall doing well.  He has no bothersome urinary symptoms.  Plan was 18 months of androgen deprivation.  He received his 1st Lupron injection in February of 2022.  Hot flashes have now nearly resolved.  Urine dipstick shows negative for all components.     PSA Diagnostic   Latest Ref Rng 0.00 - 4.00 ng/mL   11/8/2021 21.0 (H)    7/8/2022 0.02    11/28/2022 <0.01    6/5/2023 <0.01    1/19/2024 <0.01         Review of Systems   Constitutional:  Negative for fever.   Genitourinary:  Negative for dysuria and hematuria.       Objective:      Physical Exam    Assessment:       1. Prostate cancer        Plan:       Prostate cancer  -     POCT Urinalysis(Instrument)  -     Prostate Specific Antigen, Diagnostic; Future; Expected date: 09/06/2024      Visit today included increased complexity associated with the care of prostate cancer addressed and managing the longitudinal care of the patient due to the serious and/or complex managed problem(s)  prostate cancer.     Follow-up 6 months with repeat PSA

## 2024-03-14 NOTE — PROGRESS NOTES
UROLOGY [place]  2 9 22    Cc cancer talk    Age 67, accompanied by wife. Pt has been followed for elevated psa for a number of years. It was 9.8 in oct 2018, 11.8 in July 2019, and 21 in nov 2021.     Father had prostate cancer, but did not die of that.   He also had lung cancer and did die of that.    Has had several prostate biopsies, having had one in apr 2017 that was negative, then an MDX diagnostic test in oct 2017 that showed a 57% risk of prostate cancer (26% low tavo and 31% tavo 7 or higher); then had another needle biopsy in apr 2018 that was negative, showing some areas of tissue atrophy.     During clinic visits, at no time was any irregularity or nodule detected during rectal examination.    He then had an MRI of the prostate in nov 2021:    MRI PROSTATE 11 23 21  The prostate volume is estimated at 46 cc. Both peripheral zones are diffusely hypo intense and demonstrate restricted diffusion. The pattern is characteristic for chronic prostatitis. One highly concerning lesion 15 millimeter x 9 millimeter x 12 millimeter in size was identified in the anterior left transitional zone and the anterior left fibromuscular stroma from the base to the apex. Multiparametric characteristics are highly concerning for transitional zone malignancy   (PI-RADS 4/5). No lymphadenopathy seen to the included lymph nodes.    In dec 2021 he had a targeted prostate biopsy that finally was positive. It showed tavo 7 (3+4), with 15% tissue involvement in the area identified as a specific lesion. The routine sextant biopsies on the R side did not show malignancy. Of the L side sextants, the base was also negative, the mid sextant showed tavo 7 (4+3) adenocarcinoma of prostate with 10% involvement, and the apex tavo 7 (3+4) adenocarcinoma with 15% involvement.     We then did a PSMA pet scan that is not reported in [facility], and was done at [facility]. It was negative for metastatic disease.      Dr edgardo aguilar then counseled pt on prostate cancer treatment and recommended external beam radiotherapy with 1.5 years of androgen deprivation. Pt is agreeable to this approach. Pt is here to receive his first injection of Lupron 45 mg.     //IMP adenocarcinoma of prostate cT1c cN0 cM0 [place] score 4+3=7.  PSA = 21. Negative metastatic w/u. Nonpalpable rectally.     Given lupron 45mg, lot [id number], exp 9 feb 2024, R gluteal area, tolerated well. Will let dr aguilar know that he received his shot, so radiotherapy can be planned.     PLANNED ANDROGEN DEPRIVATION:  - feb 2022 (today) Lupron 45 im given  - aug 2022 Lupron 45 PENDING  - feb 2022 Lupron 45 PENDING    RTC 6 months       Principal Discharge DX:	Hemorrhagic shock   1

## 2024-04-02 NOTE — TELEPHONE ENCOUNTER
No care due was identified.  [facility] Embedded Care Due Messages. Reference number: [id number].   4/02/2024 12:33:23 PM CDT

## 2024-04-03 ENCOUNTER — PATIENT MESSAGE (OUTPATIENT)
Dept: FAMILY MEDICINE | Facility: CLINIC | Age: 69
End: 2024-04-03
Payer: MEDICARE

## 2024-04-03 RX ORDER — CARVEDILOL 12.5 MG/1
12.5 TABLET ORAL 2 TIMES DAILY
Qty: 180 TABLET | Refills: 3 | Status: SHIPPED | OUTPATIENT
Start: 2024-04-03

## 2024-04-03 RX ORDER — GLUCOSAM/CHON-MSM1/C/MANG/BOSW 500-416.6
TABLET ORAL
Qty: 100 EACH | Refills: 3 | Status: SHIPPED | OUTPATIENT
Start: 2024-04-03

## 2024-04-03 NOTE — TELEPHONE ENCOUNTER
No care due was identified.  Health [facility] Embedded Care Due Messages. Reference number: [id number].   4/03/2024 3:18:14 PM CDT

## 2024-04-03 NOTE — TELEPHONE ENCOUNTER
Refill Decision Note   Merrill Cummings  is requesting a refill authorization.  Brief Assessment and Rationale for Refill:  Approve     Medication Therapy Plan:         Comments:     Note composed:2:53 PM 04/03/2024

## 2024-04-14 ENCOUNTER — PATIENT MESSAGE (OUTPATIENT)
Dept: FAMILY MEDICINE | Facility: CLINIC | Age: 69
End: 2024-04-14
Payer: MEDICARE

## 2024-04-15 RX ORDER — LOSARTAN POTASSIUM 100 MG/1
100 TABLET ORAL NIGHTLY
Qty: 90 TABLET | Refills: 0 | Status: SHIPPED | OUTPATIENT
Start: 2024-04-15

## 2024-04-15 NOTE — TELEPHONE ENCOUNTER
No care due was identified.  [facility] Embedded Care Due Messages. Reference number: [id number].   4/15/2024 8:21:13 AM CDT

## 2024-05-14 LAB
LEFT EYE DM RETINOPATHY: NEGATIVE
RIGHT EYE DM RETINOPATHY: NEGATIVE

## 2024-05-29 ENCOUNTER — PATIENT OUTREACH (OUTPATIENT)
Dept: ADMINISTRATIVE | Facility: HOSPITAL | Age: 69
End: 2024-05-29
Payer: MEDICARE

## 2024-06-24 ENCOUNTER — OFFICE VISIT (OUTPATIENT)
Dept: FAMILY MEDICINE | Facility: CLINIC | Age: 69
End: 2024-06-24
Payer: MEDICARE

## 2024-06-24 VITALS
BODY MASS INDEX: 27.92 KG/M2 | SYSTOLIC BLOOD PRESSURE: 138 MMHG | RESPIRATION RATE: 18 BRPM | HEART RATE: 76 BPM | TEMPERATURE: 98 F | HEIGHT: 70 IN | DIASTOLIC BLOOD PRESSURE: 78 MMHG | WEIGHT: 195 LBS

## 2024-06-24 DIAGNOSIS — N41.0 ACUTE PROSTATITIS: ICD-10-CM

## 2024-06-24 DIAGNOSIS — R30.0 DYSURIA: ICD-10-CM

## 2024-06-24 LAB
BACTERIA #/AREA URNS HPF: ABNORMAL /HPF
BILIRUB UR QL STRIP: NEGATIVE
CLARITY UR: CLEAR
COLOR UR: ABNORMAL
GLUCOSE UR QL STRIP: NEGATIVE
HGB UR QL STRIP: ABNORMAL
KETONES UR QL STRIP: NEGATIVE
LEUKOCYTE ESTERASE UR QL STRIP: ABNORMAL
MICROSCOPIC COMMENT: ABNORMAL
NITRITE UR QL STRIP: NEGATIVE
PH UR STRIP: 8 [PH] (ref 5–8)
PROT UR QL STRIP: NEGATIVE
RBC #/AREA URNS HPF: 63 /HPF (ref 0–4)
SP GR UR STRIP: 1.01 (ref 1–1.03)
URN SPEC COLLECT METH UR: ABNORMAL
WBC #/AREA URNS HPF: 16 /HPF (ref 0–5)
WBC CLUMPS URNS QL MICRO: ABNORMAL

## 2024-06-24 PROCEDURE — 87086 URINE CULTURE/COLONY COUNT: CPT | Performed by: NURSE PRACTITIONER

## 2024-06-24 PROCEDURE — 87186 SC STD MICRODIL/AGAR DIL: CPT | Performed by: NURSE PRACTITIONER

## 2024-06-24 PROCEDURE — 87088 URINE BACTERIA CULTURE: CPT | Performed by: NURSE PRACTITIONER

## 2024-06-24 PROCEDURE — 87077 CULTURE AEROBIC IDENTIFY: CPT | Performed by: NURSE PRACTITIONER

## 2024-06-24 PROCEDURE — 99214 OFFICE O/P EST MOD 30 MIN: CPT | Mod: PBBFAC,PO | Performed by: NURSE PRACTITIONER

## 2024-06-24 PROCEDURE — 99213 OFFICE O/P EST LOW 20 MIN: CPT | Mod: S$PBB,,, | Performed by: NURSE PRACTITIONER

## 2024-06-24 PROCEDURE — 81000 URINALYSIS NONAUTO W/SCOPE: CPT | Mod: PO | Performed by: NURSE PRACTITIONER

## 2024-06-24 PROCEDURE — 99999 PR PBB SHADOW E&M-EST. PATIENT-LVL IV: CPT | Mod: PBBFAC,,, | Performed by: NURSE PRACTITIONER

## 2024-06-24 RX ORDER — SILDENAFIL 50 MG/1
50 TABLET, FILM COATED ORAL DAILY
COMMUNITY
Start: 2024-06-10

## 2024-06-24 RX ORDER — CIPROFLOXACIN 500 MG/1
500 TABLET ORAL 2 TIMES DAILY
Qty: 28 TABLET | Refills: 0 | Status: SHIPPED | OUTPATIENT
Start: 2024-06-24 | End: 2024-07-08

## 2024-06-24 RX ORDER — PHENAZOPYRIDINE HYDROCHLORIDE 200 MG/1
200 TABLET, FILM COATED ORAL 3 TIMES DAILY PRN
Qty: 12 TABLET | Refills: 0 | Status: SHIPPED | OUTPATIENT
Start: 2024-06-24 | End: 2024-07-04

## 2024-06-24 NOTE — PROGRESS NOTES
Subjective:       Patient ID: Merrill Cummings is a 69 y.o. male.    Chief Complaint: Hematuria    Hematuria  This is a new problem. The current episode started yesterday. The problem has been waxing and waning since onset. He describes the hematuria as gross hematuria. The hematuria occurs throughout his entire urinary stream. He reports no clotting in his urine stream. The pain is mild. Irritative symptoms include urgency. Irritative symptoms do not include frequency. Associated symptoms include dysuria, genital pain and hesitancy. Pertinent negatives include no abdominal pain, fever, flank pain, nausea, vomiting or sore throat. He is sexually active. His past medical history is significant for kidney stones.       Review of Systems   Constitutional:  Negative for fatigue, fever and unexpected weight change.   HENT:  Negative for ear pain and sore throat.    Eyes: Negative.  Negative for pain and visual disturbance.   Respiratory:  Negative for cough and shortness of breath.    Cardiovascular:  Negative for chest pain and palpitations.   Gastrointestinal:  Negative for abdominal pain, diarrhea, nausea and vomiting.   Genitourinary:  Positive for difficulty urinating, dysuria, hematuria, hesitancy and urgency. Negative for flank pain and frequency.   Musculoskeletal:  Negative for arthralgias and myalgias.   Skin:  Negative for color change and rash.   Neurological:  Negative for dizziness and headaches.   Psychiatric/Behavioral:  Negative for sleep disturbance. The patient is not nervous/anxious.        Vitals:    06/24/24 1719   BP: 138/78   Pulse:    Resp:    Temp:        Objective:     Current Outpatient Medications   Medication Sig Dispense Refill    carvediloL (COREG) 12.5 MG tablet TAKE 1 TABLET BY MOUTH TWICE A  tablet 3    coenzyme Q10 100 mg capsule Take 100 mg by mouth once daily.      FLAXSEED ORAL Take by mouth. Flaxseed Ground-2 tablespoons daily      hydroCHLOROthiazide (HYDRODIURIL) 25 MG  tablet Take 25 mg by mouth.      losartan (COZAAR) 100 MG tablet Take 1 tablet (100 mg total) by mouth nightly. 90 tablet 0    metFORMIN (GLUCOPHAGE) 1000 MG tablet Take 1 tablet (1,000 mg total) by mouth daily with breakfast. 90 tablet 3    rosuvastatin (CRESTOR) 20 MG tablet Take 1 tablet (20 mg total) by mouth once daily. 90 tablet 3    sildenafiL (VIAGRA) 50 MG tablet Take 50 mg by mouth once daily.      TRUE METRIX GLUCOSE TEST STRIP Strp USE TO CHECK BLOOD GLUCONSE ONCE DAILY 100 strip 3    TRUEPLUS LANCETS 30 gauge Misc Use to check blood sugar once daily 100 each 3    ciprofloxacin HCl (CIPRO) 500 MG tablet Take 1 tablet (500 mg total) by mouth 2 (two) times daily. for 14 days 28 tablet 0    phenazopyridine (PYRIDIUM) 200 MG tablet Take 1 tablet (200 mg total) by mouth 3 (three) times daily as needed for Pain. 12 tablet 0     No current facility-administered medications for this visit.       Physical Exam  Vitals and nursing note reviewed.   Constitutional:       General: He is not in acute distress.     Appearance: He is well-developed.   HENT:      Head: Normocephalic and atraumatic.   Eyes:      Pupils: Pupils are equal, round, and reactive to light.   Cardiovascular:      Rate and Rhythm: Normal rate and regular rhythm.   Pulmonary:      Effort: Pulmonary effort is normal.      Breath sounds: Normal breath sounds.   Musculoskeletal:         General: Normal range of motion.      Cervical back: Normal range of motion and neck supple.   Skin:     General: Skin is warm and dry.      Findings: No rash.   Neurological:      Mental Status: He is alert and oriented to person, place, and time.   Psychiatric:         Thought Content: Thought content normal.         Lab Results   Component Value Date    COLORU Straw 06/24/2024    APPEARANCEUA Clear 06/24/2024    GLUCUA Negative 06/24/2024    SPECGRAV 1.010 06/24/2024    PHUR 8.0 06/24/2024    WBCUR Negative 03/06/2024    RBCUR neg 08/22/2022    NITRITE Negative  06/24/2024    KETONESU Negative 06/24/2024    BILIRUBINUA Negative 06/24/2024    UROBILINOGEN 0.2 03/06/2024    OCCULTUA 3+ (A) 06/24/2024    LEUKOCYTESUR 3+ (A) 06/24/2024    GLUCOSEUR Negative 03/06/2024       Lab Results   Component Value Date    RBCUA 63 (H) 06/24/2024    WBCUA 16 (H) 06/24/2024    BACTERIA Rare 06/24/2024    MICROCMT SEE COMMENT 06/24/2024       Assessment:       1. Acute prostatitis    2. Dysuria        Plan:   Acute prostatitis    Dysuria  -     Cancel: Urinalysis, Reflex to Urine Culture Urine, Clean Catch; Future; Expected date: 06/24/2024  -     Urinalysis, Reflex to Urine Culture Urine, Clean Catch    Other orders  -     Urinalysis Microscopic  -     Urine culture  -     ciprofloxacin HCl (CIPRO) 500 MG tablet; Take 1 tablet (500 mg total) by mouth 2 (two) times daily. for 14 days  Dispense: 28 tablet; Refill: 0  -     phenazopyridine (PYRIDIUM) 200 MG tablet; Take 1 tablet (200 mg total) by mouth 3 (three) times daily as needed for Pain.  Dispense: 12 tablet; Refill: 0        Follow up if symptoms worsen or fail to improve, for Routine scheduled appointment.    Patient Instructions   Elmer Momin,     If you are due for any health screening(s) below please notify me so we can arrange them to be ordered and scheduled. Most healthy patients at your age complete them, but you are free to accept or refuse.     If you can't do it, I'll definitely understand. If you can, I'd certainly appreciate it!    All of your core healthy metrics are met.      Lets manage your high blood pressure     Your blood pressure was above 140/90 today during your visit. We recommend that you schedule a nurse visit in two weeks to check your blood pressure and discuss ways to support your health goals.     You can also manage your health and record your blood pressure from the comfort of home by keeping a daily blood pressure log. These results are shared with and reviewed by your provider. Please print this form  (Daily Blood Pressure Log) to assist you in keeping track of your blood pressure at home.     Schedule your nurse visit in two weeks to learn more about how to track and manage high blood pressure.    Daily Blood Pressure Log    Name:__________________________________                  Date of Birth:_________    Average Blood Pressure:  __________      Date: Time  (a.m.) Blood  Pressure: Pulse  Rate: Time  (p.m.) Blood  Pressure : Pulse  Rate:   Sample 8:37 127/83 84

## 2024-06-24 NOTE — PATIENT INSTRUCTIONS
Elmer Momin,     If you are due for any health screening(s) below please notify me so we can arrange them to be ordered and scheduled. Most healthy patients at your age complete them, but you are free to accept or refuse.     If you can't do it, I'll definitely understand. If you can, I'd certainly appreciate it!    All of your core healthy metrics are met.      Lets manage your high blood pressure     Your blood pressure was above 140/90 today during your visit. We recommend that you schedule a nurse visit in two weeks to check your blood pressure and discuss ways to support your health goals.     You can also manage your health and record your blood pressure from the comfort of home by keeping a daily blood pressure log. These results are shared with and reviewed by your provider. Please print this form (Daily Blood Pressure Log) to assist you in keeping track of your blood pressure at home.     Schedule your nurse visit in two weeks to learn more about how to track and manage high blood pressure.    Daily Blood Pressure Log    Name:__________________________________                  Date of Birth:_________    Average Blood Pressure:  __________      Date: Time  (a.m.) Blood  Pressure: Pulse  Rate: Time  (p.m.) Blood  Pressure : Pulse  Rate:   Sample 8:37 127/83 84

## 2024-06-26 LAB — BACTERIA UR CULT: ABNORMAL

## 2024-07-10 RX ORDER — LOSARTAN POTASSIUM 100 MG/1
100 TABLET ORAL NIGHTLY
Qty: 90 TABLET | Refills: 1 | Status: SHIPPED | OUTPATIENT
Start: 2024-07-10

## 2024-07-10 NOTE — TELEPHONE ENCOUNTER
Refill Decision Note   Merrill Zoila  is requesting a refill authorization.  Brief Assessment and Rationale for Refill:  Approve     Medication Therapy Plan:  FLOS 7.17.2024      Comments:     Note composed:10:47 AM 07/10/2024

## 2024-07-10 NOTE — TELEPHONE ENCOUNTER
Care Due:                  Date            Visit Type   Department     Provider  --------------------------------------------------------------------------------                                MYCHART                              FOLLOWUP/OF  [facility] FAMILY  Last Visit: 01-      FICE VISIT   MEDICINE       Go Raza  Next Visit: None Scheduled  None         None Found                                                            Last  Test          Frequency    Reason                     Performed    Due Date  --------------------------------------------------------------------------------    HBA1C.......  6 months...  metFORMIN................  02- 07-    [facility] Embedded Care Due Messages. Reference number: [id number].   7/10/2024 10:39:14 AM CDT

## 2024-07-17 ENCOUNTER — LAB VISIT (OUTPATIENT)
Dept: LAB | Facility: HOSPITAL | Age: 69
End: 2024-07-17
Attending: FAMILY MEDICINE
Payer: MEDICARE

## 2024-07-17 DIAGNOSIS — Z79.899 ENCOUNTER FOR LONG-TERM (CURRENT) USE OF MEDICATIONS: ICD-10-CM

## 2024-07-17 DIAGNOSIS — E11.9 TYPE 2 DIABETES MELLITUS WITHOUT COMPLICATION, WITHOUT LONG-TERM CURRENT USE OF INSULIN: ICD-10-CM

## 2024-07-17 LAB
ANION GAP SERPL CALC-SCNC: 10 MMOL/L (ref 8–16)
BUN SERPL-MCNC: 14 MG/DL (ref 8–23)
CALCIUM SERPL-MCNC: 9.7 MG/DL (ref 8.7–10.5)
CHLORIDE SERPL-SCNC: 106 MMOL/L (ref 95–110)
CO2 SERPL-SCNC: 25 MMOL/L (ref 23–29)
CREAT SERPL-MCNC: 0.7 MG/DL (ref 0.5–1.4)
EST. GFR  (NO RACE VARIABLE): >60 ML/MIN/1.73 M^2
ESTIMATED AVG GLUCOSE: 148 MG/DL (ref 68–131)
GLUCOSE SERPL-MCNC: 158 MG/DL (ref 70–110)
HBA1C MFR BLD: 6.8 % (ref 4–5.6)
POTASSIUM SERPL-SCNC: 3.5 MMOL/L (ref 3.5–5.1)
SODIUM SERPL-SCNC: 141 MMOL/L (ref 136–145)
VIT B12 SERPL-MCNC: 515 PG/ML (ref 210–950)

## 2024-07-17 PROCEDURE — 82607 VITAMIN B-12: CPT | Performed by: FAMILY MEDICINE

## 2024-07-17 PROCEDURE — 83036 HEMOGLOBIN GLYCOSYLATED A1C: CPT | Performed by: FAMILY MEDICINE

## 2024-07-17 PROCEDURE — 80048 BASIC METABOLIC PNL TOTAL CA: CPT | Performed by: FAMILY MEDICINE

## 2024-07-17 PROCEDURE — 36415 COLL VENOUS BLD VENIPUNCTURE: CPT | Mod: PO | Performed by: FAMILY MEDICINE

## 2024-08-06 ENCOUNTER — PATIENT MESSAGE (OUTPATIENT)
Dept: FAMILY MEDICINE | Facility: CLINIC | Age: 69
End: 2024-08-06
Payer: MEDICARE

## 2024-08-06 ENCOUNTER — TELEPHONE (OUTPATIENT)
Dept: FAMILY MEDICINE | Facility: CLINIC | Age: 69
End: 2024-08-06
Payer: MEDICARE

## 2024-08-06 DIAGNOSIS — E11.9 TYPE 2 DIABETES MELLITUS WITHOUT COMPLICATION, WITHOUT LONG-TERM CURRENT USE OF INSULIN: Primary | ICD-10-CM

## 2024-08-20 ENCOUNTER — PATIENT MESSAGE (OUTPATIENT)
Dept: ADMINISTRATIVE | Facility: CLINIC | Age: 69
End: 2024-08-20
Payer: MEDICARE

## 2024-08-30 ENCOUNTER — TELEPHONE (OUTPATIENT)
Dept: HEMATOLOGY/ONCOLOGY | Facility: CLINIC | Age: 69
End: 2024-08-30
Payer: MEDICARE

## 2024-08-30 NOTE — TELEPHONE ENCOUNTER
Pt requesting PSA level check. Per Dr. Sigala pt will need to follow up with Dr. Barr to follow PSA. Pt v/u

## 2024-08-30 NOTE — TELEPHONE ENCOUNTER
----- Message from Marah Groves MA sent at 2024 10:31 AM CDT -----  Appointment Request  Merrill Cummings  Sent:   8:56 AM  To: [facility] Hematology Oncology Clinical Support Staff       Merrill Cummings  MRN: [id number] : 1955  Pt Home: [phone]    Entered: [phone]      Message    Appointment Request From: Merrill Cummings    With Provider: Bg Sigala [[facility] - Hematology Oncology]    Preferred Date Range: Any    Preferred Times: Any Time    Reason for visit: I have not had a PSA test since 24. This seems overdue since I recently had prostate cancer. Please advise. Thank you.    Comments:  PSA test.   No

## 2024-09-03 ENCOUNTER — LAB VISIT (OUTPATIENT)
Dept: LAB | Facility: HOSPITAL | Age: 69
End: 2024-09-03
Payer: MEDICARE

## 2024-09-03 DIAGNOSIS — C61 PROSTATE CANCER: ICD-10-CM

## 2024-09-03 LAB — COMPLEXED PSA SERPL-MCNC: 0.13 NG/ML (ref 0–4)

## 2024-09-03 PROCEDURE — 84153 ASSAY OF PSA TOTAL: CPT | Performed by: UROLOGY

## 2024-09-03 PROCEDURE — 36415 COLL VENOUS BLD VENIPUNCTURE: CPT | Mod: PO | Performed by: UROLOGY

## 2024-10-14 ENCOUNTER — OFFICE VISIT (OUTPATIENT)
Dept: UROLOGY | Facility: CLINIC | Age: 69
End: 2024-10-14
Payer: MEDICARE

## 2024-10-14 VITALS — HEIGHT: 70 IN | WEIGHT: 201.94 LBS | BODY MASS INDEX: 28.91 KG/M2

## 2024-10-14 DIAGNOSIS — C61 PROSTATE CANCER: Primary | ICD-10-CM

## 2024-10-14 PROCEDURE — 99213 OFFICE O/P EST LOW 20 MIN: CPT | Mod: PBBFAC,PO | Performed by: UROLOGY

## 2024-10-14 PROCEDURE — 81003 URINALYSIS AUTO W/O SCOPE: CPT | Mod: PBBFAC,PO | Performed by: UROLOGY

## 2024-10-14 PROCEDURE — G2211 COMPLEX E/M VISIT ADD ON: HCPCS | Mod: S$PBB,,, | Performed by: UROLOGY

## 2024-10-14 PROCEDURE — 99999 PR PBB SHADOW E&M-EST. PATIENT-LVL III: CPT | Mod: PBBFAC,,, | Performed by: UROLOGY

## 2024-10-14 PROCEDURE — 99213 OFFICE O/P EST LOW 20 MIN: CPT | Mod: S$PBB,,, | Performed by: UROLOGY

## 2024-10-14 PROCEDURE — 99999PBSHW POCT URINALYSIS(INSTRUMENT): Mod: PBBFAC,,,

## 2024-10-14 RX ORDER — MULTIVITAMIN
1 TABLET ORAL DAILY
COMMUNITY

## 2024-10-14 RX ORDER — LANCETS 33 GAUGE
EACH MISCELLANEOUS
COMMUNITY
Start: 2024-07-26

## 2024-10-14 NOTE — PROGRESS NOTES
Subjective:       Patient ID: Merrill Cummings is a 69 y.o. male.    Chief Complaint: Follow-up    HPI    69-year-old with prostate cancer diagnosed in December 2021.  Group clinical stage IIIA. cT1c cN0 cM0, [place] score 4+3=7.  PSA = 21.   Patient's father and grandfather had prostate cancer.   He was treated by Dr. Puckett with a full course of radiation therapy and completed in June of 2022.  He is overall doing well.  He has no bothersome urinary symptoms.  Some urgency but no nocturia.   He received his 1st Lupron injection in February of 2022.  His most recent PSA is 0.13.  Urine dipstick shows negative for all components.       PSA Diagnostic   Latest Ref Rng 0.00 - 4.00 ng/mL   11/8/2021 21.0 (H)    7/8/2022 0.02    11/28/2022 <0.01    6/5/2023 <0.01    1/19/2024 <0.01    9/3/2024 0.13       Review of Systems   Constitutional:  Negative for fever.   Genitourinary:  Negative for dysuria and hematuria.       Objective:      Physical Exam  Vitals reviewed.   Constitutional:       Appearance: He is well-developed.   Pulmonary:      Effort: Pulmonary effort is normal.   Skin:     Findings: No rash.   Neurological:      Mental Status: He is alert and oriented to person, place, and time.         Assessment:       1. Prostate cancer        Plan:       Prostate cancer  -     POCT Urinalysis(Instrument)  -     Prostate Specific Antigen, Diagnostic; Standing     PSA every 3 months.  F/u 6 months    Visit today included increased complexity associated with the care of prostate cancer and managing the longitudinal care of the patient due to the serious and/or complex managed problem(s) of prostate cancer.

## 2024-10-21 ENCOUNTER — PATIENT MESSAGE (OUTPATIENT)
Dept: ADMINISTRATIVE | Facility: HOSPITAL | Age: 69
End: 2024-10-21
Payer: MEDICARE

## 2024-10-31 ENCOUNTER — HOSPITAL ENCOUNTER (OUTPATIENT)
Dept: RADIOLOGY | Facility: HOSPITAL | Age: 69
Discharge: HOME | End: 2024-10-31
Attending: NURSE PRACTITIONER
Payer: MEDICARE

## 2024-10-31 ENCOUNTER — OFFICE VISIT (OUTPATIENT)
Dept: FAMILY MEDICINE | Facility: CLINIC | Age: 69
End: 2024-10-31
Payer: MEDICARE

## 2024-10-31 VITALS
WEIGHT: 196 LBS | SYSTOLIC BLOOD PRESSURE: 144 MMHG | OXYGEN SATURATION: 96 % | DIASTOLIC BLOOD PRESSURE: 80 MMHG | HEIGHT: 69 IN | BODY MASS INDEX: 29.03 KG/M2 | TEMPERATURE: 98 F | HEART RATE: 89 BPM

## 2024-10-31 DIAGNOSIS — R06.2 WHEEZING: ICD-10-CM

## 2024-10-31 DIAGNOSIS — J06.9 UPPER RESPIRATORY TRACT INFECTION, UNSPECIFIED TYPE: Primary | ICD-10-CM

## 2024-10-31 DIAGNOSIS — R05.9 COUGH, UNSPECIFIED TYPE: ICD-10-CM

## 2024-10-31 LAB
CTP QC/QA: YES
CTP QC/QA: YES
POC MOLECULAR INFLUENZA A AGN: NEGATIVE
POC MOLECULAR INFLUENZA B AGN: NEGATIVE
SARS-COV-2 RDRP RESP QL NAA+PROBE: NEGATIVE

## 2024-10-31 PROCEDURE — 99999PBSHW POCT INFLUENZA A/B MOLECULAR: Mod: PBBFAC,,,

## 2024-10-31 PROCEDURE — 99213 OFFICE O/P EST LOW 20 MIN: CPT | Mod: S$PBB,,, | Performed by: NURSE PRACTITIONER

## 2024-10-31 PROCEDURE — 99999 PR PBB SHADOW E&M-EST. PATIENT-LVL V: CPT | Mod: PBBFAC,,, | Performed by: NURSE PRACTITIONER

## 2024-10-31 PROCEDURE — 87635 SARS-COV-2 COVID-19 AMP PRB: CPT | Mod: PBBFAC,PO | Performed by: NURSE PRACTITIONER

## 2024-10-31 PROCEDURE — 99999PBSHW: Mod: PBBFAC,,,

## 2024-10-31 PROCEDURE — 99215 OFFICE O/P EST HI 40 MIN: CPT | Mod: PBBFAC,25,PO | Performed by: NURSE PRACTITIONER

## 2024-10-31 PROCEDURE — 71046 X-RAY EXAM CHEST 2 VIEWS: CPT | Mod: TC,PO

## 2024-10-31 PROCEDURE — 71046 X-RAY EXAM CHEST 2 VIEWS: CPT | Mod: 26,,, | Performed by: RADIOLOGY

## 2024-10-31 PROCEDURE — 87502 INFLUENZA DNA AMP PROBE: CPT | Mod: PBBFAC,PO | Performed by: NURSE PRACTITIONER

## 2024-10-31 RX ORDER — CEFDINIR 300 MG/1
300 CAPSULE ORAL 2 TIMES DAILY
Qty: 20 CAPSULE | Refills: 0 | Status: SHIPPED | OUTPATIENT
Start: 2024-10-31 | End: 2024-11-10

## 2024-10-31 RX ORDER — ALBUTEROL SULFATE 90 UG/1
2 INHALANT RESPIRATORY (INHALATION) EVERY 6 HOURS PRN
Qty: 18 G | Refills: 0 | Status: SHIPPED | OUTPATIENT
Start: 2024-10-31

## 2024-10-31 RX ORDER — PROMETHAZINE HYDROCHLORIDE AND DEXTROMETHORPHAN HYDROBROMIDE 6.25; 15 MG/5ML; MG/5ML
5 SYRUP ORAL 2 TIMES DAILY PRN
Qty: 118 ML | Refills: 0 | Status: SHIPPED | OUTPATIENT
Start: 2024-10-31 | End: 2024-11-10

## 2024-11-04 ENCOUNTER — DOCUMENTATION ONLY (OUTPATIENT)
Dept: ADMINISTRATIVE | Facility: OTHER | Age: 69
End: 2024-11-04
Payer: MEDICARE

## 2024-11-05 NOTE — PROGRESS NOTES
2024      Anil Barr M.D.   [address] [place] [address]      RE: Merrill Cummings    MR#:  [id number]    :  1955       DX:   1. High-risk adenocarcinoma of the prostate diagnosed in 2021.  Group clinical   stage IIIA.  cT1c cN0 cM0.  [place] score 4+3=7.  PSA =21.  The patient's father and grandfather had prostate cancer.        2.       The patient was thought to have Charcot Mague Tooth muscular dystrophy, although work   up in  puts that diagnosis in doubt.         Dear Herber:     Your patient returns two years and five months after completing pelvic irradiation delivered with 1.5 years of androgen deprivation therapy.  In 2023, PSA was less than 0.1.  In January of this year, PSA was less than 0.01.  On September 3 of this year, PSA was 0.13.     On , the patient saw you in follow up.  Your note stated that you would be getting PSAs every three months, and you will see him in follow up in six months.     The patient is doing well symptomatically.  He has good bowel and bladder function.     Physical exam shows a man in no distress.  He continues to have a good performance status.  Digital rectal examination is not performed.     Assessment and Plan:  Symptomatically, he is doing well.  I have told the patient that frequently the PSA will increase slightly after completing androgen deprivation therapy.  The patient will be getting PSAs every three months under your direction.  You are scheduled to see the patient in follow up on  of next year.  I have asked see him in two years or sooner if the need should arise.     I appreciate the opportunity to consult on your patients.  Please call me with any question or comments.     Sincerely,          Electronically Signed By:  HUMERA Slaughter/Rae  DD:  2024  DT:  2024  Job #:  [id number]    CC:  Genaro Snow M.D., [address], [place] [address], Fax:  [phone]        Go Raza M.D., [address] [facility] [place] [address], Fax: [phone]        Dustin Thompson MD, [address] [facility] [place] [address], Fax: [phone]        Anil Barr M.D., [address], [place] [address], Fax: [phone]

## 2024-11-22 RX ORDER — ALBUTEROL SULFATE 90 UG/1
2 INHALANT RESPIRATORY (INHALATION) EVERY 6 HOURS PRN
Qty: 18 G | Refills: 0 | Status: SHIPPED | OUTPATIENT
Start: 2024-11-22

## 2024-12-05 ENCOUNTER — LAB VISIT (OUTPATIENT)
Dept: LAB | Facility: HOSPITAL | Age: 69
End: 2024-12-05
Payer: MEDICARE

## 2024-12-05 DIAGNOSIS — C61 PROSTATE CANCER: ICD-10-CM

## 2024-12-05 LAB — COMPLEXED PSA SERPL-MCNC: 0.22 NG/ML (ref 0–4)

## 2024-12-05 PROCEDURE — 84153 ASSAY OF PSA TOTAL: CPT | Performed by: UROLOGY

## 2024-12-05 PROCEDURE — 36415 COLL VENOUS BLD VENIPUNCTURE: CPT | Mod: PO | Performed by: UROLOGY

## 2025-01-11 NOTE — TELEPHONE ENCOUNTER
Care Due:                  Date            Visit Type   Department     Provider  --------------------------------------------------------------------------------                                MYCHART                              FOLLOWUP/OF  [facility] FAMILY  Last Visit: 01-      FICE VISIT   MEDICINE       Go Raza  Next Visit: None Scheduled  None         None Found                                                            Last  Test          Frequency    Reason                     Performed    Due Date  --------------------------------------------------------------------------------    CMP.........  12 months..  rosuvastatin.............  02- 01-    HBA1C.......  6 months...  metFORMIN................  07- 01-    Lipid Panel.  12 months..  rosuvastatin.............  02- 01-    [facility] Embedded Care Due Messages. Reference number: [id number].   1/11/2025 7:38:02 AM CST

## 2025-01-11 NOTE — TELEPHONE ENCOUNTER
Refill Routing Note   Medication(s) are not appropriate for processing by [facility] for the following reason(s):        Required vitals abnormal    [facility] action(s):  Defer               Appointments  past 12m or future 3m with PCP    Date Provider   Last Visit   1/29/2024 Go Raza MD   Next Visit   Visit date not found Go Raza MD   ED visits in past 90 days: 0        Note composed:11:34 AM 01/11/2025

## 2025-01-13 RX ORDER — LOSARTAN POTASSIUM 100 MG/1
100 TABLET ORAL NIGHTLY
Qty: 90 TABLET | Refills: 0 | Status: SHIPPED | OUTPATIENT
Start: 2025-01-13

## 2025-01-14 ENCOUNTER — PATIENT OUTREACH (OUTPATIENT)
Dept: ADMINISTRATIVE | Facility: HOSPITAL | Age: 70
End: 2025-01-14
Payer: MEDICARE

## 2025-01-14 DIAGNOSIS — Z00.00 ENCOUNTER FOR MEDICARE ANNUAL WELLNESS EXAM: ICD-10-CM

## 2025-01-14 NOTE — PROGRESS NOTES
VBC OUTREACH: per chart review pt is overdue for HA1C, CMP, LIPID, MICROALBUMIN, already scheduled 1.16.25

## 2025-01-15 RX ORDER — CARVEDILOL 12.5 MG/1
12.5 TABLET ORAL 2 TIMES DAILY
Qty: 180 TABLET | Refills: 0 | Status: SHIPPED | OUTPATIENT
Start: 2025-01-15

## 2025-01-15 NOTE — TELEPHONE ENCOUNTER
Refill Routing Note   Medication(s) are not appropriate for processing by [facility] for the following reason(s):        Required vitals abnormal    [facility] action(s):  Defer               Appointments  past 12m or future 3m with PCP    Date Provider   Last Visit   1/29/2024 Go Raza MD   Next Visit   2/14/2025 Go Raza MD   ED visits in past 90 days: 0        Note composed:4:06 AM 01/15/2025

## 2025-01-15 NOTE — TELEPHONE ENCOUNTER
Unable to retrieve patient chart and identify care due.  [facility] Embedded Care Due Messages. Reference number: [id number].   1/15/2025 12:29:13 AM CST

## 2025-01-16 ENCOUNTER — LAB VISIT (OUTPATIENT)
Dept: LAB | Facility: HOSPITAL | Age: 70
End: 2025-01-16
Attending: FAMILY MEDICINE
Payer: MEDICARE

## 2025-01-16 DIAGNOSIS — E11.9 TYPE 2 DIABETES MELLITUS WITHOUT COMPLICATION, WITHOUT LONG-TERM CURRENT USE OF INSULIN: ICD-10-CM

## 2025-01-16 LAB
ALBUMIN SERPL BCP-MCNC: 4.3 G/DL (ref 3.5–5.2)
ALP SERPL-CCNC: 60 U/L (ref 40–150)
ALT SERPL W/O P-5'-P-CCNC: 27 U/L (ref 10–44)
ANION GAP SERPL CALC-SCNC: 9 MMOL/L (ref 8–16)
AST SERPL-CCNC: 27 U/L (ref 10–40)
BILIRUB SERPL-MCNC: 0.6 MG/DL (ref 0.1–1)
BUN SERPL-MCNC: 15 MG/DL (ref 8–23)
CALCIUM SERPL-MCNC: 10.2 MG/DL (ref 8.7–10.5)
CHLORIDE SERPL-SCNC: 103 MMOL/L (ref 95–110)
CHOLEST SERPL-MCNC: 144 MG/DL (ref 120–199)
CHOLEST/HDLC SERPL: 3.6 {RATIO} (ref 2–5)
CO2 SERPL-SCNC: 29 MMOL/L (ref 23–29)
CREAT SERPL-MCNC: 0.7 MG/DL (ref 0.5–1.4)
EST. GFR  (NO RACE VARIABLE): >60 ML/MIN/1.73 M^2
ESTIMATED AVG GLUCOSE: 154 MG/DL (ref 68–131)
GLUCOSE SERPL-MCNC: 146 MG/DL (ref 70–110)
HBA1C MFR BLD: 7 % (ref 4–5.6)
HDLC SERPL-MCNC: 40 MG/DL (ref 40–75)
HDLC SERPL: 27.8 % (ref 20–50)
LDLC SERPL CALC-MCNC: 79.6 MG/DL (ref 63–159)
NONHDLC SERPL-MCNC: 104 MG/DL
POTASSIUM SERPL-SCNC: 4.4 MMOL/L (ref 3.5–5.1)
PROT SERPL-MCNC: 7.8 G/DL (ref 6–8.4)
SODIUM SERPL-SCNC: 141 MMOL/L (ref 136–145)
TRIGL SERPL-MCNC: 122 MG/DL (ref 30–150)

## 2025-01-16 PROCEDURE — 80053 COMPREHEN METABOLIC PANEL: CPT | Performed by: FAMILY MEDICINE

## 2025-01-16 PROCEDURE — 80061 LIPID PANEL: CPT | Performed by: FAMILY MEDICINE

## 2025-01-16 PROCEDURE — 83036 HEMOGLOBIN GLYCOSYLATED A1C: CPT | Performed by: FAMILY MEDICINE

## 2025-01-22 RX ORDER — ROSUVASTATIN CALCIUM 20 MG/1
20 TABLET, COATED ORAL
Qty: 90 TABLET | Refills: 0 | Status: SHIPPED | OUTPATIENT
Start: 2025-01-22

## 2025-01-22 NOTE — TELEPHONE ENCOUNTER
No care due was identified.  [facility] Embedded Care Due Messages. Reference number: [id number].   1/22/2025 12:27:36 AM CST

## 2025-01-22 NOTE — TELEPHONE ENCOUNTER
Refill Decision Note   Merrill Zoila  is requesting a refill authorization.  Brief Assessment and Rationale for Refill:  Approve     Medication Therapy Plan:         Comments:     Note composed:3:43 AM 01/22/2025

## 2025-02-07 ENCOUNTER — CLINICAL SUPPORT (OUTPATIENT)
Dept: AUDIOLOGY | Facility: CLINIC | Age: 70
End: 2025-02-07
Payer: MEDICARE

## 2025-02-07 DIAGNOSIS — Z01.10 ENCOUNTER FOR HEARING EXAMINATION, UNSPECIFIED WHETHER ABNORMAL FINDINGS: Primary | ICD-10-CM

## 2025-02-07 NOTE — PROGRESS NOTES
The patient was seen in the audiology clinic. He had been scheduled for a hearing evaluation.    An otoscopic screening revealed excessive cerumen in the right ear. There were no ENT providers available for cerumen removal. The patient scheduled a return visit for cerumen removal by Denisse Louis NP and a hearing evaluation on the same day.

## 2025-02-10 ENCOUNTER — PROCEDURE VISIT (OUTPATIENT)
Dept: OTOLARYNGOLOGY | Facility: CLINIC | Age: 70
End: 2025-02-10
Payer: MEDICARE

## 2025-02-10 ENCOUNTER — CLINICAL SUPPORT (OUTPATIENT)
Dept: AUDIOLOGY | Facility: CLINIC | Age: 70
End: 2025-02-10
Payer: MEDICARE

## 2025-02-10 VITALS — BODY MASS INDEX: 29.55 KG/M2 | WEIGHT: 199.5 LBS | HEIGHT: 69 IN

## 2025-02-10 DIAGNOSIS — H93.13 TINNITUS, BILATERAL: Primary | ICD-10-CM

## 2025-02-10 DIAGNOSIS — H90.3 BILATERAL HIGH FREQUENCY SENSORINEURAL HEARING LOSS: ICD-10-CM

## 2025-02-10 DIAGNOSIS — H61.21 RIGHT EAR IMPACTED CERUMEN: Primary | ICD-10-CM

## 2025-02-10 PROCEDURE — 92557 COMPREHENSIVE HEARING TEST: CPT | Mod: PBBFAC,PO | Performed by: AUDIOLOGIST-HEARING AID FITTER

## 2025-02-10 PROCEDURE — 99999 PR PBB SHADOW E&M-EST. PATIENT-LVL I: CPT | Mod: PBBFAC,,, | Performed by: AUDIOLOGIST-HEARING AID FITTER

## 2025-02-10 PROCEDURE — 99211 OFF/OP EST MAY X REQ PHY/QHP: CPT | Mod: PBBFAC,PO | Performed by: AUDIOLOGIST-HEARING AID FITTER

## 2025-02-10 PROCEDURE — 92567 TYMPANOMETRY: CPT | Mod: PBBFAC,PO | Performed by: AUDIOLOGIST-HEARING AID FITTER

## 2025-02-10 PROCEDURE — 69210 REMOVE IMPACTED EAR WAX UNI: CPT | Mod: PBBFAC,PO | Performed by: NURSE PRACTITIONER

## 2025-02-10 PROCEDURE — 99499 UNLISTED E&M SERVICE: CPT | Mod: S$PBB,,, | Performed by: NURSE PRACTITIONER

## 2025-02-10 PROCEDURE — G0268 REMOVAL OF IMPACTED WAX MD: HCPCS | Mod: S$PBB,,, | Performed by: NURSE PRACTITIONER

## 2025-02-10 RX ORDER — BEMPEDOIC ACID AND EZETIMIBE 180; 10 MG/1; MG/1
1 TABLET, FILM COATED ORAL
COMMUNITY
Start: 2025-01-31 | End: 2025-02-14

## 2025-02-10 NOTE — PROCEDURES
Ear Cerumen Removal    Date/Time: 2/10/2025 8:45 AM    Performed by: Denisse Louis NP  Authorized by: Denisse Louis NP    Consent Done?:  Not Needed    Local anesthetic:  None  Location details:  Right ear  Procedure type: curette    Procedure type comment:  Suction, alligator forceps  Cerumen  Removal Results:  Cerumen completely removed  Patient tolerance:  Patient tolerated the procedure well with no immediate complications     Patient worked for [employer], wore hearing protection. 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 Constant tinnitus AU. Has appt next week to inquire about hearing aids at [facility].

## 2025-02-10 NOTE — PROGRESS NOTES
Merrill Cummings was seen on 02/10/2025 for an audiological evaluation. Pt was alone during today's visit. Pertinent complaints today include hearing loss and tinnitus AU. Pt confirms history of loud noise exposure and denies early onset of genetic family history of hearing loss.      Results reveal a bilateral normal through 2K Hz sloping to severe HF sensorineural hearing loss.    Speech Reception Thresholds were  5 dBHL for the right ear and 5 dBHL for the left ear.    Word recognition scores were good for the right ear and good for the left ear.   Tympanograms were Type A for the right ear and Type A for the left ear.    Audiogram results were reviewed in detail with patient and all questions were answered. Results will be reviewed by the referring provider at the completion of this note. All complaints were addressed during this visit to the patient's satisfaction. Recommend binaural amplification pending medical clearance, repeat hearing testing in one year due to tinnitus and noise exposure and bilateral hearing protection with either muffs or in-ear protection in loud noises. Plan of care was discussed in detail with the patient, who agreed with the plan as above. He will go to [facility] for hearing aids.

## 2025-02-14 ENCOUNTER — OFFICE VISIT (OUTPATIENT)
Dept: FAMILY MEDICINE | Facility: CLINIC | Age: 70
End: 2025-02-14
Payer: MEDICARE

## 2025-02-14 VITALS
DIASTOLIC BLOOD PRESSURE: 88 MMHG | WEIGHT: 195.31 LBS | HEIGHT: 69 IN | HEART RATE: 76 BPM | TEMPERATURE: 98 F | SYSTOLIC BLOOD PRESSURE: 135 MMHG | BODY MASS INDEX: 28.93 KG/M2

## 2025-02-14 DIAGNOSIS — I15.2 HYPERTENSION ASSOCIATED WITH DIABETES: ICD-10-CM

## 2025-02-14 DIAGNOSIS — E78.5 HYPERLIPIDEMIA ASSOCIATED WITH TYPE 2 DIABETES MELLITUS: ICD-10-CM

## 2025-02-14 DIAGNOSIS — E11.59 HYPERTENSION ASSOCIATED WITH DIABETES: ICD-10-CM

## 2025-02-14 DIAGNOSIS — E11.40 TYPE 2 DIABETES MELLITUS WITH DIABETIC NEUROPATHY, WITHOUT LONG-TERM CURRENT USE OF INSULIN: Primary | ICD-10-CM

## 2025-02-14 DIAGNOSIS — C61 PROSTATE CANCER: ICD-10-CM

## 2025-02-14 DIAGNOSIS — I70.0 CALCIFICATION OF AORTA: ICD-10-CM

## 2025-02-14 DIAGNOSIS — E11.69 HYPERLIPIDEMIA ASSOCIATED WITH TYPE 2 DIABETES MELLITUS: ICD-10-CM

## 2025-02-14 DIAGNOSIS — G70.9: ICD-10-CM

## 2025-02-14 PROCEDURE — 99999 PR PBB SHADOW E&M-EST. PATIENT-LVL V: CPT | Mod: PBBFAC,,, | Performed by: FAMILY MEDICINE

## 2025-02-14 PROCEDURE — 99215 OFFICE O/P EST HI 40 MIN: CPT | Mod: PBBFAC,PO | Performed by: FAMILY MEDICINE

## 2025-02-14 NOTE — PROGRESS NOTES
"Follow-up diabetes.  Recent laboratory stable.  Blood pressure elevated today, states usually better at home 130s over 80s...  Lipids  on statin..  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  Could be related to diabetes.  Uses ankle-foot orthotic .  He was diagnosed with prostate cancer treated with radiation and antiandrogen treatment.  .  Aortic calcification asymptomatic.        Merrill Spring" was seen today for annual exam.    Diagnoses and all orders for this visit:    Type 2 diabetes mellitus with diabetic neuropathy, without long-term current use of insulin    Hypertension associated with diabetes  -     Hypertension Digital Medicine (HDMP) Enrollment Order  -     Hemoglobin A1C; Future  -     MYC E-VISIT    Hyperlipidemia associated with type 2 diabetes mellitus    Prostate cancer    Neuromyopathy syndrome    Calcification of aorta      Reviewed recent laboratory stable conditions controlled.  Repeat hemoglobin A1c in July.  Continue current medications.  E visit for blood pressure in 2 weeks.  Continue follow-up with Oncology, Urology    I spent a total of 43 minutes on the day of the visit.  This includes face to face time and non-face to face time preparing to see the patient (eg, review of tests), obtaining and/or reviewing separately obtained history, documenting clinical information in the electronic or other health record, independently interpreting results and communicating results to the patient/family/caregiver, or care coordinator.          Diabetes Management Status    Statin: Taking  ACE/ARB: Taking    Screening or Prevention Patient's value Goal Complete/Controlled?   HgA1C Testing and Control   Lab Results   Component Value Date    HGBA1C 7.0 (H) 01/16/2025      Annually/Less than 8% Yes   Lipid profile : 01/16/2025 Annually Yes   LDL " "control Lab Results   Component Value Date    LDLCALC 79.6 01/16/2025    Annually/Less than 100 mg/dl  Yes   Nephropathy screening Lab Results   Component Value Date    LABMICR 30.0 01/16/2025     Lab Results   Component Value Date    PROTEINUA Negative 06/24/2024     No results found for: "UTPCR"   Annually Yes   Blood pressure BP Readings from Last 1 Encounters:   02/14/25 135/88    Less than 140/90 Yes   Dilated retinal exam : 05/14/2024 Annually Yes   Foot exam   : 02/14/2025 Annually Yes             Past Medical History:  Past Medical History:   Diagnosis Date    Calculus of gallbladder without cholecystitis without obstruction 12/20/2022    Charcot Mague Tooth muscular atrophy 2007    His father had it. Pt started with dropped foot and poor pectoral muscles. able to walk well.     Colon polyp     Diabetes mellitus, type 2     Elevated PSA     Foot drop, bilateral     Gait abnormality     Gallbladder polyp 1/5/2015    Gout     right ankle    H. pylori infection 02/01/2018    had EGD, undergoing treatment    HEARING LOSS     Hyperlipidemia     Hypertension     Kidney stone     Muscular dystrophy 2012    diagnosed neurology [facility] -wears leg braces prn    MVP (mitral valve prolapse)     LAQUITA (obstructive sleep apnea)     resolved with weight loss- no longer requires cpap    Prostate cancer 2/28/2022    Tubular adenoma of colon     Urolithiasis      Past Surgical History:   Procedure Laterality Date    ANKLE FRACTURE SURGERY      CIRCUMCISION      COLONOSCOPY  ~2004 or 05  ([place]?  [place]?)    "Nothing"    COLONOSCOPY N/A 10/2/2020    Procedure: COLONOSCOPY;  Surgeon: Vernon Houser Jr., MD;  Location: [facility];  Service: Endoscopy;  Laterality: N/A;    COLONOSCOPY W/ POLYPECTOMY  04/15/2015    [facility].   Two 1 to 3 mm polyps in the proximal ascending colon.  TUBULAR ADENOMA.   Internal hemorrhoids.  Otherwise normalcolon and TI.    ESOPHAGOGASTRODUODENOSCOPY      EXTRACORPOREAL SHOCK WAVE LITHOTRIPSY  2002, " 2004    x 2 - dr chivo joshi ([facility])    FACIAL LACERATIONS REPAIR      as a child    MULTIPLE TOOTH EXTRACTIONS      ORIF ankle fracture  1988    PROSTATE BIOPSY      X2     Review of patient's allergies indicates:  No Known Allergies    Current Outpatient Medications on File Prior to Visit   Medication Sig Dispense Refill    albuterol (PROVENTIL/VENTOLIN HFA) 90 mcg/actuation inhaler TAKE 2 PUFFS BY MOUTH EVERY 6 HOURS AS NEEDED FOR WHEEZE OR SHORTNESS OF BREATH 18 g 0    BENFOTIAMINE ORAL Take 300 capsules by mouth Daily.      carvediloL (COREG) 12.5 MG tablet TAKE 1 TABLET BY MOUTH TWICE A  tablet 0    coenzyme Q10 100 mg capsule Take 100 mg by mouth once daily.      FLAXSEED ORAL Take by mouth. Flaxseed Ground-2 tablespoons daily      hydroCHLOROthiazide (HYDRODIURIL) 25 MG tablet Take 25 mg by mouth.      losartan (COZAAR) 100 MG tablet TAKE 1 TABLET BY MOUTH EVERY DAY AT NIGHT 90 tablet 0    metFORMIN (GLUCOPHAGE) 1000 MG tablet Take 1 tablet (1,000 mg total) by mouth daily with breakfast. 90 tablet 3    multivitamin (THERAGRAN) per tablet Take 1 tablet by mouth once daily.      rosuvastatin (CRESTOR) 20 MG tablet TAKE 1 TABLET BY MOUTH EVERY DAY 90 tablet 0    sildenafiL (VIAGRA) 50 MG tablet Take 50 mg by mouth once daily.      [DISCONTINUED] NEXLIZET 180-10 mg Tab Take 1 tablet by mouth.      TRUE METRIX GLUCOSE TEST STRIP Strp USE TO CHECK BLOOD GLUCONSE ONCE DAILY 100 strip 3    TRUEPLUS LANCETS 33 gauge Misc USE TO CHECK BLOOD SUGAR ONCE DAILY       No current facility-administered medications on file prior to visit.     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     Comment: [employer]    Occupation: avid bicycle rider, rides twice a day     Comment: 10 miles a day    Tobacco Use    Smoking status: Never    Smokeless tobacco: Never   Substance and Sexual Activity     Alcohol use: No    Drug use: No    Sexual activity: Never     Social Drivers of Health     Financial Resource Strain: Low Risk  (2025)    Overall Financial Resource Strain (CARDIA)     Difficulty of Paying Living Expenses: Not hard at all   Food Insecurity: No Food Insecurity (2025)    Hunger Vital Sign     Worried About Running Out of Food in the Last Year: Never true     Ran Out of Food in the Last Year: Never true   Transportation Needs: No Transportation Needs (2024)    PRAPARE - Transportation     Lack of Transportation (Medical): No     Lack of Transportation (Non-Medical): No   Physical Activity: Inactive (2025)    Exercise Vital Sign     Days of Exercise per Week: 0 days     Minutes of Exercise per Session: 0 min   Stress: No Stress Concern Present (2025)    [employer] [place] [employer] - Occupational Stress Questionnaire     Feeling of Stress : Not at all   Housing Stability: Low Risk  (2024)    Housing Stability Vital Sign     Unable to Pay for Housing in the Last Year: No     Number of Places Lived in the Last Year: 1     Unstable Housing in the Last Year: No     Family History   Problem Relation Name Age of Onset    Diabetes Father      Hypertension Father      Muscular dystrophy Father      Prostate cancer Father          did no0t die of it    Lung cancer Father          prostate, did not die of/lung,  of    Glaucoma Father      Macular degeneration Father      Hypertension Mother      Glaucoma Mother      Macular degeneration Mother      Scoliosis Sister      Stroke Sister          massive - SUDDEN DEATH    Pulmonary embolism Paternal Grandfather      Prostate cancer Paternal Grandfather             ROS:  GENERAL: No fever, chills,  or significant weight changes.   CARDIOVASCULAR: Denies chest pain, PND, orthopnea or reduced exercise tolerance.  ABDOMEN: Appetite fine. Denies diarrhea, abdominal pain, hematemesis or blood in stool.  URINARY: No flank pain,  "dysuria or hematuria.      OBJECTIVE:   Vitals:    02/14/25 1055 02/14/25 1138 02/14/25 1148   BP: (!) 151/96 (!) 150/94 135/88   Pulse: 76     Temp: 97.5 °F (36.4 °C)     TempSrc: Temporal     Weight: 88.6 kg (195 lb 4.8 oz)     Height: 5' 9" (1.753 m)       Wt Readings from Last 3 Encounters:   02/14/25 88.6 kg (195 lb 4.8 oz)   02/10/25 90.5 kg (199 lb 8.3 oz)   10/31/24 88.9 kg (196 lb)       APPEARANCE: Well nourished, well developed, in no acute distress.   HEAD: Normocephalic. Atraumatic. No sinus tenderness.   EYES:   Right eye: Pupil reactive. Conjunctiva clear.   Left eye: Pupil reactive. Conjunctiva clear.   Both fundi: Grossly normal to nondilated exam. EOMI.   EARS: TM's intact. Light reflex normal. No retraction or perforation.   NOSE: clear.   MOUTH & THROAT: No pharyngeal erythema or exudate. No lesions.   NECK: No bruits. No JVD. No cervical lymphadenopathy. No thyromegaly.   CHEST: Breath sounds clear bilaterally. Normal respiratory effort   CARDIOVASCULAR: Normal rate. Regular rhythm. No murmurs. No rub. No gallops.   ABDOMEN: Bowel sounds normal. Soft. No tenderness. No organomegaly.   PERIPHERAL VASCULAR: No cyanosis. No clubbing. No edema.   NEUROLOGIC:  Cranial nerves intact.  Bilateral foot drop  Feet:Sensation in the feet mildly decreased to monofilament testing especially at the heels more so on the right.  Some hammertoes and nail dystrophy noted  No significant foot lesions.Pulses palpable.  MENTAL STATUS: Alert. Oriented x 3.             "

## 2025-02-17 ENCOUNTER — PATIENT MESSAGE (OUTPATIENT)
Dept: ADMINISTRATIVE | Facility: HOSPITAL | Age: 70
End: 2025-02-17
Payer: MEDICARE

## 2025-02-18 ENCOUNTER — PATIENT OUTREACH (OUTPATIENT)
Dept: ADMINISTRATIVE | Facility: HOSPITAL | Age: 70
End: 2025-02-18
Payer: MEDICARE

## 2025-02-18 NOTE — PROGRESS NOTES
Replying to Campaign Questionnaire for Overdue HM:     Spoke with patient.  He will have Diabetic Eye Exam performed at [facility] in [place], [place].  A reminder has been set for 05.12.2025 to request the records.  Please note to call him and let him know that it has been done.      [facility]  Dr. Jesus Brennan  Phone:  [phone]  Fax:      [phone]

## 2025-02-28 ENCOUNTER — PATIENT MESSAGE (OUTPATIENT)
Dept: FAMILY MEDICINE | Facility: CLINIC | Age: 70
End: 2025-02-28
Payer: MEDICARE

## 2025-02-28 ENCOUNTER — E-VISIT (OUTPATIENT)
Dept: FAMILY MEDICINE | Facility: CLINIC | Age: 70
End: 2025-02-28
Payer: MEDICARE

## 2025-02-28 DIAGNOSIS — E11.59 HYPERTENSION ASSOCIATED WITH DIABETES: Primary | ICD-10-CM

## 2025-02-28 DIAGNOSIS — I15.2 HYPERTENSION ASSOCIATED WITH DIABETES: Primary | ICD-10-CM

## 2025-02-28 PROCEDURE — 99421 OL DIG E/M SVC 5-10 MIN: CPT | Mod: ,,, | Performed by: FAMILY MEDICINE

## 2025-03-12 VITALS — DIASTOLIC BLOOD PRESSURE: 82 MMHG | SYSTOLIC BLOOD PRESSURE: 136 MMHG

## 2025-03-12 NOTE — PROGRESS NOTES
Patient ID: Merrill Cummings is a 70 y.o. male.    Chief Complaint: Hypertension (Entered automatically based on patient selection in [employer].)    The patient initiated a request through [employer] on 2/28/2025 for evaluation and management with a chief complaint of Hypertension (Entered automatically based on patient selection in [employer].)     I evaluated the questionnaire submission on 3/12/24.    [facility] Peq Evisit Hypertension    3/11/2025  6:07 PM CDT - Filed by Patient   Do you agree to participate in an E-Visit? Yes   If you have any of the following symptoms, please do not complete an E-Visit. Instead, schedule an appointment with your provider I acknowledge   Choose the state of your primary residence [place]   What would you like addressed about your blood pressure? New concern   What is the main issue you would like addressed today? Elevated blood pressure   Your blood pressure is a: Recurring problem   When were you first diagnosed with high blood pressure? More than 1 year ago   Since you first learned you had high blood pressure, how has it changed? Always present but gets better and worse   How would you classify your blood pressure? Well controlled   Are you having any of the following symptoms from your high blood pressure? None of the above   Are you taking any of the following medications? None of these   The following factors can make high blood pressure worse or harder to control. Which of them might be contributing to your high blood pressure?  Excess weight;  Lack of exercise   Have you taken blood pressure medications in the past that caused you problems or side effects? No   Are you currently taking medication(s) for your blood pressure? Yes   Have you recently started a new medication or changed your dose? No   How often are you taking your medication per week?  Every day   Have you had any side effects from your current blood pressure medication? Yes   What are the side effects? Other   Are  you able to take your blood pressure? Yes   Please give your most recent blood pressure readings    Reading 1 136/82   Reading 2 129/71   Reading 3 125/73   Reading 4 130/75   Reading 5 141/82   If you are able to take your pulse, please provide it below. 79   Provide any additional information you feel is important. Hydrochlothizide causes frequent urination   Please attach any relevant images or files    Are you able to take any other vitals? Yes   Weight: 190   Height: 69   Temperature:    Respiration rate:    Pulse Oxygen:          Encounter Diagnosis   Name Primary?    Hypertension associated with diabetes Yes        No orders of the defined types were placed in this encounter.       Hello,   The majority of your blood pressure readings look good.  I would just recommend continuing current medication unless you are having too much issue with the urination.  In that case we would probably need to change some of the other medication.  Thanks,   Dr. Raza    No follow-ups on file.      E-Visit Time Tracking:    Day 1 Time (in minutes): 5    Total Time (in minutes): 5

## 2025-03-21 ENCOUNTER — PATIENT MESSAGE (OUTPATIENT)
Dept: UROLOGY | Facility: CLINIC | Age: 70
End: 2025-03-21
Payer: MEDICARE

## 2025-03-23 ENCOUNTER — PATIENT MESSAGE (OUTPATIENT)
Dept: UROLOGY | Facility: CLINIC | Age: 70
End: 2025-03-23
Payer: MEDICARE

## 2025-03-25 ENCOUNTER — LAB VISIT (OUTPATIENT)
Dept: LAB | Facility: HOSPITAL | Age: 70
End: 2025-03-25
Attending: UROLOGY
Payer: MEDICARE

## 2025-03-25 DIAGNOSIS — C61 PROSTATE CANCER: ICD-10-CM

## 2025-03-25 PROCEDURE — 36415 COLL VENOUS BLD VENIPUNCTURE: CPT | Mod: PO

## 2025-03-25 PROCEDURE — 84153 ASSAY OF PSA TOTAL: CPT

## 2025-03-26 LAB — PSA SERPL-MCNC: 0.18 NG/ML

## 2025-04-10 RX ORDER — LOSARTAN POTASSIUM 100 MG/1
100 TABLET ORAL NIGHTLY
Qty: 90 TABLET | Refills: 3 | Status: SHIPPED | OUTPATIENT
Start: 2025-04-10

## 2025-04-10 NOTE — TELEPHONE ENCOUNTER
No care due was identified.  [facility] Embedded Care Due Messages. Reference number: [id number].   4/10/2025 12:29:56 AM CDT

## 2025-04-10 NOTE — TELEPHONE ENCOUNTER
Refill Decision Note   Merrill Cummings  is requesting a refill authorization.  Brief Assessment and Rationale for Refill:  Approve     Medication Therapy Plan:         Comments:     Note composed:5:41 AM 04/10/2025

## 2025-04-12 NOTE — TELEPHONE ENCOUNTER
No care due was identified.  [facility] Embedded Care Due Messages. Reference number: [id number].   4/12/2025 7:15:12 AM CDT

## 2025-04-13 RX ORDER — CARVEDILOL 12.5 MG/1
12.5 TABLET ORAL 2 TIMES DAILY
Qty: 180 TABLET | Refills: 3 | Status: SHIPPED | OUTPATIENT
Start: 2025-04-13

## 2025-04-13 NOTE — TELEPHONE ENCOUNTER
Refill Decision Note   Merrill Cummings  is requesting a refill authorization.  Brief Assessment and Rationale for Refill:  Approve     Medication Therapy Plan:         Comments:     Note composed:11:11 AM 04/13/2025             Appointments     Last Visit   2/28/2025 Go Raza MD   Next Visit   Visit date not found Go Raza MD

## 2025-04-17 ENCOUNTER — OFFICE VISIT (OUTPATIENT)
Dept: UROLOGY | Facility: CLINIC | Age: 70
End: 2025-04-17
Payer: MEDICARE

## 2025-04-17 VITALS — BODY MASS INDEX: 29.19 KG/M2 | HEIGHT: 69 IN | WEIGHT: 197.06 LBS

## 2025-04-17 DIAGNOSIS — C61 PROSTATE CANCER: Primary | ICD-10-CM

## 2025-04-17 LAB
BILIRUBIN, UA POC OHS: NEGATIVE
BLOOD, UA POC OHS: ABNORMAL
CLARITY, UA POC OHS: CLEAR
COLOR, UA POC OHS: YELLOW
GLUCOSE, UA POC OHS: NEGATIVE
KETONES, UA POC OHS: NEGATIVE
LEUKOCYTES, UA POC OHS: NEGATIVE
NITRITE, UA POC OHS: NEGATIVE
PH, UA POC OHS: 7
PROTEIN, UA POC OHS: NEGATIVE
SPECIFIC GRAVITY, UA POC OHS: 1.02
UROBILINOGEN, UA POC OHS: 0.2

## 2025-04-17 PROCEDURE — G2211 COMPLEX E/M VISIT ADD ON: HCPCS | Mod: S$PBB,,, | Performed by: UROLOGY

## 2025-04-17 PROCEDURE — 99212 OFFICE O/P EST SF 10 MIN: CPT | Mod: PBBFAC,PO | Performed by: UROLOGY

## 2025-04-17 PROCEDURE — 99999PBSHW POCT URINALYSIS(INSTRUMENT): Mod: PBBFAC,,,

## 2025-04-17 PROCEDURE — 99214 OFFICE O/P EST MOD 30 MIN: CPT | Mod: S$PBB,,, | Performed by: UROLOGY

## 2025-04-17 PROCEDURE — 99999 PR PBB SHADOW E&M-EST. PATIENT-LVL II: CPT | Mod: PBBFAC,,, | Performed by: UROLOGY

## 2025-04-17 PROCEDURE — 81003 URINALYSIS AUTO W/O SCOPE: CPT | Mod: PBBFAC,PO | Performed by: UROLOGY

## 2025-04-17 NOTE — PROGRESS NOTES
Subjective:       Patient ID: Merrill Cummings is a 70 y.o. male.    Chief Complaint: Follow-up    HPI    70-year-old with prostate cancer diagnosed in December 2021.  Group clinical stage IIIA. cT1c cN0 cM0, Harvard score 4+3=7.  PSA = 21.   Patient's father and grandfather had prostate cancer.   He was treated by Dr. Puckett with a full course of radiation therapy and completed in June of 2022.  He is overall doing well.  He has noted more difficulty emptying his bladder over the last year.  At 1 time he was taking Flomax but discontinued last year.   He received his 1st Lupron injection in February of 2022.  His most recent PSA is 0.18.  Urine dipstick shows negative for all components except trace blood.       PSA Diagnostic   Latest Ref Rng 0.00 - 4.00 ng/mL   11/8/2021 21.0 (H)    7/8/2022 0.02    11/28/2022 <0.01    6/5/2023 <0.01    1/19/2024 <0.01    9/3/2024 0.13   12/5/2024 0.22   3/25/2025 0.18     Review of Systems   Constitutional:  Negative for fever.   Genitourinary:  Negative for dysuria and hematuria.       Objective:      Physical Exam  Vitals reviewed.   Constitutional:       Appearance: He is well-developed.   Pulmonary:      Effort: Pulmonary effort is normal.   Neurological:      Mental Status: He is alert and oriented to person, place, and time.         Assessment:       1. Prostate cancer        Plan:       Prostate cancer  -     POCT Urinalysis(Instrument)      PSA is overall stable.  I recommend he resume Flomax.  Follow up 6 months.  Repeat PSA in 3 months.    Visit today included increased complexity associated with the care of prostate cancer and managing the longitudinal care of the patient due to the serious and/or complex managed problem(s) of prostate cancer.

## 2025-04-23 ENCOUNTER — PATIENT MESSAGE (OUTPATIENT)
Dept: FAMILY MEDICINE | Facility: CLINIC | Age: 70
End: 2025-04-23
Payer: MEDICARE

## 2025-04-28 RX ORDER — ROSUVASTATIN CALCIUM 20 MG/1
20 TABLET, COATED ORAL DAILY
Qty: 90 TABLET | Refills: 2 | Status: SHIPPED | OUTPATIENT
Start: 2025-04-28

## 2025-04-28 NOTE — TELEPHONE ENCOUNTER
Care Due:                  Date            Visit Type   Department     Provider  --------------------------------------------------------------------------------                                EP -                              PRIMARY      [facility] FAMILY  Last Visit: 02-      CARE ([facility])   MEDICINE       Go Raza  Next Visit: None Scheduled  None         None Found                                                            Last  Test          Frequency    Reason                     Performed    Due Date  --------------------------------------------------------------------------------    HBA1C.......  6 months...  metFORMIN................  01- 07-    [facility] Embedded Care Due Messages. Reference number: [id number].   4/28/2025 10:49:06 AM CDT

## 2025-05-13 LAB
LEFT EYE DM RETINOPATHY: NEGATIVE
RIGHT EYE DM RETINOPATHY: NEGATIVE

## 2025-06-03 ENCOUNTER — PATIENT OUTREACH (OUTPATIENT)
Dept: ADMINISTRATIVE | Facility: HOSPITAL | Age: 70
End: 2025-06-03
Payer: MEDICARE

## 2025-07-01 LAB
ALBUMIN: 4.4 (ref 3.8–4.8)
ALP SERPL-CCNC: 68 U/L (ref 44–121)
ALT SERPL W P-5'-P-CCNC: 25 U/L (ref 0–44)
AST SERPL-CCNC: 29 U/L (ref 0–40)
BILIRUB SERPL-MCNC: 0.6 MG/DL (ref 0–1.2)
BUN SERPL-MCNC: 15 MG/DL (ref 8–27)
BUN/CREAT RATIO: 23 (ref 10–24)
CALCIUM SERPL-MCNC: 9.6 MG/DL (ref 8.6–10.2)
CHLORIDE SERPL-SCNC: 101 MMOL/L (ref 96–106)
CHOLEST SERPL-MCNC: 161 MG/DL (ref 100–199)
CHOLEST SERPL-MCNC: 73 MG/DL (ref 0–149)
CO2 SERPL-SCNC: 26 MMOL/L (ref 20–29)
CREAT SERPL-MCNC: 0.7 MG/DL (ref 0.8–1.3)
EGFR: 100
GLOBULIN: 2.4 (ref 1.5–4.5)
GLUCOSE SERPL-MCNC: 169 MG/DL (ref 70–99)
HBA1C MFR BLD: 7.3 % (ref 4.8–5.6)
HDLC SERPL-MCNC: 42 MG/DL
LDLC SERPL CALC-MCNC: 105 MG/DL (ref 0–99)
POTASSIUM SERPL-SCNC: 4.4 MMOL/L (ref 3.5–5.2)
PROT SNV-MCNC: 6.8 G/L (ref 6–8.5)
SODIUM BLD-SCNC: 142 MMOL/L (ref 134–144)
VLDLC SERPL-MCNC: 14 MG/DL (ref 5–40)

## 2025-07-10 ENCOUNTER — TELEPHONE (OUTPATIENT)
Dept: FAMILY MEDICINE | Facility: CLINIC | Age: 70
End: 2025-07-10
Payer: MEDICARE

## 2025-07-10 DIAGNOSIS — D69.6 THROMBOCYTOPENIA: Primary | ICD-10-CM

## 2025-07-10 LAB — HBA1C MFR BLD: 7.3 %

## 2025-07-11 ENCOUNTER — LAB VISIT (OUTPATIENT)
Dept: LAB | Facility: HOSPITAL | Age: 70
End: 2025-07-11
Attending: FAMILY MEDICINE
Payer: MEDICARE

## 2025-07-11 DIAGNOSIS — C61 PROSTATE CANCER: ICD-10-CM

## 2025-07-11 LAB — PSA SERPL-MCNC: 0.2 NG/ML

## 2025-07-11 PROCEDURE — 84153 ASSAY OF PSA TOTAL: CPT

## 2025-07-11 PROCEDURE — 36415 COLL VENOUS BLD VENIPUNCTURE: CPT | Mod: PO

## 2025-07-11 NOTE — TELEPHONE ENCOUNTER
Patient had lab work with his cardiologist recently.  Platelets were low.  Hemoglobin A1c was slightly increasing.  Please get CBC and platelet with blue top.  Order placed.  Have him follow-up appointment after this.  We can cancel the hemoglobin A1c that was scheduled for 7/11

## 2025-07-11 NOTE — TELEPHONE ENCOUNTER
Sent a message to Keyonna in the lab asking if this can be added to lab drawn today (PSA ) Will wait for her response .

## 2025-07-14 ENCOUNTER — PATIENT MESSAGE (OUTPATIENT)
Dept: FAMILY MEDICINE | Facility: CLINIC | Age: 70
End: 2025-07-14
Payer: MEDICARE

## 2025-07-14 NOTE — TELEPHONE ENCOUNTER
The blood test he had done here recently was PSA test only for the urologist.  We were trying to get CBC and platelet with blue top, however I do not think they were able to get the orders in the lab prior to him having had the blood drawn.  Orders still appear to be active and He is still needs to get this done now.  Have him follow-up appointment with me a day or 2 after blood work.  We currently do not have anything scheduled for him in 6 months but will probably need to schedule additional lab work in January

## 2025-07-15 ENCOUNTER — LAB VISIT (OUTPATIENT)
Dept: LAB | Facility: HOSPITAL | Age: 70
End: 2025-07-15
Attending: FAMILY MEDICINE
Payer: MEDICARE

## 2025-07-15 DIAGNOSIS — D69.6 THROMBOCYTOPENIA: ICD-10-CM

## 2025-07-15 LAB
ABSOLUTE EOSINOPHIL (OHS): 0.18 K/UL
ABSOLUTE MONOCYTE (OHS): 0.77 K/UL (ref 0.3–1)
ABSOLUTE NEUTROPHIL COUNT (OHS): 4.41 K/UL (ref 1.8–7.7)
BASOPHILS # BLD AUTO: 0.06 K/UL
BASOPHILS NFR BLD AUTO: 0.9 %
ERYTHROCYTE [DISTWIDTH] IN BLOOD BY AUTOMATED COUNT: 13.8 % (ref 11.5–14.5)
HCT VFR BLD AUTO: 46.7 % (ref 40–54)
HGB BLD-MCNC: 15.1 GM/DL (ref 14–18)
IMM GRANULOCYTES # BLD AUTO: 0.01 K/UL (ref 0–0.04)
IMM GRANULOCYTES NFR BLD AUTO: 0.1 % (ref 0–0.5)
LYMPHOCYTES # BLD AUTO: 1.43 K/UL (ref 1–4.8)
MCH RBC QN AUTO: 28.8 PG (ref 27–31)
MCHC RBC AUTO-ENTMCNC: 32.3 G/DL (ref 32–36)
MCV RBC AUTO: 89 FL (ref 82–98)
NUCLEATED RBC (/100WBC) (OHS): 0 /100 WBC
PLATELET # BLD AUTO: 140 K/UL (ref 150–450)
PLATELET # BLD AUTO: 162 K/UL (ref 150–450)
PMV BLD AUTO: 11.1 FL (ref 9.2–12.9)
PMV BLD AUTO: 14 FL (ref 9.2–12.9)
RBC # BLD AUTO: 5.24 M/UL (ref 4.6–6.2)
RELATIVE EOSINOPHIL (OHS): 2.6 %
RELATIVE LYMPHOCYTE (OHS): 20.8 % (ref 18–48)
RELATIVE MONOCYTE (OHS): 11.2 % (ref 4–15)
RELATIVE NEUTROPHIL (OHS): 64.4 % (ref 38–73)
WBC # BLD AUTO: 6.86 K/UL (ref 3.9–12.7)

## 2025-07-15 PROCEDURE — 85049 AUTOMATED PLATELET COUNT: CPT

## 2025-07-15 PROCEDURE — 36415 COLL VENOUS BLD VENIPUNCTURE: CPT | Mod: PO

## 2025-07-15 PROCEDURE — 85025 COMPLETE CBC W/AUTO DIFF WBC: CPT

## 2025-07-17 ENCOUNTER — PATIENT MESSAGE (OUTPATIENT)
Dept: FAMILY MEDICINE | Facility: CLINIC | Age: 70
End: 2025-07-17
Payer: MEDICARE

## 2025-07-17 ENCOUNTER — TELEPHONE (OUTPATIENT)
Dept: FAMILY MEDICINE | Facility: CLINIC | Age: 70
End: 2025-07-17
Payer: MEDICARE

## 2025-07-17 DIAGNOSIS — D69.6 THROMBOCYTOPENIA: Primary | ICD-10-CM

## 2025-07-17 NOTE — TELEPHONE ENCOUNTER
----- Message from Go Raza MD sent at 7/16/2025 11:04 AM CDT -----  Platelet count on the CBC is normal.  In the blue top tube in his slightly low.  At this level would not require further evaluation at this time.  I would recommend rechecking a CBC in 6 months.  ----- Message -----  From: Lab, Background User  Sent: 7/15/2025   4:37 PM CDT  To: Go Raza MD

## 2025-08-01 ENCOUNTER — PATIENT OUTREACH (OUTPATIENT)
Dept: ADMINISTRATIVE | Facility: HOSPITAL | Age: 70
End: 2025-08-01
Payer: MEDICARE

## 2025-08-29 ENCOUNTER — PATIENT MESSAGE (OUTPATIENT)
Dept: FAMILY MEDICINE | Facility: CLINIC | Age: 70
End: 2025-08-29
Payer: MEDICARE

## (undated) DEVICE — SOL IRR STRL WATER 500ML

## (undated) DEVICE — COVER TRANSDUCER LATEX N/STERI

## (undated) DEVICE — LUBRICANT SURGILUBE 2 OZ

## (undated) DEVICE — CUP MEDICINE STERILE 2OZ

## (undated) DEVICE — SYR LUER LOCK TIP 6CC

## (undated) DEVICE — FORMALIN 60ML PREFILLED CONT

## (undated) DEVICE — GLOVE BIOGEL ECLIPSE SZ 7.5

## (undated) DEVICE — NDL SPINAL 22GX7 SPINOCAN

## (undated) DEVICE — GAUZE SPONGE 4'X4' 8PLY NS

## (undated) DEVICE — MARKER SKIN STND TIP BLUE BARR

## (undated) DEVICE — NEEDLE HYPO 18X1.5 SG

## (undated) DEVICE — SEE MEDLINE ITEM 157128

## (undated) DEVICE — SEE MEDLINE ITEM 152474

## (undated) DEVICE — NDL TISSUE BIOPSY MAGNUM

## (undated) DEVICE — SCRUB 10% POVIDONE IODINE 4OZ

## (undated) DEVICE — SYR 50CC LL

## (undated) DEVICE — SYR 10CC LUER LOCK

## (undated) DEVICE — PAD PREP 50/CA

## (undated) DEVICE — SEE MEDLINE ITEM 146362